# Patient Record
Sex: MALE | Race: WHITE | NOT HISPANIC OR LATINO | Employment: OTHER | URBAN - METROPOLITAN AREA
[De-identification: names, ages, dates, MRNs, and addresses within clinical notes are randomized per-mention and may not be internally consistent; named-entity substitution may affect disease eponyms.]

---

## 2021-09-08 ENCOUNTER — HOSPITAL ENCOUNTER (INPATIENT)
Facility: HOSPITAL | Age: 66
LOS: 11 days | Discharge: HOME | End: 2021-09-19
Attending: EMERGENCY MEDICINE | Admitting: INTERNAL MEDICINE

## 2021-09-08 ENCOUNTER — APPOINTMENT (OUTPATIENT)
Dept: GENERAL RADIOLOGY | Facility: HOSPITAL | Age: 66
End: 2021-09-08

## 2021-09-08 ENCOUNTER — APPOINTMENT (OUTPATIENT)
Dept: CT IMAGING | Facility: HOSPITAL | Age: 66
End: 2021-09-08

## 2021-09-08 DIAGNOSIS — U07.1 PNEUMONIA DUE TO COVID-19 VIRUS: Primary | ICD-10-CM

## 2021-09-08 DIAGNOSIS — R09.02 HYPOXIA: ICD-10-CM

## 2021-09-08 DIAGNOSIS — J12.82 PNEUMONIA DUE TO COVID-19 VIRUS: Primary | ICD-10-CM

## 2021-09-08 PROBLEM — I10 ESSENTIAL HYPERTENSION: Status: ACTIVE | Noted: 2021-09-08

## 2021-09-08 LAB
ALBUMIN SERPL-MCNC: 3.8 G/DL (ref 3.5–5.2)
ALBUMIN/GLOB SERPL: 1.1 G/DL
ALP SERPL-CCNC: 66 U/L (ref 39–117)
ALT SERPL W P-5'-P-CCNC: 25 U/L (ref 1–41)
ANION GAP SERPL CALCULATED.3IONS-SCNC: 15 MMOL/L (ref 5–15)
AST SERPL-CCNC: 30 U/L (ref 1–40)
BASOPHILS # BLD AUTO: 0.02 10*3/MM3 (ref 0–0.2)
BASOPHILS NFR BLD AUTO: 0.3 % (ref 0–1.5)
BILIRUB SERPL-MCNC: 0.6 MG/DL (ref 0–1.2)
BUN SERPL-MCNC: 19 MG/DL (ref 8–23)
BUN/CREAT SERPL: 23.5 (ref 7–25)
CALCIUM SPEC-SCNC: 8.8 MG/DL (ref 8.6–10.5)
CHLORIDE SERPL-SCNC: 97 MMOL/L (ref 98–107)
CO2 SERPL-SCNC: 23 MMOL/L (ref 22–29)
CREAT SERPL-MCNC: 0.81 MG/DL (ref 0.76–1.27)
D DIMER PPP FEU-MCNC: 0.57 MCGFEU/ML (ref 0–0.56)
D-LACTATE SERPL-SCNC: 1.8 MMOL/L (ref 0.5–2)
DEPRECATED RDW RBC AUTO: 39.5 FL (ref 37–54)
EOSINOPHIL # BLD AUTO: 0 10*3/MM3 (ref 0–0.4)
EOSINOPHIL NFR BLD AUTO: 0 % (ref 0.3–6.2)
ERYTHROCYTE [DISTWIDTH] IN BLOOD BY AUTOMATED COUNT: 11.6 % (ref 12.3–15.4)
GFR SERPL CREATININE-BSD FRML MDRD: 95 ML/MIN/1.73
GLOBULIN UR ELPH-MCNC: 3.4 GM/DL
GLUCOSE SERPL-MCNC: 227 MG/DL (ref 65–99)
HCT VFR BLD AUTO: 47.5 % (ref 37.5–51)
HGB BLD-MCNC: 16.5 G/DL (ref 13–17.7)
HOLD SPECIMEN: NORMAL
HOLD SPECIMEN: NORMAL
IMM GRANULOCYTES # BLD AUTO: 0.03 10*3/MM3 (ref 0–0.05)
IMM GRANULOCYTES NFR BLD AUTO: 0.4 % (ref 0–0.5)
LYMPHOCYTES # BLD AUTO: 1.08 10*3/MM3 (ref 0.7–3.1)
LYMPHOCYTES NFR BLD AUTO: 14.7 % (ref 19.6–45.3)
MCH RBC QN AUTO: 32.2 PG (ref 26.6–33)
MCHC RBC AUTO-ENTMCNC: 34.7 G/DL (ref 31.5–35.7)
MCV RBC AUTO: 92.8 FL (ref 79–97)
MONOCYTES # BLD AUTO: 0.49 10*3/MM3 (ref 0.1–0.9)
MONOCYTES NFR BLD AUTO: 6.7 % (ref 5–12)
NEUTROPHILS NFR BLD AUTO: 5.74 10*3/MM3 (ref 1.7–7)
NEUTROPHILS NFR BLD AUTO: 77.9 % (ref 42.7–76)
NRBC BLD AUTO-RTO: 0 /100 WBC (ref 0–0.2)
NT-PROBNP SERPL-MCNC: 245.4 PG/ML (ref 0–900)
PLATELET # BLD AUTO: 139 10*3/MM3 (ref 140–450)
PMV BLD AUTO: 10.1 FL (ref 6–12)
POTASSIUM SERPL-SCNC: 3.6 MMOL/L (ref 3.5–5.2)
PROT SERPL-MCNC: 7.2 G/DL (ref 6–8.5)
RBC # BLD AUTO: 5.12 10*6/MM3 (ref 4.14–5.8)
SODIUM SERPL-SCNC: 135 MMOL/L (ref 136–145)
TROPONIN T SERPL-MCNC: <0.01 NG/ML (ref 0–0.03)
WBC # BLD AUTO: 7.36 10*3/MM3 (ref 3.4–10.8)
WHOLE BLOOD HOLD SPECIMEN: NORMAL
WHOLE BLOOD HOLD SPECIMEN: NORMAL

## 2021-09-08 PROCEDURE — 0 IOPAMIDOL PER 1 ML: Performed by: INTERNAL MEDICINE

## 2021-09-08 PROCEDURE — 71275 CT ANGIOGRAPHY CHEST: CPT

## 2021-09-08 PROCEDURE — U0003 INFECTIOUS AGENT DETECTION BY NUCLEIC ACID (DNA OR RNA); SEVERE ACUTE RESPIRATORY SYNDROME CORONAVIRUS 2 (SARS-COV-2) (CORONAVIRUS DISEASE [COVID-19]), AMPLIFIED PROBE TECHNIQUE, MAKING USE OF HIGH THROUGHPUT TECHNOLOGIES AS DESCRIBED BY CMS-2020-01-R: HCPCS | Performed by: EMERGENCY MEDICINE

## 2021-09-08 PROCEDURE — 71045 X-RAY EXAM CHEST 1 VIEW: CPT

## 2021-09-08 PROCEDURE — 87040 BLOOD CULTURE FOR BACTERIA: CPT | Performed by: INTERNAL MEDICINE

## 2021-09-08 PROCEDURE — 25010000002 DEXAMETHASONE PER 1 MG: Performed by: EMERGENCY MEDICINE

## 2021-09-08 PROCEDURE — 83605 ASSAY OF LACTIC ACID: CPT | Performed by: INTERNAL MEDICINE

## 2021-09-08 PROCEDURE — XW033E5 INTRODUCTION OF REMDESIVIR ANTI-INFECTIVE INTO PERIPHERAL VEIN, PERCUTANEOUS APPROACH, NEW TECHNOLOGY GROUP 5: ICD-10-PCS | Performed by: INTERNAL MEDICINE

## 2021-09-08 PROCEDURE — 84145 PROCALCITONIN (PCT): CPT | Performed by: INTERNAL MEDICINE

## 2021-09-08 PROCEDURE — 80053 COMPREHEN METABOLIC PANEL: CPT

## 2021-09-08 PROCEDURE — 84484 ASSAY OF TROPONIN QUANT: CPT | Performed by: EMERGENCY MEDICINE

## 2021-09-08 PROCEDURE — 99222 1ST HOSP IP/OBS MODERATE 55: CPT | Performed by: INTERNAL MEDICINE

## 2021-09-08 PROCEDURE — 99283 EMERGENCY DEPT VISIT LOW MDM: CPT

## 2021-09-08 PROCEDURE — 83880 ASSAY OF NATRIURETIC PEPTIDE: CPT | Performed by: EMERGENCY MEDICINE

## 2021-09-08 PROCEDURE — 93005 ELECTROCARDIOGRAM TRACING: CPT | Performed by: EMERGENCY MEDICINE

## 2021-09-08 PROCEDURE — 25010000002 DEXAMETHASONE SODIUM PHOSPHATE 100 MG/10ML SOLUTION: Performed by: EMERGENCY MEDICINE

## 2021-09-08 PROCEDURE — 85379 FIBRIN DEGRADATION QUANT: CPT | Performed by: INTERNAL MEDICINE

## 2021-09-08 PROCEDURE — 85025 COMPLETE CBC W/AUTO DIFF WBC: CPT

## 2021-09-08 RX ORDER — LISINOPRIL 40 MG/1
40 TABLET ORAL DAILY
COMMUNITY

## 2021-09-08 RX ORDER — DEXAMETHASONE SODIUM PHOSPHATE 4 MG/ML
6 INJECTION, SOLUTION INTRA-ARTICULAR; INTRALESIONAL; INTRAMUSCULAR; INTRAVENOUS; SOFT TISSUE DAILY
Status: DISCONTINUED | OUTPATIENT
Start: 2021-09-09 | End: 2021-09-09

## 2021-09-08 RX ADMIN — DEXAMETHASONE SODIUM PHOSPHATE 10 MG: 10 INJECTION, SOLUTION INTRAMUSCULAR; INTRAVENOUS at 22:58

## 2021-09-08 RX ADMIN — IOPAMIDOL 85 ML: 755 INJECTION, SOLUTION INTRAVENOUS at 23:56

## 2021-09-09 PROBLEM — R09.02 HYPOXIA: Status: ACTIVE | Noted: 2021-09-09

## 2021-09-09 LAB
ALBUMIN SERPL-MCNC: 3.5 G/DL (ref 3.5–5.2)
ALP SERPL-CCNC: 61 U/L (ref 39–117)
ALT SERPL W P-5'-P-CCNC: 23 U/L (ref 1–41)
AST SERPL-CCNC: 25 U/L (ref 1–40)
BILIRUB CONJ SERPL-MCNC: 0.2 MG/DL (ref 0–0.3)
BILIRUB INDIRECT SERPL-MCNC: 0.4 MG/DL
BILIRUB SERPL-MCNC: 0.6 MG/DL (ref 0–1.2)
CREAT SERPL-MCNC: 0.75 MG/DL (ref 0.76–1.27)
CRP SERPL-MCNC: 4.34 MG/DL (ref 0–0.5)
FERRITIN SERPL-MCNC: 1738 NG/ML (ref 30–400)
GFR SERPL CREATININE-BSD FRML MDRD: 104 ML/MIN/1.73
GLUCOSE BLDC GLUCOMTR-MCNC: 252 MG/DL (ref 70–130)
GLUCOSE BLDC GLUCOMTR-MCNC: 266 MG/DL (ref 70–130)
GLUCOSE BLDC GLUCOMTR-MCNC: 289 MG/DL (ref 70–130)
GLUCOSE BLDC GLUCOMTR-MCNC: 337 MG/DL (ref 70–130)
HOLD SPECIMEN: NORMAL
LDH SERPL-CCNC: 277 U/L (ref 135–225)
PROCALCITONIN SERPL-MCNC: 0.1 NG/ML (ref 0–0.25)
PROT SERPL-MCNC: 6.9 G/DL (ref 6–8.5)
QT INTERVAL: 378 MS
QTC INTERVAL: 430 MS
SARS-COV-2 RNA RESP QL NAA+PROBE: DETECTED

## 2021-09-09 PROCEDURE — 82728 ASSAY OF FERRITIN: CPT | Performed by: NURSE PRACTITIONER

## 2021-09-09 PROCEDURE — 82962 GLUCOSE BLOOD TEST: CPT

## 2021-09-09 PROCEDURE — 94799 UNLISTED PULMONARY SVC/PX: CPT

## 2021-09-09 PROCEDURE — 63710000001 INSULIN DETEMIR PER 5 UNITS: Performed by: INTERNAL MEDICINE

## 2021-09-09 PROCEDURE — 25010000002 ENOXAPARIN PER 10 MG: Performed by: NURSE PRACTITIONER

## 2021-09-09 PROCEDURE — 80076 HEPATIC FUNCTION PANEL: CPT | Performed by: NURSE PRACTITIONER

## 2021-09-09 PROCEDURE — 82565 ASSAY OF CREATININE: CPT | Performed by: NURSE PRACTITIONER

## 2021-09-09 PROCEDURE — 86140 C-REACTIVE PROTEIN: CPT | Performed by: NURSE PRACTITIONER

## 2021-09-09 PROCEDURE — 63710000001 DEXAMETHASONE PER 0.25 MG: Performed by: INTERNAL MEDICINE

## 2021-09-09 PROCEDURE — 99233 SBSQ HOSP IP/OBS HIGH 50: CPT | Performed by: INTERNAL MEDICINE

## 2021-09-09 PROCEDURE — 63710000001 INSULIN LISPRO (HUMAN) PER 5 UNITS: Performed by: INTERNAL MEDICINE

## 2021-09-09 PROCEDURE — 83615 LACTATE (LD) (LDH) ENZYME: CPT | Performed by: NURSE PRACTITIONER

## 2021-09-09 RX ORDER — DEXTROSE MONOHYDRATE 25 G/50ML
25 INJECTION, SOLUTION INTRAVENOUS
Status: DISCONTINUED | OUTPATIENT
Start: 2021-09-09 | End: 2021-09-10 | Stop reason: SDUPTHER

## 2021-09-09 RX ORDER — NICOTINE POLACRILEX 4 MG
15 LOZENGE BUCCAL
Status: DISCONTINUED | OUTPATIENT
Start: 2021-09-09 | End: 2021-09-10 | Stop reason: SDUPTHER

## 2021-09-09 RX ORDER — DEXTROMETHORPHAN POLISTIREX 30 MG/5ML
60 SUSPENSION ORAL EVERY 12 HOURS SCHEDULED
Status: DISCONTINUED | OUTPATIENT
Start: 2021-09-09 | End: 2021-09-19 | Stop reason: HOSPADM

## 2021-09-09 RX ORDER — BENZONATATE 100 MG/1
200 CAPSULE ORAL 3 TIMES DAILY PRN
Status: DISCONTINUED | OUTPATIENT
Start: 2021-09-09 | End: 2021-09-19 | Stop reason: HOSPADM

## 2021-09-09 RX ORDER — LISINOPRIL 40 MG/1
40 TABLET ORAL DAILY
Status: DISCONTINUED | OUTPATIENT
Start: 2021-09-09 | End: 2021-09-11

## 2021-09-09 RX ORDER — ALBUTEROL SULFATE 90 UG/1
2 AEROSOL, METERED RESPIRATORY (INHALATION)
Status: DISCONTINUED | OUTPATIENT
Start: 2021-09-09 | End: 2021-09-19

## 2021-09-09 RX ORDER — ACETAMINOPHEN 325 MG/1
650 TABLET ORAL EVERY 6 HOURS PRN
Status: DISCONTINUED | OUTPATIENT
Start: 2021-09-09 | End: 2021-09-19 | Stop reason: HOSPADM

## 2021-09-09 RX ADMIN — BENZONATATE 200 MG: 100 CAPSULE ORAL at 03:32

## 2021-09-09 RX ADMIN — INSULIN LISPRO 5 UNITS: 100 INJECTION, SOLUTION INTRAVENOUS; SUBCUTANEOUS at 17:09

## 2021-09-09 RX ADMIN — ALBUTEROL SULFATE 2 PUFF: 90 AEROSOL, METERED RESPIRATORY (INHALATION) at 11:27

## 2021-09-09 RX ADMIN — INSULIN LISPRO 4 UNITS: 100 INJECTION, SOLUTION INTRAVENOUS; SUBCUTANEOUS at 09:02

## 2021-09-09 RX ADMIN — DEXAMETHASONE 6 MG: 4 TABLET ORAL at 09:02

## 2021-09-09 RX ADMIN — ALBUTEROL SULFATE 2 PUFF: 90 AEROSOL, METERED RESPIRATORY (INHALATION) at 06:55

## 2021-09-09 RX ADMIN — INSULIN DETEMIR 10 UNITS: 100 INJECTION, SOLUTION SUBCUTANEOUS at 12:12

## 2021-09-09 RX ADMIN — INSULIN LISPRO 4 UNITS: 100 INJECTION, SOLUTION INTRAVENOUS; SUBCUTANEOUS at 12:12

## 2021-09-09 RX ADMIN — ALBUTEROL SULFATE 2 PUFF: 90 AEROSOL, METERED RESPIRATORY (INHALATION) at 15:06

## 2021-09-09 RX ADMIN — ALBUTEROL SULFATE 2 PUFF: 90 AEROSOL, METERED RESPIRATORY (INHALATION) at 19:30

## 2021-09-09 RX ADMIN — REMDESIVIR 200 MG: 100 INJECTION, POWDER, LYOPHILIZED, FOR SOLUTION INTRAVENOUS at 01:57

## 2021-09-09 RX ADMIN — ENOXAPARIN SODIUM 40 MG: 40 INJECTION SUBCUTANEOUS at 20:52

## 2021-09-09 RX ADMIN — LISINOPRIL 40 MG: 40 TABLET ORAL at 09:02

## 2021-09-09 RX ADMIN — Medication 60 MG: at 20:49

## 2021-09-09 RX ADMIN — Medication 60 MG: at 09:02

## 2021-09-09 NOTE — H&P
[facility]  HISTORY AND PHYSICAL    Patient Name: Flip Yu II  : 1955  MRN: [id number]  Primary Care Physician: Nico Theodore MD  Date of admission: 2021    Subjective   Subjective     Chief Complaint:  Cough, post nasal drip, congestion     HPI:  Flip Yu II is a 66 y.o. male with a past medical history significant for essential hypertension presents to the ED with complaints of cough, post nasal drip and congestion over the past 3 weeks.  Patient denies any fever, chills, nausea, vomiting, diarrhea, abdominal pain, chest pain or palpitations.  Upon arrival to the ED her oxygen saturation was 89%.  CXR obtained showed subtle faint bilateral infiltrates suggesting COVID 19 pneumonia.  Patient reports he not vaccinated.  He denies any known ill contacts.  Currently COVID-19 PCR is pending. Patient will be admitted to [facility] under the care of the Hospitalist for further evaluation and treatment.        COVID Details:    Symptoms:    [] NONE [] Fever [x]  Cough [] Shortness of breath [] Change in taste/smell      The patient qualifies to receive the vaccine, but they have not yet received it.      Review of Systems   Constitutional: Positive for appetite change. Negative for activity change, chills, diaphoresis, fatigue, fever and unexpected weight change.   HENT: Positive for congestion and postnasal drip.    Eyes: Negative for photophobia and visual disturbance.   Respiratory: Positive for cough. Negative for shortness of breath.    Cardiovascular: Negative for chest pain, palpitations and leg swelling.   Gastrointestinal: Negative for abdominal distention, abdominal pain, blood in stool, constipation, diarrhea, nausea and vomiting.   Genitourinary: Negative.    Musculoskeletal: Negative.    Skin: Negative.    Neurological: Negative.    Psychiatric/Behavioral: Negative.         All other systems reviewed and are negative.     Personal History      Past Medical History:   Diagnosis Date   • Diabetes (CMS/HCC)    • Hypertension        History reviewed. No pertinent surgical history.    Family History: family history includes No Known Problems in his mother. Otherwise pertinent FHx was reviewed and unremarkable.     Social History:  reports that he has never smoked. He has never used smokeless tobacco. He reports that he does not drink alcohol and does not use drugs.  Social History     Social History Narrative   • Not on file       Medications:  lisinopril    No Known Allergies    Objective   Objective     Vital Signs:   Temp:  [98.7 °F (37.1 °C)-99.9 °F (37.7 °C)] 98.7 °F (37.1 °C)  Heart Rate:  [77-91] 77  Resp:  [16-18] 16  BP: (145-151)/(85-92) 151/85  Flow (L/min):  [3-4] 3    Physical Exam   With patient's consent, physical exam was conducted via visual telemedicine encounter due to patient's current isolation requirements in the interest of PPE conservation.    Constitutional: No acute distress, awake, alert, nontoxic, normal body habitus  HENT: NCAT, MMM, no conjunctival injection  Respiratory: Good effort, nonlabored respirations   Cardiovascular:  tele with NSR  Musculoskeletal: No edema, normal muscle tone and mass for age  Psychiatric: Appropriate affect, good insight and judgement, cooperative  Neurologic: Oriented x 3, movements symmetric BUE and BLE, speech clear and fluent  Skin: No visible rashes, no jaundice seen on exposed skin through window        Results Reviewed:  I have personally reviewed most recent indicated data and agree with findings including:  [x]  Laboratory  [x]  Radiology  [x]  EKG/Telemetry  []  Pathology  []  Cardiac/Vascular Studies  []  Old records  []  Other:  Most pertinent findings include:      LAB RESULTS:      Lab 09/08/21 2005   WBC 7.36   HEMOGLOBIN 16.5   HEMATOCRIT 47.5   PLATELETS 139*   NEUTROS ABS 5.74   IMMATURE GRANS (ABS) 0.03   LYMPHS ABS 1.08   MONOS ABS 0.49   EOS ABS 0.00   MCV 92.8   PROCALCITONIN  0.10   LACTATE 1.8   D DIMER QUANT 0.57*         Lab 09/08/21 2005   SODIUM 135*   POTASSIUM 3.6   CHLORIDE 97*   CO2 23.0   ANION GAP 15.0   BUN 19   CREATININE 0.81   GLUCOSE 227*   CALCIUM 8.8         Lab 09/08/21 2005   TOTAL PROTEIN 7.2   ALBUMIN 3.80   GLOBULIN 3.4   ALT (SGPT) 25   AST (SGOT) 30   BILIRUBIN 0.6   ALK PHOS 66         Lab 09/08/21 2005   PROBNP 245.4   TROPONIN T <0.010                 Brief Urine Lab Results     None        Microbiology Results (last 10 days)     Procedure Component Value - Date/Time    COVID PRE-OP / PRE-PROCEDURE SCREENING ORDER (NO ISOLATION) - Swab, Nasopharynx [[id number]]  (Abnormal) Collected: 09/08/21 2305    Lab Status: Final result Specimen: Swab from Nasopharynx Updated: 09/09/21 0016    Narrative:      The following orders were created for panel order COVID PRE-OP / PRE-PROCEDURE SCREENING ORDER (NO ISOLATION) - Swab, Nasopharynx.  Procedure                               Abnormality         Status                     ---------                               -----------         ------                     COVID-19,CEPHEID/ADRIENNE,LE...[[id number]]  Abnormal            Final result                 Please view results for these tests on the individual orders.    COVID-19,CEPHEID/ADRIENNE,[facility] IN-HOUSE(OR EMERGENT/ADD-ON),NP SWAB IN TRANSPORT MEDIA 3-4 HR TAT - Swab, Nasopharynx [[id number]]  (Abnormal) Collected: 09/08/21 2305    Lab Status: Final result Specimen: Swab from Nasopharynx Updated: 09/09/21 0016     COVID19 Detected    Narrative:      Fact sheet for providers: https://www.fda.gov/media/889110/download     Fact sheet for patients: https://www.fda.gov/media/137487/download  Fact sheet for providers: https://www.fda.gov/media/448767/download     Fact sheet for patients: https://www.fda.gov/media/174604/download          XR Chest 1 View    Result Date: 9/8/2021  CR Chest 1 Vw INDICATION: Congestion tonight COMPARISON:  None available. FINDINGS: Portable AP view(s) of  the chest.  Size and vascular normal. There are faint patchy infiltrates bilaterally greater on the right side than the left. The bones are normal     Impression: Subtle faint bilateral infiltrates suggesting Covid 19 pneumonia. Clinical correlation is recommended Signer Name: Ze Centeno MD  Signed: 9/8/2021 9:12 PM  Workstation Name: [facility]  [facility]    CT Angiogram Chest    Result Date: 9/9/2021  CTA Chest INDICATION: Elevated d-dimer. Covid pneumonia. TECHNIQUE: CT angiogram of the chest with IV contrast. 3-D reconstructions were obtained and reviewed.   Radiation dose reduction techniques included automated exposure control or exposure modulation based on body size. Count of known CT and cardiac nuc med studies performed in previous 12 months: 0. COMPARISON: None available. FINDINGS: Multifocal parenchymal opacities throughout both lungs most prominent within the dependent and lower lung zones. Findings compatible with multifocal pneumonitis and atelectasis. No effusions. Trachea and bronchi unremarkable. No evidence of pulmonary embolus. Heart size within normal limits. Aorta free of dissection or aneurysm. Solitary gallstone without evidence of acute cholecystitis.     Impression: Multifocal parenchymal opacities most compatible with multifocal pneumonia with some superimposed atelectasis. Commonly reported imaging features of COVID-19 pneumonia are present. Other processes such as influenza pneumonia and organizing pneumonia can cause a similar imaging pattern. No evidence of pulmonary embolus. Signer Name: OMAR Alcantara MD  Signed: 9/9/2021 12:05 AM  Workstation Name: [facility]  [facility]          Assessment/Plan   Assessment & Plan       Pneumonia due to COVID-19 virus    Essential hypertension    Hypoxia    Diabetes mellitus, type II (CMS/HCC)    Thrombocytopenia (CMS/HCC)      66 year old male presents to the ED with three week history of  cough, post nasal drainage, congestion and decreased appetite.      Hypoxia due to Covid 19 Pneumonia  --Not on home oxygen, satting 89% at arrival.  Satting 90% on 3 L  --Start dexamethasone 6mg q24h  --Start remdesevir  --tylenol for fever  --d-dimer, ferritin, ldh, crp  --serial covid 19 labs  --dvt ppx  --Blood culture, sputum culture, urine antigens  --If procal is positive, will start empiric antibiotics  --Albuterol inhaler  --Cough suppressant, Zofran    2) Essential hypertension   -Order to obtain home med list    3) diabetes mellitus type 2  -SSI with Accu-Cheks  -Not on home insulin-A1c for a.m.    4) thrombocytopenia  -Likely due to Covid  -A.m. labs    Home med rec not complete, order placed to obtain home med list.      DVT prophylaxis: scds, lovenox    CODE STATUS:  Full code   Level Of Support Discussed With: Patient  Code Status: CPR  Medical Interventions (Level of Support Prior to Arrest): Full      This note has been completed as part of a split-shared workflow.     Signature: Electronically signed by WILFREDO Perez, 09/08/21, 11:48 PM EDT.      Attending   Admission Attestation       I have seen and examined the patient, performing an independent face-to-face diagnostic evaluation with plan of care reviewed and developed with the advanced practice clinician (APC).      Brief Summary Statement:   Flip Yu II is a 66 y.o. male With a PMH significant for HTN, diabetes mellitus who presents to the ED due to persistent cough.  Patient reports a cough productive of yellow sputum and nasal congestion ongoing for the past 3 weeks.  He came to the ED due to failure to improve.  He denies fever, malaise, nausea, vomiting, diarrhea, shortness of breath, headache.  He reports new onset change in taste, denies known exposure to COVID-19.  Patient is not vaccinated.  Remainder of detailed HPI is as noted by APC and has been reviewed and/or edited by me for completeness.    Attending Physical  Exam:  Constitutional: Awake, alert  Eyes: PERRLA, sclerae anicteric, no conjunctival injection  HENT: NCAT, mucous membranes moist  Neck: Supple, no thyromegaly, no lymphadenopathy, trachea midline  Respiratory: Clear to auscultation bilaterally, nonlabored respirations   Cardiovascular: RRR, no murmurs, rubs, or gallops, palpable pedal pulses bilaterally  Gastrointestinal: Positive bowel sounds, soft, nontender, nondistended  Musculoskeletal: No bilateral ankle edema, no clubbing or cyanosis to extremities  Psychiatric: Appropriate affect, cooperative  Neurologic: Oriented x 3, strength symmetric in all extremities, Cranial Nerves grossly intact to confrontation, speech clear  Skin: No rashes      Brief Assessment/Plan :  See detailed assessment and plan developed with APC which I have reviewed and/or edited for completeness.        Admission Status: I believe that this patient meets INPATIENT status due to COVID-19 pneumonia and hypoxia.  I feel patient’s risk for adverse outcomes and need for care warrant INPATIENT evaluation and I predict the patient’s care encounter to likely last beyond 2 midnights.      Sindy Ceja,   09/09/21

## 2021-09-09 NOTE — PROGRESS NOTES
[facility]  PROGRESS NOTE    Patient Name: Flip Yu II  : 1955  MRN: [id number]    Date of Admission: 2021  Primary Care Physician: Nico Theodore MD    Subjective   Subjective     CC:  COVID-19 pneumonia    HPI:  Patient denies any shortness of breath.  He does have a cough.  Denies any diarrhea.  Currently on 6 L nasal cannula.    ROS:  General: denies fevers or chills  CV: denies chest pain  Resp: As per HPI  Abd: denies abd pain, nausea      Objective   Objective     Vital Signs:   Temp:  [98.1 °F (36.7 °C)-99.9 °F (37.7 °C)] 98.1 °F (36.7 °C)  Heart Rate:  [71-91] 71  Resp:  [16-21] 18  BP: (138-151)/(84-92) 138/84  Flow (L/min):  [3-6] 6     Physical Exam:  With patient's consent, physical exam was conducted via visual telemedicine encounter due to patient's current isolation requirements in the interest of PPE conservation.    Constitutional: No acute distress, awake, alert, nontoxic, normal body habitus  Respiratory: Good effort, nonlabored respirations on 6 L nasal cannula  Cardiovascular:  tele with NSR  Musculoskeletal: No edema, normal muscle tone and mass for age  Psychiatric: Appropriate affect, good insight and judgement, cooperative  Neurologic: Oriented x 3, movements symmetric BUE and BLE, speech clear and fluent  Skin: No visible rashes, no jaundice        Results Reviewed:  LAB RESULTS:      Lab 21   WBC  --  7.36   HEMOGLOBIN  --  16.5   HEMATOCRIT  --  47.5   PLATELETS  --  139*   NEUTROS ABS  --  5.74   IMMATURE GRANS (ABS)  --  0.03   LYMPHS ABS  --  1.08   MONOS ABS  --  0.49   EOS ABS  --  0.00   MCV  --  92.8   CRP 4.34*  --    PROCALCITONIN  --  0.10   LACTATE  --  1.8   *  --    D DIMER QUANT  --  0.57*         Lab 21   SODIUM  --  135*   POTASSIUM  --  3.6   CHLORIDE  --  97*   CO2  --  23.0   ANION GAP  --  15.0   BUN  --  19   CREATININE 0.75* 0.81    GLUCOSE  --  227*   CALCIUM  --  8.8         Lab 09/09/21  0138 09/08/21 2005   TOTAL PROTEIN 6.9 7.2   ALBUMIN 3.50 3.80   GLOBULIN  --  3.4   ALT (SGPT) 23 25   AST (SGOT) 25 30   BILIRUBIN 0.6 0.6   INDIRECT BILIRUBIN 0.4  --    BILIRUBIN DIRECT 0.2  --    ALK PHOS 61 66         Lab 09/08/21 2005   PROBNP 245.4   TROPONIN T <0.010             Lab 09/09/21 0138   FERRITIN 1,738.00*         Brief Urine Lab Results     None          Microbiology Results Abnormal     None          XR Chest 1 View    Result Date: 9/8/2021  CR Chest 1 Vw INDICATION: Congestion tonight COMPARISON:  None available. FINDINGS: Portable AP view(s) of the chest.  Size and vascular normal. There are faint patchy infiltrates bilaterally greater on the right side than the left. The bones are normal     Impression: Subtle faint bilateral infiltrates suggesting Covid 19 pneumonia. Clinical correlation is recommended Signer Name: Ze Centeno MD  Signed: 9/8/2021 9:12 PM  Workstation [facility]: [facility]  [facility]    CT Angiogram Chest    Result Date: 9/9/2021  CTA Chest INDICATION: Elevated d-dimer. Covid pneumonia. TECHNIQUE: CT angiogram of the chest with IV contrast. 3-D reconstructions were obtained and reviewed.   Radiation dose reduction techniques included automated exposure control or exposure modulation based on body size. Count of known CT and cardiac nuc med studies performed in previous 12 months: 0. COMPARISON: None available. FINDINGS: Multifocal parenchymal opacities throughout both lungs most prominent within the dependent and lower lung zones. Findings compatible with multifocal pneumonitis and atelectasis. No effusions. Trachea and bronchi unremarkable. No evidence of pulmonary embolus. Heart size within normal limits. Aorta free of dissection or aneurysm. Solitary gallstone without evidence of acute cholecystitis.     Impression: Multifocal parenchymal opacities most compatible with multifocal  pneumonia with some superimposed atelectasis. Commonly reported imaging features of COVID-19 pneumonia are present. Other processes such as influenza pneumonia and organizing pneumonia can cause a similar imaging pattern. No evidence of pulmonary embolus. Signer Name: OMAR Alcantara MD  Signed: 9/9/2021 12:05 AM  Workstation [facility]: [facility]  [facility] [place]          I have reviewed the medications:  Scheduled Meds:albuterol sulfate HFA, 2 puff, Inhalation, 4x Daily - RT  dexamethasone, 6 mg, Oral, Daily With Breakfast  dextromethorphan polistirex ER, 60 mg, Oral, Q12H  enoxaparin, 40 mg, Subcutaneous, Q24H  insulin detemir, 10 Units, Subcutaneous, Daily  insulin lispro, 0-7 Units, Subcutaneous, TID AC  lisinopril, 40 mg, Oral, Daily  [START ON 9/10/2021] remdesivir, 100 mg, Intravenous, Q24H      Continuous Infusions:Pharmacy Consult - Remdesivir,       PRN Meds:.•  acetaminophen  •  benzonatate  •  dextrose  •  dextrose  •  glucagon (human recombinant)  •  Pharmacy Consult - Remdesivir    Assessment/Plan   Assessment & Plan     Active Hospital Problems    Diagnosis  POA   • **Pneumonia due to COVID-19 virus [U07.1, J12.82]  Yes   • Hypoxia [R09.02]  Unknown   • Diabetes mellitus, type II (CMS/HCC) [E11.9]  Unknown   • Thrombocytopenia (CMS/HCC) [D69.6]  Unknown   • Essential hypertension [I10]  Yes      Resolved Hospital Problems   No resolved problems to display.        Brief Hospital Course to date:  66 year old male presents to the ED with three week history of cough, post nasal drainage, congestion and decreased appetite.       Acute hypoxic respiratory failure due to Covid 19 Pneumonia  --Requiring 3 L on presentation, worsened overnight to 6 L, no home oxygen requirements  - CRP elevated at 4.34.  D-dimer mildly elevated 0.57.  Procalcitonin normal  -CTA chest shows multifocal pneumonia consistent with COVID-19  --Continue dexamethasone 6mg daily.  Plan for 10-day course.  Today is  day 2  --Continue IV remdesivir for 5 days.  Today is day 2  --Albuterol inhaler  -Wean oxygen as tolerated  -Monitor CRP, CMP.  Ordered for tomorrow     Essential hypertension   -Continue lisinopril 40 mg daily.     diabetes mellitus type 2  -SSI with Accu-Cheks  -Started on Levemir 10 units daily.  -Glucoses will likely be elevated secondary to steroids     thrombocytopenia  -Likely due to Covid  -Monitor with daily CBC       DVT prophylaxis:  Medical DVT prophylaxis orders are present.            Disposition: I expect the patient to be discharged TBD.    CODE STATUS:   Code Status and Medical Interventions:   Ordered at: 09/08/21 5362     Level Of Support Discussed With:    Patient     Code Status:    CPR     Medical Interventions (Level of Support Prior to Arrest):    Full     This patient's problems and plans were partially entered by my partner and updated as appropriate by me 09/09/21. Today is my first day evaluating this patient's active medical problems. I Personally reviewed chart and adjusted note to reflect daily changes in management/clinical condition      Celestina Drummond MD  09/09/21

## 2021-09-09 NOTE — ED PROVIDER NOTES
[place]    EMERGENCY DEPARTMENT ENCOUNTER      Pt Name: Flip Yu II  MRN: [id number]  YOB: 1955  Date of evaluation: 9/8/2021  Provider: Richi Alcantara MD    CHIEF COMPLAINT       Chief Complaint   Patient presents with   • Nasal Congestion         HISTORY OF PRESENT ILLNESS  (Location/Symptom, Timing/Onset, Context/Setting, Quality, Duration, Modifying Factors, Severity.)   Flip Yu II is a 66 y.o. male who presents to the emergency department with moderate nasal congestion and shortness of breath is worse with exertion over the course the past 3 days without any associated chest pain, fever, chills, yumiko pain, vomiting, or diarrhea.  Patient is unvaccinated for COVID-19.  He denies any prior cardiac history. Patient denies any history of VTE as well as any recent surgery, hospitalization, long distance travel, swelling or pain in the lower extremities, or exogenous hormone use.  He has a history of diabetes mellitus.  Denies any recent sick contacts.        Nursing notes were reviewed.    REVIEW OF SYSTEMS    (2-9 systems for level 4, 10 or more for level 5)   ROS:  General:  No fevers, no chills, no weakness  Cardiovascular:  No chest pain, no palpitations  Respiratory: Cough, shortness of breath  Gastrointestinal:  No pain, no nausea, no vomiting, no diarrhea  Musculoskeletal:  No muscle pain, no joint pain  Skin:  No rash  Neurologic:  No speech problems, no headache, no extremity numbness, no extremity tingling, no extremity weakness  Psychiatric:  No anxiety  Genitourinary:  No dysuria, no hematuria    Except as noted above the remainder of the review of systems was reviewed and negative.       PAST MEDICAL HISTORY     Past Medical History:   Diagnosis Date   • Diabetes (CMS/HCC)    • Hypertension          SURGICAL HISTORY     History reviewed. No pertinent surgical history.      CURRENT MEDICATIONS       Current Facility-Administered Medications:   •  acetaminophen (TYLENOL)  tablet 650 mg, 650 mg, Oral, Q6H PRN, Marcial, Sindy G, DO  •  albuterol sulfate HFA (PROVENTIL HFA;VENTOLIN HFA;PROAIR HFA) inhaler 2 puff, 2 puff, Inhalation, 4x Daily - RT, Marcial, Sindy G, DO  •  benzonatate (TESSALON) capsule 200 mg, 200 mg, Oral, TID PRN, Marcial, Sindy G, DO, 200 mg at 09/09/21 0332  •  dexamethasone (DECADRON) injection 6 mg, 6 mg, Intravenous, Daily, Emily, Judy, APRN  •  dextromethorphan polistirex ER (DELSYM) 30 MG/5ML oral suspension 60 mg, 60 mg, Oral, Q12H, Marcial, Sindy G, DO  •  dextrose (D50W) 25 g/ 50mL Intravenous Solution 25 g, 25 g, Intravenous, Q15 Min PRN, Marcial, Sindy G, DO  •  dextrose (GLUTOSE) oral gel 15 g, 15 g, Oral, Q15 Min PRN, Marcial, Sindy G, DO  •  enoxaparin (LOVENOX) syringe 40 mg, 40 mg, Subcutaneous, Q24H, Emily, Judy, APRN  •  glucagon (human recombinant) (GLUCAGEN DIAGNOSTIC) injection 1 mg, 1 mg, Subcutaneous, Q15 Min PRN, Marcial, Sindy G, DO  •  insulin lispro (humaLOG) injection 0-7 Units, 0-7 Units, Subcutaneous, TID AC, Marcial, Sindy G, DO  •  lisinopril (PRINIVIL,ZESTRIL) tablet 40 mg, 40 mg, Oral, Daily, Marcial, Sindy G, DO  •  Pharmacy Consult - Remdesivir, , Does not apply, Continuous PRN, Emily, Judy, APRN  •  [COMPLETED] remdesivir 200 mg in sodium chloride 0.9 % 290 mL IVPB (powder vial), 200 mg, Intravenous, Q24H, 200 mg at 09/09/21 0157 **FOLLOWED BY** [START ON 9/10/2021] remdesivir 100 mg in sodium chloride 0.9 % 270 mL IVPB (powder vial), 100 mg, Intravenous, Q24H, Emily, Judy, APRN    ALLERGIES     Patient has no known allergies.    FAMILY HISTORY       Family History   Problem Relation Age of Onset   • No Known Problems Mother           SOCIAL HISTORY       Social History     Socioeconomic History   • Marital status: Single     Spouse name: Not on file   • Number of children: Not on file   • Years of education: Not on file   • Highest education level: Not on file   Tobacco Use   • Smoking status: Never Smoker   •  "Smokeless tobacco: Never Used   Vaping Use   • Vaping Use: Never used   Substance and Sexual Activity   • Alcohol use: Never   • Drug use: Never   • Sexual activity: Defer         PHYSICAL EXAM    (up to 7 for level 4, 8 or more for level 5)     Vitals:    09/08/21 1955 09/09/21 0017 09/09/21 0500   BP: 145/92 151/85 139/85   BP Location: Right arm Right arm Right arm   Patient Position: Sitting Lying Lying   Pulse: 91 77    Resp: 18 16 20   Temp: 99.9 °F (37.7 °C) 98.7 °F (37.1 °C) 98.1 °F (36.7 °C)   TempSrc: Oral Oral Oral   SpO2: 90%     Weight: 104 kg (230 lb) 103 kg (227 lb 14.4 oz)    Height: 188 cm (74\")         Physical Exam  General: Awake, alert, no acute distress.  HEENT: Conjunctivae normal.  Neck: Trachea midline.  Cardiac: Heart regular rate, rhythm, no murmurs, rubs, or gallops  Lungs: Lungs are clear to auscultation, there is no wheezing, rhonchi, or rales. There is no use of accessory muscles.  Chest wall: There is no tenderness to palpation over the chest wall or over ribs  Abdomen: Abdomen is soft, nontender, nondistended. There are no firm or pulsatile masses, no rebound rigidity or guarding.   Musculoskeletal: No deformity.  Neuro: Alert and oriented x 4.  Dermatology: Skin is warm and dry  Psych: Mentation is grossly normal, cognition is grossly normal. Affect is appropriate.        DIAGNOSTIC RESULTS     EKG: All EKG's are interpreted by the Emergency Department Physician who either signs or Co-signs this chart in the absence of a cardiologist.    ECG 12 Lead   Final Result   Test Reason : chest pain   Blood Pressure :   */*   mmHG   Vent. Rate :  78 BPM     Atrial Rate :  78 BPM      P-R Int : 174 ms          QRS Dur : 104 ms       QT Int : 378 ms       P-R-T Axes :  -3 -41  21 degrees      QTc Int : 430 ms      Normal sinus rhythm   Left axis deviation   Moderate voltage criteria for LVH, may be normal variant   Abnormal ECG   No previous ECGs available   Confirmed by HAKEEM VAZQUEZ ([id number]) " on 9/9/2021 5:50:58 AM      Referred By: GEORGE           Confirmed By: HAKEEM ALCANTARA          RADIOLOGY:   Non-plain film images such as CT, Ultrasound and MRI are read by the radiologist. Plain radiographic images are visualized and preliminarily interpreted by the emergency physician with the below findings:      [x] Radiologist's Report Reviewed:  CT Angiogram Chest   Final Result   Multifocal parenchymal opacities most compatible with multifocal pneumonia with some superimposed atelectasis. Commonly reported imaging features of COVID-19 pneumonia are present. Other processes such as influenza pneumonia and organizing pneumonia can   cause a similar imaging pattern.      No evidence of pulmonary embolus.      Signer Name: OMAR Alcantara MD    Signed: 9/9/2021 12:05 AM    Workstation Name: [facility]     [facility]      XR Chest 1 View   Final Result   Subtle faint bilateral infiltrates suggesting Covid 19 pneumonia. Clinical correlation is recommended      Signer Name: Ze Centeno MD    Signed: 9/8/2021 9:12 PM    Workstation Name: [facility]     [facility]            ED BEDSIDE ULTRASOUND:   Performed by ED Physician - none    LABS:    I have reviewed and interpreted all of the currently available lab results from this visit (if applicable):  Results for orders placed or performed during the hospital encounter of 09/08/21   COVID-19,CEPHEID/ADRIENNE,[facility] IN-HOUSE(OR EMERGENT/ADD-ON),NP SWAB IN TRANSPORT MEDIA 3-4 HR TAT - Swab, Nasopharynx    Specimen: Nasopharynx; Swab   Result Value Ref Range    COVID19 Detected (C) Not Detected - Ref. Range   Comprehensive Metabolic Panel    Specimen: Blood   Result Value Ref Range    Glucose 227 (H) 65 - 99 mg/dL    BUN 19 8 - 23 mg/dL    Creatinine 0.81 0.76 - 1.27 mg/dL    Sodium 135 (L) 136 - 145 mmol/L    Potassium 3.6 3.5 - 5.2 mmol/L    Chloride 97 (L) 98 - 107 mmol/L    CO2 23.0 22.0 - 29.0 mmol/L    Calcium 8.8 8.6 -  10.5 mg/dL    Total Protein 7.2 6.0 - 8.5 g/dL    Albumin 3.80 3.50 - 5.20 g/dL    ALT (SGPT) 25 1 - 41 U/L    AST (SGOT) 30 1 - 40 U/L    Alkaline Phosphatase 66 39 - 117 U/L    Total Bilirubin 0.6 0.0 - 1.2 mg/dL    eGFR Non African Amer 95 >60 mL/min/1.73    Globulin 3.4 gm/dL    A/G Ratio 1.1 g/dL    BUN/Creatinine Ratio 23.5 7.0 - 25.0    Anion Gap 15.0 5.0 - 15.0 mmol/L   CBC Auto Differential    Specimen: Blood   Result Value Ref Range    WBC 7.36 3.40 - 10.80 10*3/mm3    RBC 5.12 4.14 - 5.80 10*6/mm3    Hemoglobin 16.5 13.0 - 17.7 g/dL    Hematocrit 47.5 37.5 - 51.0 %    MCV 92.8 79.0 - 97.0 fL    MCH 32.2 26.6 - 33.0 pg    MCHC 34.7 31.5 - 35.7 g/dL    RDW 11.6 (L) 12.3 - 15.4 %    RDW-SD 39.5 37.0 - 54.0 fl    MPV 10.1 6.0 - 12.0 fL    Platelets 139 (L) 140 - 450 10*3/mm3    Neutrophil % 77.9 (H) 42.7 - 76.0 %    Lymphocyte % 14.7 (L) 19.6 - 45.3 %    Monocyte % 6.7 5.0 - 12.0 %    Eosinophil % 0.0 (L) 0.3 - 6.2 %    Basophil % 0.3 0.0 - 1.5 %    Immature Grans % 0.4 0.0 - 0.5 %    Neutrophils, Absolute 5.74 1.70 - 7.00 10*3/mm3    Lymphocytes, Absolute 1.08 0.70 - 3.10 10*3/mm3    Monocytes, Absolute 0.49 0.10 - 0.90 10*3/mm3    Eosinophils, Absolute 0.00 0.00 - 0.40 10*3/mm3    Basophils, Absolute 0.02 0.00 - 0.20 10*3/mm3    Immature Grans, Absolute 0.03 0.00 - 0.05 10*3/mm3    nRBC 0.0 0.0 - 0.2 /100 WBC   BNP    Specimen: Blood   Result Value Ref Range    proBNP 245.4 0.0 - 900.0 pg/mL   Troponin    Specimen: Blood   Result Value Ref Range    Troponin T <0.010 0.000 - 0.030 ng/mL   Procalcitonin    Specimen: Blood   Result Value Ref Range    Procalcitonin 0.10 0.00 - 0.25 ng/mL   D-dimer, Quantitative    Specimen: Blood   Result Value Ref Range    D-Dimer, Quantitative 0.57 (H) 0.00 - 0.56 MCGFEU/mL   Lactic Acid, Plasma    Specimen: Blood   Result Value Ref Range    Lactate 1.8 0.5 - 2.0 mmol/L   Lactate Dehydrogenase    Specimen: Blood   Result Value Ref Range     (H) 135 - 225 U/L   Ferritin  "   Specimen: Blood   Result Value Ref Range    Ferritin 1,738.00 (H) 30.00 - 400.00 ng/mL   C-reactive Protein    Specimen: Blood   Result Value Ref Range    C-Reactive Protein 4.34 (H) 0.00 - 0.50 mg/dL   Hepatic Function Panel    Specimen: Blood   Result Value Ref Range    Total Protein 6.9 6.0 - 8.5 g/dL    Albumin 3.50 3.50 - 5.20 g/dL    ALT (SGPT) 23 1 - 41 U/L    AST (SGOT) 25 1 - 40 U/L    Alkaline Phosphatase 61 39 - 117 U/L    Total Bilirubin 0.6 0.0 - 1.2 mg/dL    Bilirubin, Direct 0.2 0.0 - 0.3 mg/dL    Bilirubin, Indirect 0.4 mg/dL   Creatinine, Serum    Specimen: Blood   Result Value Ref Range    Creatinine 0.75 (L) 0.76 - 1.27 mg/dL    eGFR Non African Amer 104 >60 mL/min/1.73   ECG 12 Lead   Result Value Ref Range    QT Interval 378 ms    QTC Interval 430 ms   Green Top (Gel)   Result Value Ref Range    Extra Tube Hold for add-ons.    Lavender Top   Result Value Ref Range    Extra Tube hold for add-on    Gold Top - SST   Result Value Ref Range    Extra Tube Hold for add-ons.    Gray Top   Result Value Ref Range    Extra Tube Hold for add-ons.    Light Blue Top   Result Value Ref Range    Extra Tube hold for add-on         All other labs were within normal range or not returned as of this dictation.      EMERGENCY DEPARTMENT COURSE and DIFFERENTIAL DIAGNOSIS/MDM:   Vitals:    Vitals:    09/08/21 1955 09/09/21 0017 09/09/21 0500   BP: 145/92 151/85 139/85   BP Location: Right arm Right arm Right arm   Patient Position: Sitting Lying Lying   Pulse: 91 77    Resp: 18 16 20   Temp: 99.9 °F (37.7 °C) 98.7 °F (37.1 °C) 98.1 °F (36.7 °C)   TempSrc: Oral Oral Oral   SpO2: 90%     Weight: 104 kg (230 lb) 103 kg (227 lb 14.4 oz)    Height: 188 cm (74\")         ED Course as of Sep 09 0551   Wed Sep 08, 2021   2230 Patient presents with acute hypoxia likely secondary to Covid pneumonia.  Chest x-ray demonstrates no evidence of focal infiltrate, pneumothorax, or other acute intrathoracic process.  ECG and troponin " testing demonstrate no evidence of myocardial injury, CHF, electrolyte derangement, dysrhythmia, or anemia.  Decadron ordered for the patient in the ED and will be admitted to the hospitalist service.    [NS]   2232 I spoke with Dr. Woodard who accept the patient for admission.    [NS]      ED Course User Index  [NS] Richi Alcantara MD         MEDICATIONS ADMINISTERED IN ED:  Medications   enoxaparin (LOVENOX) syringe 40 mg (has no administration in time range)   dexamethasone (DECADRON) injection 6 mg (has no administration in time range)   remdesivir 200 mg in sodium chloride 0.9 % 290 mL IVPB (powder vial) (200 mg Intravenous New Bag 9/9/21 0157)     Followed by   remdesivir 100 mg in sodium chloride 0.9 % 270 mL IVPB (powder vial) (has no administration in time range)   Pharmacy Consult - Remdesivir (has no administration in time range)   dextrose (GLUTOSE) oral gel 15 g (has no administration in time range)   dextrose (D50W) 25 g/ 50mL Intravenous Solution 25 g (has no administration in time range)   glucagon (human recombinant) (GLUCAGEN DIAGNOSTIC) injection 1 mg (has no administration in time range)   insulin lispro (humaLOG) injection 0-7 Units (has no administration in time range)   lisinopril (PRINIVIL,ZESTRIL) tablet 40 mg (has no administration in time range)   acetaminophen (TYLENOL) tablet 650 mg (has no administration in time range)   benzonatate (TESSALON) capsule 200 mg (200 mg Oral Given 9/9/21 3282)   dextromethorphan polistirex ER (DELSYM) 30 MG/5ML oral suspension 60 mg (has no administration in time range)   albuterol sulfate HFA (PROVENTIL HFA;VENTOLIN HFA;PROAIR HFA) inhaler 2 puff (has no administration in time range)   dexamethasone (DECADRON) IVPB 10 mg (10 mg Intravenous New Bag 9/8/21 9369)   iopamidol (ISOVUE-370) 76 % injection 100 mL (85 mL Intravenous Given 9/8/21 1001)           CRITICAL CARE TIME    Approximately 35 minutes of discontinuous critical care time was provided to  this patient by myself absent of any time spent performing procedures.  Patient presents critically ill with with acute hypoxia secondary to COVID-19 pneumonia placing cardiovascular and respiratory systems at risk requiring the following interventions: Ministration of supplemental oxygen, administration of IV Decadron, interpretation of lab/ECG/imaging, frequent reassessment, coordination of admission with the following response: Resolution of hypoxia.  Patient at high risk of deterioration and possibly death without these interventions.        FINAL IMPRESSION      1. Pneumonia due to COVID-19 virus    2. Hypoxia          DISPOSITION/PLAN     ED Disposition     ED Disposition Condition Comment    Decision to Admit  Level of Care: Telemetry [5]   Diagnosis: Pneumonia due to COVID-19 virus [[id number]]   Admitting Physician: JAIME GALLO [[id number]]   Attending Physician: JAIME GALLO [[id number]]   Bed Request Comments: DORETHA Alcantara MD  Attending Emergency Physician               Richi Alcantara MD  09/09/21 5111

## 2021-09-10 LAB
ALBUMIN SERPL-MCNC: 3.3 G/DL (ref 3.5–5.2)
ALBUMIN/GLOB SERPL: 1 G/DL
ALP SERPL-CCNC: 62 U/L (ref 39–117)
ALT SERPL W P-5'-P-CCNC: 19 U/L (ref 1–41)
ANION GAP SERPL CALCULATED.3IONS-SCNC: 11 MMOL/L (ref 5–15)
AST SERPL-CCNC: 18 U/L (ref 1–40)
BASOPHILS # BLD AUTO: 0.01 10*3/MM3 (ref 0–0.2)
BASOPHILS NFR BLD AUTO: 0.2 % (ref 0–1.5)
BILIRUB SERPL-MCNC: 0.4 MG/DL (ref 0–1.2)
BUN SERPL-MCNC: 24 MG/DL (ref 8–23)
BUN/CREAT SERPL: 36.9 (ref 7–25)
CALCIUM SPEC-SCNC: 9 MG/DL (ref 8.6–10.5)
CHLORIDE SERPL-SCNC: 101 MMOL/L (ref 98–107)
CO2 SERPL-SCNC: 26 MMOL/L (ref 22–29)
CREAT SERPL-MCNC: 0.65 MG/DL (ref 0.76–1.27)
CRP SERPL-MCNC: 3.03 MG/DL (ref 0–0.5)
DEPRECATED RDW RBC AUTO: 39.7 FL (ref 37–54)
EOSINOPHIL # BLD AUTO: 0 10*3/MM3 (ref 0–0.4)
EOSINOPHIL NFR BLD AUTO: 0 % (ref 0.3–6.2)
ERYTHROCYTE [DISTWIDTH] IN BLOOD BY AUTOMATED COUNT: 11.5 % (ref 12.3–15.4)
GFR SERPL CREATININE-BSD FRML MDRD: 122 ML/MIN/1.73
GLOBULIN UR ELPH-MCNC: 3.2 GM/DL
GLUCOSE BLDC GLUCOMTR-MCNC: 221 MG/DL (ref 70–130)
GLUCOSE BLDC GLUCOMTR-MCNC: 237 MG/DL (ref 70–130)
GLUCOSE BLDC GLUCOMTR-MCNC: 290 MG/DL (ref 70–130)
GLUCOSE BLDC GLUCOMTR-MCNC: 323 MG/DL (ref 70–130)
GLUCOSE SERPL-MCNC: 249 MG/DL (ref 65–99)
HCT VFR BLD AUTO: 44.2 % (ref 37.5–51)
HGB BLD-MCNC: 15 G/DL (ref 13–17.7)
IMM GRANULOCYTES # BLD AUTO: 0.04 10*3/MM3 (ref 0–0.05)
IMM GRANULOCYTES NFR BLD AUTO: 0.6 % (ref 0–0.5)
LYMPHOCYTES # BLD AUTO: 1 10*3/MM3 (ref 0.7–3.1)
LYMPHOCYTES NFR BLD AUTO: 15.9 % (ref 19.6–45.3)
MCH RBC QN AUTO: 31.9 PG (ref 26.6–33)
MCHC RBC AUTO-ENTMCNC: 33.9 G/DL (ref 31.5–35.7)
MCV RBC AUTO: 94 FL (ref 79–97)
MONOCYTES # BLD AUTO: 0.61 10*3/MM3 (ref 0.1–0.9)
MONOCYTES NFR BLD AUTO: 9.7 % (ref 5–12)
NEUTROPHILS NFR BLD AUTO: 4.61 10*3/MM3 (ref 1.7–7)
NEUTROPHILS NFR BLD AUTO: 73.6 % (ref 42.7–76)
NRBC BLD AUTO-RTO: 0 /100 WBC (ref 0–0.2)
PLATELET # BLD AUTO: 164 10*3/MM3 (ref 140–450)
PMV BLD AUTO: 10.4 FL (ref 6–12)
POTASSIUM SERPL-SCNC: 3.9 MMOL/L (ref 3.5–5.2)
PROT SERPL-MCNC: 6.5 G/DL (ref 6–8.5)
RBC # BLD AUTO: 4.7 10*6/MM3 (ref 4.14–5.8)
SODIUM SERPL-SCNC: 138 MMOL/L (ref 136–145)
WBC # BLD AUTO: 6.27 10*3/MM3 (ref 3.4–10.8)

## 2021-09-10 PROCEDURE — 94799 UNLISTED PULMONARY SVC/PX: CPT

## 2021-09-10 PROCEDURE — 85025 COMPLETE CBC W/AUTO DIFF WBC: CPT | Performed by: NURSE PRACTITIONER

## 2021-09-10 PROCEDURE — 63710000001 INSULIN LISPRO (HUMAN) PER 5 UNITS: Performed by: INTERNAL MEDICINE

## 2021-09-10 PROCEDURE — 63710000001 DIPHENHYDRAMINE PER 50 MG: Performed by: HOSPITALIST

## 2021-09-10 PROCEDURE — 82962 GLUCOSE BLOOD TEST: CPT

## 2021-09-10 PROCEDURE — 63710000001 INSULIN DETEMIR PER 5 UNITS: Performed by: INTERNAL MEDICINE

## 2021-09-10 PROCEDURE — 63710000001 INSULIN DETEMIR PER 5 UNITS: Performed by: HOSPITALIST

## 2021-09-10 PROCEDURE — 99232 SBSQ HOSP IP/OBS MODERATE 35: CPT | Performed by: HOSPITALIST

## 2021-09-10 PROCEDURE — 94640 AIRWAY INHALATION TREATMENT: CPT

## 2021-09-10 PROCEDURE — 86140 C-REACTIVE PROTEIN: CPT | Performed by: NURSE PRACTITIONER

## 2021-09-10 PROCEDURE — 80053 COMPREHEN METABOLIC PANEL: CPT | Performed by: NURSE PRACTITIONER

## 2021-09-10 PROCEDURE — 63710000001 DEXAMETHASONE PER 0.25 MG: Performed by: INTERNAL MEDICINE

## 2021-09-10 RX ORDER — NICOTINE POLACRILEX 4 MG
15 LOZENGE BUCCAL
Status: DISCONTINUED | OUTPATIENT
Start: 2021-09-10 | End: 2021-09-19 | Stop reason: HOSPADM

## 2021-09-10 RX ORDER — DEXTROSE MONOHYDRATE 25 G/50ML
25 INJECTION, SOLUTION INTRAVENOUS
Status: DISCONTINUED | OUTPATIENT
Start: 2021-09-10 | End: 2021-09-19 | Stop reason: HOSPADM

## 2021-09-10 RX ORDER — DIPHENHYDRAMINE HCL 25 MG
25 CAPSULE ORAL NIGHTLY PRN
Status: DISCONTINUED | OUTPATIENT
Start: 2021-09-10 | End: 2021-09-19 | Stop reason: HOSPADM

## 2021-09-10 RX ADMIN — INSULIN DETEMIR 10 UNITS: 100 INJECTION, SOLUTION SUBCUTANEOUS at 08:58

## 2021-09-10 RX ADMIN — REMDESIVIR 100 MG: 100 INJECTION, POWDER, LYOPHILIZED, FOR SOLUTION INTRAVENOUS at 08:58

## 2021-09-10 RX ADMIN — DEXAMETHASONE 6 MG: 4 TABLET ORAL at 08:58

## 2021-09-10 RX ADMIN — INSULIN DETEMIR 7 UNITS: 100 INJECTION, SOLUTION SUBCUTANEOUS at 22:03

## 2021-09-10 RX ADMIN — LISINOPRIL 40 MG: 40 TABLET ORAL at 08:58

## 2021-09-10 RX ADMIN — DIPHENHYDRAMINE HYDROCHLORIDE 25 MG: 25 CAPSULE ORAL at 20:37

## 2021-09-10 RX ADMIN — ALBUTEROL SULFATE 2 PUFF: 90 AEROSOL, METERED RESPIRATORY (INHALATION) at 15:26

## 2021-09-10 RX ADMIN — INSULIN LISPRO 4 UNITS: 100 INJECTION, SOLUTION INTRAVENOUS; SUBCUTANEOUS at 08:58

## 2021-09-10 RX ADMIN — ALBUTEROL SULFATE 2 PUFF: 90 AEROSOL, METERED RESPIRATORY (INHALATION) at 14:13

## 2021-09-10 RX ADMIN — Medication 60 MG: at 20:38

## 2021-09-10 RX ADMIN — ALBUTEROL SULFATE 2 PUFF: 90 AEROSOL, METERED RESPIRATORY (INHALATION) at 19:39

## 2021-09-10 RX ADMIN — INSULIN LISPRO 3 UNITS: 100 INJECTION, SOLUTION INTRAVENOUS; SUBCUTANEOUS at 12:10

## 2021-09-10 RX ADMIN — Medication 60 MG: at 09:13

## 2021-09-10 RX ADMIN — INSULIN LISPRO 4 UNITS: 100 INJECTION, SOLUTION INTRAVENOUS; SUBCUTANEOUS at 17:11

## 2021-09-10 NOTE — PLAN OF CARE
Problem: Adult Inpatient Plan of Care  Goal: Plan of Care Review  Outcome: Ongoing, Progressing  VSS. Patient remains alert and oriented x 4. C/o nagging cough managed by scheduled Delsym with adequate relief. Oxygen needs increased. Transitioned from 6L to Optiflow 40L/65%. NSR via telemetry. Up to date on POC. Otherwise, no acute events overnight. Educated on the importance of prone positioning, use of Incentive Spirometer etc. Needing reinforcement. He is UAL within room. Belongings and call light at the bedside and within reach. Continuing to monitor.     Vern Lund RN  05:18 EDT  09/10/21

## 2021-09-10 NOTE — CASE MANAGEMENT/SOCIAL WORK
Discharge Planning Assessment  [facility]     Patient Name: Flip Yu II  MRN: [id number]  Today's Date: 9/10/2021    Admit Date: 9/8/2021    Discharge Needs Assessment     Row Name 09/10/21 1242       Living Environment    Lives With  spouse    Name(s) of Who Lives With Patient  wife Leena @ [phone]    Current Living Arrangements  home/apartment/condo    Primary Care Provided by  self    Provides Primary Care For  no one    Family Caregiver if Needed  spouse    Able to Return to Prior Arrangements  yes    Living Arrangement Comments  plan home       Transition Planning    Patient/Family Anticipates Transition to  home with family    Transportation Anticipated  family or friend will provide       Discharge Needs Assessment    Equipment Currently Used at Home  none    Equipment Needed After Discharge  pulse ox    Discharge Coordination/Progress  home        Discharge Plan     Row Name 09/10/21 1243       Plan    Plan  Home    Plan Comments  I am taking most of my information from chart or nurse. Josue lives in [place] with wife Leena. I have left Leena several messages no return call. Currently, patient on HF/40L/65%. Up ad terry in the room. Kind or grumpy. CM following will need a pulse ox and maybe 02 at d/c. Meena @ 3990    Final Discharge Disposition Code  01 - home or self-care        Continued Care and Services - Admitted Since 9/8/2021    Coordination has not been started for this encounter.         Demographic Summary     Row Name 09/10/21 1240       General Information    Admission Type  inpatient    Referral Source  admission list    Reason for Consult  discharge planning    Preferred Language  English       Contact Information    Permission Granted to Share Info With      Contact Information Obtained for      Contact Information Comments  PCP: Nico Theodore, taken from chart.        Functional Status     Row Name 09/10/21 1242       Functional Status    Usual  Activity Tolerance  good    Current Activity Tolerance  moderate        Psychosocial    No documentation.       Abuse/Neglect    No documentation.       Legal    No documentation.       Substance Abuse    No documentation.       Patient Forms    No documentation.           Yana Funk RN

## 2021-09-10 NOTE — PROGRESS NOTES
[facility]  PROGRESS NOTE    Patient Name: Flip Yu II  : 1955  MRN: [id number]    Date of Admission: 2021  Primary Care Physician: Nico Theodore MD    Subjective   Subjective     CC:   Shortness of breath and nasal congestion    HPI:   Asking for Tylenol PM.  Not sleeping well it appears.  He says his head is racing and he is thinking too much.  He acknowledged he is being difficult with staff but he is not sleeping he says.  He is asking for water with ice.  Denies fevers.  Discussed ordering Benadryl since we do not have the Tylenol PM formulation in pharmacy.    ROS:    Gen- No fevers, chills  CV- No chest pain, palpitations  Resp- No cough, dyspnea  GI- No N/V/D, abd pain      Objective   Objective     Vital Signs:   Temp:  [98.2 °F (36.8 °C)-98.8 °F (37.1 °C)] 98.2 °F (36.8 °C)  Heart Rate:  [67-78] 72  Resp:  [18-23] 21  BP: (133-163)/(81-94) 133/89  Flow (L/min):  [0-40] 0     Physical Exam:    With patient's consent, physical exam was conducted via visual telemedicine encounter due to patient's current isolation requirements in the interest of PPE conservation.    Constitutional: No acute distress, awake, alert, nontoxic, normal body habitus  HENT: NCAT, MMM, no conjunctival injection  Respiratory: Good effort, nonlabored respirations   Cardiovascular:  tele with NSR  Musculoskeletal: No edema, normal muscle tone and mass for age  Skin: No visible rashes      Results Reviewed:  LAB RESULTS:      Lab 09/10/21  0658 21  0138 21   WBC 6.27  --  7.36   HEMOGLOBIN 15.0  --  16.5   HEMATOCRIT 44.2  --  47.5   PLATELETS 164  --  139*   NEUTROS ABS 4.61  --  5.74   IMMATURE GRANS (ABS) 0.04  --  0.03   LYMPHS ABS 1.00  --  1.08   MONOS ABS 0.61  --  0.49   EOS ABS 0.00  --  0.00   MCV 94.0  --  92.8   CRP 3.03* 4.34*  --    PROCALCITONIN  --   --  0.10   LACTATE  --   --  1.8   LDH  --  277*  --    D DIMER QUANT  --   --  0.57*          Lab 09/10/21  0658 09/09/21  0138 09/08/21 2005   SODIUM 138  --  135*   POTASSIUM 3.9  --  3.6   CHLORIDE 101  --  97*   CO2 26.0  --  23.0   ANION GAP 11.0  --  15.0   BUN 24*  --  19   CREATININE 0.65* 0.75* 0.81   GLUCOSE 249*  --  227*   CALCIUM 9.0  --  8.8         Lab 09/10/21  0658 09/09/21  0138 09/08/21 2005   TOTAL PROTEIN 6.5 6.9 7.2   ALBUMIN 3.30* 3.50 3.80   GLOBULIN 3.2  --  3.4   ALT (SGPT) 19 23 25   AST (SGOT) 18 25 30   BILIRUBIN 0.4 0.6 0.6   INDIRECT BILIRUBIN  --  0.4  --    BILIRUBIN DIRECT  --  0.2  --    ALK PHOS 62 61 66         Lab 09/08/21 2005   PROBNP 245.4   TROPONIN T <0.010             Lab 09/09/21 0138   FERRITIN 1,738.00*         Brief Urine Lab Results     None          Microbiology Results Abnormal     Procedure Component Value - Date/Time    Blood Culture - Blood, Arm, Right [[id number]] Collected: 09/08/21 2322    Lab Status: Preliminary result Specimen: Blood from Arm, Right Updated: 09/09/21 2345     Blood Culture No growth at 24 hours    Blood Culture - Blood, Arm, Left [[id number]] Collected: 09/08/21 2322    Lab Status: Preliminary result Specimen: Blood from Arm, Left Updated: 09/09/21 2345     Blood Culture No growth at 24 hours          XR Chest 1 View    Result Date: 9/8/2021  CR Chest 1 Vw INDICATION: Congestion tonight COMPARISON:  None available. FINDINGS: Portable AP view(s) of the chest.  Size and vascular normal. There are faint patchy infiltrates bilaterally greater on the right side than the left. The bones are normal     Impression: Subtle faint bilateral infiltrates suggesting Covid 19 pneumonia. Clinical correlation is recommended Signer Name: Ze Centeno MD  Signed: 9/8/2021 9:12 PM  Workstation Name: [email or web address]  [facility]    CT Angiogram Chest    Result Date: 9/9/2021  CTA Chest INDICATION: Elevated d-dimer. Covid pneumonia. TECHNIQUE: CT angiogram of the chest with IV contrast. 3-D reconstructions were obtained and  reviewed.   Radiation dose reduction techniques included automated exposure control or exposure modulation based on body size. Count of known CT and cardiac nuc med studies performed in previous 12 months: 0. COMPARISON: None available. FINDINGS: Multifocal parenchymal opacities throughout both lungs most prominent within the dependent and lower lung zones. Findings compatible with multifocal pneumonitis and atelectasis. No effusions. Trachea and bronchi unremarkable. No evidence of pulmonary embolus. Heart size within normal limits. Aorta free of dissection or aneurysm. Solitary gallstone without evidence of acute cholecystitis.     Impression: Multifocal parenchymal opacities most compatible with multifocal pneumonia with some superimposed atelectasis. Commonly reported imaging features of COVID-19 pneumonia are present. Other processes such as influenza pneumonia and organizing pneumonia can cause a similar imaging pattern. No evidence of pulmonary embolus. Signer Name: OMAR Alcantara MD  Signed: 9/9/2021 12:05 AM  Workstation Name: [email or web address]  [facility]          I have reviewed the medications:  Scheduled Meds:albuterol sulfate HFA, 2 puff, Inhalation, 4x Daily - RT  dexamethasone, 6 mg, Oral, Daily With Breakfast  dextromethorphan polistirex ER, 60 mg, Oral, Q12H  enoxaparin, 40 mg, Subcutaneous, Q24H  insulin detemir, 10 Units, Subcutaneous, Daily  insulin lispro, 0-7 Units, Subcutaneous, TID AC  lisinopril, 40 mg, Oral, Daily  remdesivir, 100 mg, Intravenous, Q24H      Continuous Infusions:Pharmacy Consult - Remdesivir,       PRN Meds:.•  acetaminophen  •  benzonatate  •  dextrose  •  dextrose  •  diphenhydrAMINE  •  glucagon (human recombinant)  •  Pharmacy Consult - Remdesivir    Assessment/Plan   Assessment & Plan     Active Hospital Problems    Diagnosis  POA   • **Pneumonia due to COVID-19 virus [U07.1, J12.82]  Yes   • Hypoxia [R09.02]  Unknown   • Diabetes mellitus, type  II (CMS/[facility]) [E11.9]  Unknown   • Thrombocytopenia (CMS/[facility]) [D69.6]  Unknown   • Essential hypertension [I10]  Yes      Resolved Hospital Problems   No resolved problems to display.        Brief Hospital Course to date:  Flip Yu II is a 66 y.o. male with history of obesity, unvaccinated for COVID-19 who presented to the ER with shortness of breath for 3 days.  He was found to have COVID-19 and hypoxia in the ER.  He was admitted to Hospital Medicine for Acute Hypoxia due to COVID-19.      Acute hypoxic respiratory failure  COVID-19 pneumonia  - bilateral opacities on CTA seen on admission  - currently on high flow Nasal Cannula  - will ask ID to see due to worsening oxygen requirements  - he is unvaccinated  - COVID-19 was detected on arrival in the ER  - supplemental oxygen as needed  - dexamethaxone Day # 3  - Remdesivir Day # 2    Hypertension  - currently on lisinopril  - steroids could drive pressure up    Diabetes  - check hemoglobin A1C   - BG in the 200 to 300 range  - currently on Levemir 10 Units and Insulin SS  - dexamethasone driving sugars  - adjust insulin today  - add mealtime insulin  - increase Insulin SS  - change Levemir from morning dosing to at night    Obesity  - risk factor in COVID-19 disease    Infectious Diseases evaluation tomorrow    DVT prophylaxis:  Medical DVT prophylaxis orders are present.            Disposition: I expect the patient to be discharged TBD    CODE STATUS:   Code Status and Medical Interventions:   Ordered at: 09/08/21 8812     Level Of Support Discussed With:    Patient     Code Status:    CPR     Medical Interventions (Level of Support Prior to Arrest):    Full       Zev Mistry MD  09/10/21

## 2021-09-11 LAB
ALBUMIN SERPL-MCNC: 3 G/DL (ref 3.5–5.2)
ALBUMIN/GLOB SERPL: 0.9 G/DL
ALP SERPL-CCNC: 62 U/L (ref 39–117)
ALT SERPL W P-5'-P-CCNC: 19 U/L (ref 1–41)
ANION GAP SERPL CALCULATED.3IONS-SCNC: 10 MMOL/L (ref 5–15)
AST SERPL-CCNC: 17 U/L (ref 1–40)
BASOPHILS # BLD AUTO: 0.01 10*3/MM3 (ref 0–0.2)
BASOPHILS NFR BLD AUTO: 0.1 % (ref 0–1.5)
BILIRUB SERPL-MCNC: 0.5 MG/DL (ref 0–1.2)
BUN SERPL-MCNC: 25 MG/DL (ref 8–23)
BUN/CREAT SERPL: 33.8 (ref 7–25)
CALCIUM SPEC-SCNC: 8.8 MG/DL (ref 8.6–10.5)
CHLORIDE SERPL-SCNC: 103 MMOL/L (ref 98–107)
CO2 SERPL-SCNC: 27 MMOL/L (ref 22–29)
CREAT SERPL-MCNC: 0.74 MG/DL (ref 0.76–1.27)
CRP SERPL-MCNC: 1.54 MG/DL (ref 0–0.5)
DEPRECATED RDW RBC AUTO: 39.9 FL (ref 37–54)
EOSINOPHIL # BLD AUTO: 0 10*3/MM3 (ref 0–0.4)
EOSINOPHIL NFR BLD AUTO: 0 % (ref 0.3–6.2)
ERYTHROCYTE [DISTWIDTH] IN BLOOD BY AUTOMATED COUNT: 11.6 % (ref 12.3–15.4)
GFR SERPL CREATININE-BSD FRML MDRD: 105 ML/MIN/1.73
GLOBULIN UR ELPH-MCNC: 3.3 GM/DL
GLUCOSE BLDC GLUCOMTR-MCNC: 224 MG/DL (ref 70–130)
GLUCOSE BLDC GLUCOMTR-MCNC: 270 MG/DL (ref 70–130)
GLUCOSE BLDC GLUCOMTR-MCNC: 286 MG/DL (ref 70–130)
GLUCOSE BLDC GLUCOMTR-MCNC: 312 MG/DL (ref 70–130)
GLUCOSE SERPL-MCNC: 279 MG/DL (ref 65–99)
HBA1C MFR BLD: 11.2 % (ref 4.8–5.6)
HCT VFR BLD AUTO: 43.5 % (ref 37.5–51)
HGB BLD-MCNC: 14.7 G/DL (ref 13–17.7)
IMM GRANULOCYTES # BLD AUTO: 0.03 10*3/MM3 (ref 0–0.05)
IMM GRANULOCYTES NFR BLD AUTO: 0.4 % (ref 0–0.5)
LYMPHOCYTES # BLD AUTO: 1.17 10*3/MM3 (ref 0.7–3.1)
LYMPHOCYTES NFR BLD AUTO: 14.8 % (ref 19.6–45.3)
MCH RBC QN AUTO: 31.8 PG (ref 26.6–33)
MCHC RBC AUTO-ENTMCNC: 33.8 G/DL (ref 31.5–35.7)
MCV RBC AUTO: 94.2 FL (ref 79–97)
MONOCYTES # BLD AUTO: 0.71 10*3/MM3 (ref 0.1–0.9)
MONOCYTES NFR BLD AUTO: 9 % (ref 5–12)
NEUTROPHILS NFR BLD AUTO: 5.98 10*3/MM3 (ref 1.7–7)
NEUTROPHILS NFR BLD AUTO: 75.7 % (ref 42.7–76)
NRBC BLD AUTO-RTO: 0 /100 WBC (ref 0–0.2)
PLATELET # BLD AUTO: 203 10*3/MM3 (ref 140–450)
PMV BLD AUTO: 10.5 FL (ref 6–12)
POTASSIUM SERPL-SCNC: 4.3 MMOL/L (ref 3.5–5.2)
PROT SERPL-MCNC: 6.3 G/DL (ref 6–8.5)
RBC # BLD AUTO: 4.62 10*6/MM3 (ref 4.14–5.8)
SODIUM SERPL-SCNC: 140 MMOL/L (ref 136–145)
WBC # BLD AUTO: 7.9 10*3/MM3 (ref 3.4–10.8)

## 2021-09-11 PROCEDURE — 94799 UNLISTED PULMONARY SVC/PX: CPT

## 2021-09-11 PROCEDURE — 63710000001 INSULIN LISPRO (HUMAN) PER 5 UNITS: Performed by: HOSPITALIST

## 2021-09-11 PROCEDURE — 25010000002 ENOXAPARIN PER 10 MG: Performed by: NURSE PRACTITIONER

## 2021-09-11 PROCEDURE — 83036 HEMOGLOBIN GLYCOSYLATED A1C: CPT | Performed by: HOSPITALIST

## 2021-09-11 PROCEDURE — 80053 COMPREHEN METABOLIC PANEL: CPT | Performed by: NURSE PRACTITIONER

## 2021-09-11 PROCEDURE — 63710000001 DIPHENHYDRAMINE PER 50 MG: Performed by: HOSPITALIST

## 2021-09-11 PROCEDURE — 99232 SBSQ HOSP IP/OBS MODERATE 35: CPT | Performed by: HOSPITALIST

## 2021-09-11 PROCEDURE — 86140 C-REACTIVE PROTEIN: CPT | Performed by: NURSE PRACTITIONER

## 2021-09-11 PROCEDURE — 63710000001 INSULIN DETEMIR PER 5 UNITS: Performed by: HOSPITALIST

## 2021-09-11 PROCEDURE — 82962 GLUCOSE BLOOD TEST: CPT

## 2021-09-11 PROCEDURE — 85025 COMPLETE CBC W/AUTO DIFF WBC: CPT | Performed by: NURSE PRACTITIONER

## 2021-09-11 PROCEDURE — 63710000001 DEXAMETHASONE PER 0.25 MG: Performed by: NURSE PRACTITIONER

## 2021-09-11 RX ORDER — LISINOPRIL 20 MG/1
20 TABLET ORAL DAILY
Status: DISCONTINUED | OUTPATIENT
Start: 2021-09-12 | End: 2021-09-16

## 2021-09-11 RX ADMIN — REMDESIVIR 100 MG: 100 INJECTION, POWDER, LYOPHILIZED, FOR SOLUTION INTRAVENOUS at 09:05

## 2021-09-11 RX ADMIN — INSULIN LISPRO 4 UNITS: 100 INJECTION, SOLUTION INTRAVENOUS; SUBCUTANEOUS at 09:06

## 2021-09-11 RX ADMIN — LISINOPRIL 40 MG: 40 TABLET ORAL at 09:06

## 2021-09-11 RX ADMIN — ALBUTEROL SULFATE 2 PUFF: 90 AEROSOL, METERED RESPIRATORY (INHALATION) at 15:29

## 2021-09-11 RX ADMIN — DEXAMETHASONE 10 MG: 4 TABLET ORAL at 09:04

## 2021-09-11 RX ADMIN — Medication 60 MG: at 09:05

## 2021-09-11 RX ADMIN — ALBUTEROL SULFATE 2 PUFF: 90 AEROSOL, METERED RESPIRATORY (INHALATION) at 19:38

## 2021-09-11 RX ADMIN — INSULIN DETEMIR 12 UNITS: 100 INJECTION, SOLUTION SUBCUTANEOUS at 20:42

## 2021-09-11 RX ADMIN — Medication 60 MG: at 20:42

## 2021-09-11 RX ADMIN — ALBUTEROL SULFATE 2 PUFF: 90 AEROSOL, METERED RESPIRATORY (INHALATION) at 12:45

## 2021-09-11 RX ADMIN — INSULIN LISPRO 4 UNITS: 100 INJECTION, SOLUTION INTRAVENOUS; SUBCUTANEOUS at 18:24

## 2021-09-11 RX ADMIN — INSULIN LISPRO 4 UNITS: 100 INJECTION, SOLUTION INTRAVENOUS; SUBCUTANEOUS at 12:22

## 2021-09-11 RX ADMIN — INSULIN LISPRO 8 UNITS: 100 INJECTION, SOLUTION INTRAVENOUS; SUBCUTANEOUS at 09:06

## 2021-09-11 RX ADMIN — DIPHENHYDRAMINE HYDROCHLORIDE 25 MG: 25 CAPSULE ORAL at 20:42

## 2021-09-11 RX ADMIN — INSULIN LISPRO 5 UNITS: 100 INJECTION, SOLUTION INTRAVENOUS; SUBCUTANEOUS at 12:22

## 2021-09-11 RX ADMIN — ENOXAPARIN SODIUM 40 MG: 40 INJECTION SUBCUTANEOUS at 09:05

## 2021-09-11 RX ADMIN — INSULIN LISPRO 8 UNITS: 100 INJECTION, SOLUTION INTRAVENOUS; SUBCUTANEOUS at 18:24

## 2021-09-11 NOTE — PLAN OF CARE
Goal Outcome Evaluation:  Plan of Care Reviewed With: patient        Progress: no change  Outcome Summary: Pt VSS. Remains on HFNC. JEFFRY Good RN

## 2021-09-11 NOTE — PROGRESS NOTES
[facility] Medicine Services  PROGRESS NOTE    Patient Name: Flip Yu II  : 1955  MRN: [id number]    Date of Admission: 2021  Primary Care Physician: Nico Theodore MD    Subjective   Subjective     CC:   Shortness of breath and nasal congestion    HPI:   Able to wean some on the high flow.  Afebrile.  No issues reported today.  Decadron dose increased today    ROS:    Gen- No fevers, chills  CV- No chest pain, palpitations  Resp- No cough, dyspnea  GI- No N/V/D, abd pain      Objective   Objective     Vital Signs:   Temp:  [97.6 °F (36.4 °C)-98.6 °F (37 °C)] 97.6 °F (36.4 °C)  Heart Rate:  [51-71] 62  Resp:  [18-20] 20  BP: ()/(65-77) 95/65  Flow (L/min):  [40-50] 45     Physical Exam:    With patient's consent, physical exam was conducted via visual telemedicine encounter due to patient's current isolation requirements in the interest of PPE conservation.    Constitutional: No acute distress, awake, alert, nontoxic, normal body habitus  HENT: NCAT, MMM, no conjunctival injection  Respiratory: Good effort, nonlabored respirations   Cardiovascular:  tele with NSR  Musculoskeletal: No edema, normal muscle tone and mass for age  Skin: No visible rashes      Results Reviewed:  LAB RESULTS:      Lab 21  0419 09/10/21  0658 21  0138 21   WBC 7.90 6.27  --  7.36   HEMOGLOBIN 14.7 15.0  --  16.5   HEMATOCRIT 43.5 44.2  --  47.5   PLATELETS 203 164  --  139*   NEUTROS ABS 5.98 4.61  --  5.74   IMMATURE GRANS (ABS) 0.03 0.04  --  0.03   LYMPHS ABS 1.17 1.00  --  1.08   MONOS ABS 0.71 0.61  --  0.49   EOS ABS 0.00 0.00  --  0.00   MCV 94.2 94.0  --  92.8   CRP 1.54* 3.03* 4.34*  --    PROCALCITONIN  --   --   --  0.10   LACTATE  --   --   --  1.8   LDH  --   --  277*  --    D DIMER QUANT  --   --   --  0.57*         Lab 21  0420 21  0419 09/10/21  0658 21  0138 21   SODIUM  --  140 138  --  135*   POTASSIUM  --  4.3  3.9  --  3.6   CHLORIDE  --  103 101  --  97*   CO2  --  27.0 26.0  --  23.0   ANION GAP  --  10.0 11.0  --  15.0   BUN  --  25* 24*  --  19   CREATININE  --  0.74* 0.65* 0.75* 0.81   GLUCOSE  --  279* 249*  --  227*   CALCIUM  --  8.8 9.0  --  8.8   HEMOGLOBIN A1C 11.20*  --   --   --   --          Lab 09/11/21  0419 09/10/21  0658 09/09/21  0138 09/08/21 2005   TOTAL PROTEIN 6.3 6.5 6.9 7.2   ALBUMIN 3.00* 3.30* 3.50 3.80   GLOBULIN 3.3 3.2  --  3.4   ALT (SGPT) 19 19 23 25   AST (SGOT) 17 18 25 30   BILIRUBIN 0.5 0.4 0.6 0.6   INDIRECT BILIRUBIN  --   --  0.4  --    BILIRUBIN DIRECT  --   --  0.2  --    ALK PHOS 62 62 61 66         Lab 09/08/21 2005   PROBNP 245.4   TROPONIN T <0.010             Lab 09/09/21 0138   FERRITIN 1,738.00*         Brief Urine Lab Results     None          Microbiology Results Abnormal     Procedure Component Value - Date/Time    Blood Culture - Blood, Arm, Right [[id number]] Collected: 09/08/21 2322    Lab Status: Preliminary result Specimen: Blood from Arm, Right Updated: 09/10/21 2345     Blood Culture No growth at 2 days    Blood Culture - Blood, Arm, Left [[id number]] Collected: 09/08/21 2322    Lab Status: Preliminary result Specimen: Blood from Arm, Left Updated: 09/10/21 2345     Blood Culture No growth at 2 days          No radiology results from the last 24 hrs        I have reviewed the medications:  Scheduled Meds:albuterol sulfate HFA, 2 puff, Inhalation, 4x Daily - RT  dexamethasone, 10 mg, Oral, Daily With Breakfast  dextromethorphan polistirex ER, 60 mg, Oral, Q12H  enoxaparin, 40 mg, Subcutaneous, Q24H  insulin detemir, 7 Units, Subcutaneous, Nightly  insulin lispro, 0-14 Units, Subcutaneous, TID AC  insulin lispro, 4 Units, Subcutaneous, TID With Meals  lisinopril, 40 mg, Oral, Daily  remdesivir, 100 mg, Intravenous, Q24H      Continuous Infusions:Pharmacy Consult - Remdesivir,       PRN Meds:.•  acetaminophen  •  benzonatate  •  dextrose  •  dextrose  •   diphenhydrAMINE  •  glucagon (human recombinant)  •  Pharmacy Consult - Remdesivir    Assessment/Plan   Assessment & Plan     Active Hospital Problems    Diagnosis  POA   • **Pneumonia due to COVID-19 virus [U07.1, J12.82]  Yes   • Hypoxia [R09.02]  Unknown   • Diabetes mellitus, type II (CMS/HCC) [E11.9]  Unknown   • Thrombocytopenia (CMS/HCC) [D69.6]  Unknown   • Essential hypertension [I10]  Yes      Resolved Hospital Problems   No resolved problems to display.        Brief Hospital Course to date:  Flip Yu II is a 66 y.o. male with history of obesity, unvaccinated for COVID-19 who presented to the ER with shortness of breath for 3 days.  He was found to have COVID-19 and hypoxia in the ER.  He was admitted to Hospital Medicine for Acute Hypoxia due to COVID-19.      Acute hypoxic respiratory failure  COVID-19 pneumonia  - bilateral opacities on CTA seen on admission  - currently on high flow Nasal Cannula  - will ask ID to see due to worsening oxygen requirements  - he is unvaccinated  - COVID-19 was detected on arrival in the ER  - supplemental oxygen as needed  - dexamethaxone Day # 4 - dose increased today to 10 mg  - Remdesivir Day # 3    Hypertension  - currently on lisinopril  - steroids could drive pressure up  - lower bp today  - will decrease lisinopril dose today    Diabetes  - hemoglobin A1C ~ 11.2  - BG in the 200 to 300 range  - Diabetes educator  - Adjust insulin today - getting increased dose of dexamethasone    Obesity  - risk factor in COVID-19 disease    Infectious Diseases evaluation - increased dexamethasone dose    DVT prophylaxis:  Medical DVT prophylaxis orders are present.            Disposition: I expect the patient to be discharged TBD    CODE STATUS:   Code Status and Medical Interventions:   Ordered at: 09/08/21 3793     Level Of Support Discussed With:    Patient     Code Status:    CPR     Medical Interventions (Level of Support Prior to Arrest):    Full       Zev Mistry,  MD  09/11/21

## 2021-09-11 NOTE — CONSULTS
"INFECTIOUS DISEASE CONSULT/INITIAL HOSPITAL VISIT    Flip Yu II  1955  [id number]    Date of Consult: 9/11/2021    Admission Date: 9/8/2021      Requesting Provider: Fede  Evaluating Physician: WILFREDO Bowling    Reason for Consultation: COVID 19 pneumonia     History of present illness:    Patient is a 66 y.o. male, unvaccinated, PMH HTN, seen today for COVID 19 pneumonia. He presented to the ED with complaints of nasal congestion, cough over the past several weeks.  Oxygen saturation of 89% ini the ED.  CXR with subtle faint bilateral infiltrates, CTA chest with multifocal parenchymal opacities compatible with multifocal pneumonia. Admitting labs with WBC 7.36, PCT 0.10, CRP 4.34, ferritin 1738,  And he has been afebrile.   He is now on 45L70% FIO2 HFNC.  He was started on Remdesivir and Dexamethasone and we were consulted for evaluated and treatment. Feels \"like crap\"    Past Medical History:   Diagnosis Date   • Diabetes (CMS/HCC)    • Hypertension        History reviewed. No pertinent surgical history.    Family History   Problem Relation Age of Onset   • No Known Problems Mother        Social History     Socioeconomic History   • Marital status:      Spouse name: Not on file   • Number of children: Not on file   • Years of education: Not on file   • Highest education level: Not on file   Tobacco Use   • Smoking status: Never Smoker   • Smokeless tobacco: Never Used   Vaping Use   • Vaping Use: Never used   Substance and Sexual Activity   • Alcohol use: Never   • Drug use: Never   • Sexual activity: Defer       No Known Allergies      Medication:    Current Facility-Administered Medications:   •  acetaminophen (TYLENOL) tablet 650 mg, 650 mg, Oral, Q6H PRN, Sindy Ceja DO  •  albuterol sulfate HFA (PROVENTIL HFA;VENTOLIN HFA;PROAIR HFA) inhaler 2 puff, 2 puff, Inhalation, 4x Daily - RT, Sindy Ceja DO, 2 puff at 09/10/21 1939  •  benzonatate (TESSALON) capsule 200 mg, " 200 mg, Oral, TID PRN, Marcial, Sindy G, DO, 200 mg at 09/09/21 0332  •  dexamethasone (DECADRON) tablet 6 mg, 6 mg, Oral, Daily With Breakfast, Celestina Drummond MD, 6 mg at 09/10/21 0858  •  dextromethorphan polistirex ER (DELSYM) 30 MG/5ML oral suspension 60 mg, 60 mg, Oral, Q12H, Marcial, Sindy G, DO, 60 mg at 09/10/21 2038  •  dextrose (D50W) 25 g/ 50mL Intravenous Solution 25 g, 25 g, Intravenous, Q15 Min PRN, Zev Mistry MD  •  dextrose (GLUTOSE) oral gel 15 g, 15 g, Oral, Q15 Min PRN, Zev Mistry MD  •  diphenhydrAMINE (BENADRYL) capsule 25 mg, 25 mg, Oral, Nightly PRN, Zev Mistry MD, 25 mg at 09/10/21 2037  •  enoxaparin (LOVENOX) syringe 40 mg, 40 mg, Subcutaneous, Q24H, Emily, Judy, APRN, 40 mg at 09/09/21 2052  •  glucagon (human recombinant) (GLUCAGEN DIAGNOSTIC) injection 1 mg, 1 mg, Subcutaneous, Q15 Min PRN, Zev Mistry MD  •  insulin detemir (LEVEMIR) injection 7 Units, 7 Units, Subcutaneous, Nightly, Zev Mistry MD, 7 Units at 09/10/21 2203  •  insulin lispro (humaLOG) injection 0-14 Units, 0-14 Units, Subcutaneous, TID AC, Zev Mistry MD  •  insulin lispro (humaLOG) injection 4 Units, 4 Units, Subcutaneous, TID With Meals, Zev Mistry MD  •  lisinopril (PRINIVIL,ZESTRIL) tablet 40 mg, 40 mg, Oral, Daily, Marcial, Sindy G, DO, 40 mg at 09/10/21 0858  •  Pharmacy Consult - Remdesivir, , Does not apply, Continuous PRN, Emily, Judy, APRN  •  [COMPLETED] remdesivir 200 mg in sodium chloride 0.9 % 290 mL IVPB (powder vial), 200 mg, Intravenous, Q24H, 200 mg at 09/09/21 0157 **FOLLOWED BY** remdesivir 100 mg in sodium chloride 0.9 % 270 mL IVPB (powder vial), 100 mg, Intravenous, Q24H, Judy Diaz APRN, 100 mg at 09/10/21 0858    Antibiotics:  Anti-Infectives (From admission, onward)    Ordered     Dose/Rate Route Frequency Start Stop    09/09/21 0022  remdesivir 100 mg in sodium chloride 0.9 % 270 mL IVPB (powder vial)     Ordering Provider: Judy Diaz  APRN    100 mg  over 60 Minutes Intravenous Every 24 Hours 09/10/21 0900 21 0859    21 0022  remdesivir 200 mg in sodium chloride 0.9 % 290 mL IVPB (powder vial)     Ordering Provider: Emily Judy APRN    200 mg  over 60 Minutes Intravenous Every 24 Hours 21 0230 21 0257            Review of Systems:  Constitutional-- See HPI  HEENT-- No new vision, hearing or throat complaints. + nasal congestion  CV-- No chest pain, palpitation or syncope  Resp-- +SOB/cough  GI- No nausea, vomiting, or diarrhea.  No hematochezia, melena, or hematemesis. Denies jaundice or chronic liver disease.  -- No dysuria, hematuria, or flank pain.  Denies hesitancy, urgency or flank pain.  Lymph- no swollen lymph nodes in neck/axilla or groin.   Heme- No active bruising or bleeding; no Hx of DVT or PE.  MS-- no swelling or pain in the bones or joints of arms/legs.  No new back pain.  Neuro-- No acute focal weakness or numbness in the arms or legs.  No seizures.  Skin--No rashes or lesions      Physical Exam:   Vital Signs  Temp (24hrs), Av.4 °F (36.9 °C), Min:98.1 °F (36.7 °C), Max:98.6 °F (37 °C)    Temp  Min: 98.1 °F (36.7 °C)  Max: 98.6 °F (37 °C)  BP  Min: 129/68  Max: 137/77  Pulse  Min: 51  Max: 71  Resp  Min: 18  Max: 20  SpO2  Min: 91 %  Max: 97 %    I directly examined the patient with the appropirate PPE    GENERAL: Awake and alert, mild distress   HEENT: Normocephalic, atraumatic   NECK: Supple  HEART: RRR; No murmur,    LUNGS:normal respiratory effort. On HFNC. + dry cough noted  ABDOMEN: , nondistended. Non-tender  EXT:  No  edema.   :  Without Rivas catheter.  MSK:  No joint effusions   SKIN: Warm and dry without cutaneous eruptions on Inspection/palpation.    NEURO: Oriented to PPT..  PSYCHIATRIC: Normal insight and judgement. Cooperative with PE    Laboratory Data    Results from last 7 days   Lab Units 21  0419 09/10/21  0658 21   WBC 10*3/mm3 7.90 6.27 7.36   HEMOGLOBIN  g/dL 14.7 15.0 16.5   HEMATOCRIT % 43.5 44.2 47.5   PLATELETS 10*3/mm3 203 164 139*     Results from last 7 days   Lab Units 09/11/21 0419   SODIUM mmol/L 140   POTASSIUM mmol/L 4.3   CHLORIDE mmol/L 103   CO2 mmol/L 27.0   BUN mg/dL 25*   CREATININE mg/dL 0.74*   GLUCOSE mg/dL 279*   CALCIUM mg/dL 8.8     Results from last 7 days   Lab Units 09/11/21 0419 09/10/21  0658 09/09/21  0138   ALK PHOS U/L 62   < > 61   BILIRUBIN mg/dL 0.5   < > 0.6   BILIRUBIN DIRECT mg/dL  --   --  0.2   ALT (SGPT) U/L 19   < > 23   AST (SGOT) U/L 17   < > 25    < > = values in this interval not displayed.         Results from last 7 days   Lab Units 09/11/21 0419   CRP mg/dL 1.54*     Results from last 7 days   Lab Units 09/08/21 2005   LACTATE mmol/L 1.8             Estimated Creatinine Clearance: 112.6 mL/min (A) (by C-G formula based on SCr of 0.74 mg/dL (L)).      Microbiology:  Blood Culture   Date Value Ref Range Status   09/08/2021 No growth at 2 days  Preliminary   09/08/2021 No growth at 2 days  Preliminary          Radiology:  Imaging Results (Last 72 Hours)     Procedure Component Value Units Date/Time    CT Angiogram Chest [[id number]] Collected: 09/09/21 0005     Updated: 09/09/21 0007    Narrative:      CTA Chest    INDICATION:   Elevated d-dimer. Covid pneumonia.    TECHNIQUE:   CT angiogram of the chest with IV contrast. 3-D reconstructions were obtained and reviewed.   Radiation dose reduction techniques included automated exposure control or exposure modulation based on body size. Count of known CT and cardiac nuc med studies  performed in previous 12 months: 0.     COMPARISON:   None available.    FINDINGS:   Multifocal parenchymal opacities throughout both lungs most prominent within the dependent and lower lung zones. Findings compatible with multifocal pneumonitis and atelectasis. No effusions. Trachea and bronchi unremarkable.    No evidence of pulmonary embolus. Heart size within normal limits. Aorta free of  dissection or aneurysm. Solitary gallstone without evidence of acute cholecystitis.      Impression:      Multifocal parenchymal opacities most compatible with multifocal pneumonia with some superimposed atelectasis. Commonly reported imaging features of COVID-19 pneumonia are present. Other processes such as influenza pneumonia and organizing pneumonia can  cause a similar imaging pattern.    No evidence of pulmonary embolus.    Signer Name: OMAR Alcantara MD   Signed: 9/9/2021 12:05 AM   Workstation Name: [facility]    [facility]    XR Chest 1 View [[id number]] Collected: 09/08/21 2112     Updated: 09/08/21 2114    Narrative:      CR Chest 1 Vw    INDICATION:   Congestion tonight     COMPARISON:    None available.    FINDINGS:  Portable AP view(s) of the chest.  Size and vascular normal. There are faint patchy infiltrates bilaterally greater on the right side than the left. The bones are normal      Impression:      Subtle faint bilateral infiltrates suggesting Covid 19 pneumonia. Clinical correlation is recommended    Signer Name: Ze Centeno MD   Signed: 9/8/2021 9:12 PM   Workstation Name: [facility]    [facility]        I personally reviewed the above CXR: patchy bilateral lower lobe infiltrates      Impression:   - Severe COVID 19 pneumonia, with multifocal parenchymal opacities by CTA chest, CRP 4, now improving.   - Acute hypoxic respiratory failure, now on HFNC: Negative procalcitonin. Unlikely bacterial coinfection. CTA negative for PE  - HTN  - poorly controlled DM2: A1c 11.2    PLAN/RECOMMENDATIONS:   -Blood cultures pending   - Follow CBC, CMP, CRP    -Continue Dexamethasone but increase to 10 mg daily for 5 days  -Continue Remdesivir for 5 days. Currently day 3    Due to worsening respiratory failure we discussed the risk/benefit of Actemra. Inflammatory markers actually improving but O2 requirement worse. Discussed risk of immunosuppression.  "Patient indicates that he will refuse any \"experimental\" treatment at this time. He is not interested at Actemra even if he gets worse. He says he is agreeable to continuing remdesivir and dexamethasone    - Covid isolation for 20 days from symptom onset  - DVT ppx while inpatient.      Complex medical decision making. High risk for further decompensation. I will follow      has obtained the history, performed the physical exam and formulated the above treatment plan.     Apolonia Treviño, WILFREDO  9/11/2021  08:20 EDT    •               "

## 2021-09-12 LAB
ALBUMIN SERPL-MCNC: 3.4 G/DL (ref 3.5–5.2)
ALBUMIN/GLOB SERPL: 1 G/DL
ALP SERPL-CCNC: 71 U/L (ref 39–117)
ALT SERPL W P-5'-P-CCNC: 21 U/L (ref 1–41)
ANION GAP SERPL CALCULATED.3IONS-SCNC: 12 MMOL/L (ref 5–15)
AST SERPL-CCNC: 19 U/L (ref 1–40)
BASOPHILS # BLD AUTO: 0.01 10*3/MM3 (ref 0–0.2)
BASOPHILS NFR BLD AUTO: 0.1 % (ref 0–1.5)
BILIRUB SERPL-MCNC: 0.6 MG/DL (ref 0–1.2)
BUN SERPL-MCNC: 22 MG/DL (ref 8–23)
BUN/CREAT SERPL: 29.3 (ref 7–25)
CALCIUM SPEC-SCNC: 9 MG/DL (ref 8.6–10.5)
CHLORIDE SERPL-SCNC: 102 MMOL/L (ref 98–107)
CO2 SERPL-SCNC: 24 MMOL/L (ref 22–29)
CREAT SERPL-MCNC: 0.75 MG/DL (ref 0.76–1.27)
CRP SERPL-MCNC: 0.82 MG/DL (ref 0–0.5)
DEPRECATED RDW RBC AUTO: 39.7 FL (ref 37–54)
EOSINOPHIL # BLD AUTO: 0 10*3/MM3 (ref 0–0.4)
EOSINOPHIL NFR BLD AUTO: 0 % (ref 0.3–6.2)
ERYTHROCYTE [DISTWIDTH] IN BLOOD BY AUTOMATED COUNT: 11.4 % (ref 12.3–15.4)
GFR SERPL CREATININE-BSD FRML MDRD: 104 ML/MIN/1.73
GLOBULIN UR ELPH-MCNC: 3.3 GM/DL
GLUCOSE BLDC GLUCOMTR-MCNC: 252 MG/DL (ref 70–130)
GLUCOSE BLDC GLUCOMTR-MCNC: 262 MG/DL (ref 70–130)
GLUCOSE BLDC GLUCOMTR-MCNC: 264 MG/DL (ref 70–130)
GLUCOSE BLDC GLUCOMTR-MCNC: 292 MG/DL (ref 70–130)
GLUCOSE SERPL-MCNC: 258 MG/DL (ref 65–99)
HCT VFR BLD AUTO: 46.6 % (ref 37.5–51)
HGB BLD-MCNC: 15.5 G/DL (ref 13–17.7)
IMM GRANULOCYTES # BLD AUTO: 0.04 10*3/MM3 (ref 0–0.05)
IMM GRANULOCYTES NFR BLD AUTO: 0.5 % (ref 0–0.5)
LYMPHOCYTES # BLD AUTO: 1.39 10*3/MM3 (ref 0.7–3.1)
LYMPHOCYTES NFR BLD AUTO: 16.8 % (ref 19.6–45.3)
MCH RBC QN AUTO: 31.6 PG (ref 26.6–33)
MCHC RBC AUTO-ENTMCNC: 33.3 G/DL (ref 31.5–35.7)
MCV RBC AUTO: 95.1 FL (ref 79–97)
MONOCYTES # BLD AUTO: 0.79 10*3/MM3 (ref 0.1–0.9)
MONOCYTES NFR BLD AUTO: 9.5 % (ref 5–12)
NEUTROPHILS NFR BLD AUTO: 6.05 10*3/MM3 (ref 1.7–7)
NEUTROPHILS NFR BLD AUTO: 73.1 % (ref 42.7–76)
NRBC BLD AUTO-RTO: 0 /100 WBC (ref 0–0.2)
PLATELET # BLD AUTO: 235 10*3/MM3 (ref 140–450)
PMV BLD AUTO: 10.4 FL (ref 6–12)
POTASSIUM SERPL-SCNC: 4.1 MMOL/L (ref 3.5–5.2)
PROT SERPL-MCNC: 6.7 G/DL (ref 6–8.5)
RBC # BLD AUTO: 4.9 10*6/MM3 (ref 4.14–5.8)
SODIUM SERPL-SCNC: 138 MMOL/L (ref 136–145)
WBC # BLD AUTO: 8.28 10*3/MM3 (ref 3.4–10.8)

## 2021-09-12 PROCEDURE — 63710000001 DEXAMETHASONE PER 0.25 MG: Performed by: NURSE PRACTITIONER

## 2021-09-12 PROCEDURE — 85025 COMPLETE CBC W/AUTO DIFF WBC: CPT | Performed by: NURSE PRACTITIONER

## 2021-09-12 PROCEDURE — 63710000001 INSULIN LISPRO (HUMAN) PER 5 UNITS: Performed by: HOSPITALIST

## 2021-09-12 PROCEDURE — 63710000001 INSULIN DETEMIR PER 5 UNITS: Performed by: HOSPITALIST

## 2021-09-12 PROCEDURE — 80053 COMPREHEN METABOLIC PANEL: CPT | Performed by: NURSE PRACTITIONER

## 2021-09-12 PROCEDURE — 25010000002 ENOXAPARIN PER 10 MG: Performed by: NURSE PRACTITIONER

## 2021-09-12 PROCEDURE — 94799 UNLISTED PULMONARY SVC/PX: CPT

## 2021-09-12 PROCEDURE — 99232 SBSQ HOSP IP/OBS MODERATE 35: CPT | Performed by: HOSPITALIST

## 2021-09-12 PROCEDURE — 82962 GLUCOSE BLOOD TEST: CPT

## 2021-09-12 PROCEDURE — 86140 C-REACTIVE PROTEIN: CPT | Performed by: NURSE PRACTITIONER

## 2021-09-12 RX ADMIN — INSULIN LISPRO 7 UNITS: 100 INJECTION, SOLUTION INTRAVENOUS; SUBCUTANEOUS at 08:31

## 2021-09-12 RX ADMIN — LISINOPRIL 20 MG: 20 TABLET ORAL at 08:30

## 2021-09-12 RX ADMIN — INSULIN LISPRO 8 UNITS: 100 INJECTION, SOLUTION INTRAVENOUS; SUBCUTANEOUS at 08:31

## 2021-09-12 RX ADMIN — DEXAMETHASONE 10 MG: 4 TABLET ORAL at 08:30

## 2021-09-12 RX ADMIN — Medication 60 MG: at 21:58

## 2021-09-12 RX ADMIN — REMDESIVIR 100 MG: 100 INJECTION, POWDER, LYOPHILIZED, FOR SOLUTION INTRAVENOUS at 08:30

## 2021-09-12 RX ADMIN — Medication 60 MG: at 08:30

## 2021-09-12 RX ADMIN — INSULIN LISPRO 8 UNITS: 100 INJECTION, SOLUTION INTRAVENOUS; SUBCUTANEOUS at 13:13

## 2021-09-12 RX ADMIN — ALBUTEROL SULFATE 2 PUFF: 90 AEROSOL, METERED RESPIRATORY (INHALATION) at 17:38

## 2021-09-12 RX ADMIN — INSULIN LISPRO 7 UNITS: 100 INJECTION, SOLUTION INTRAVENOUS; SUBCUTANEOUS at 13:13

## 2021-09-12 RX ADMIN — INSULIN LISPRO 7 UNITS: 100 INJECTION, SOLUTION INTRAVENOUS; SUBCUTANEOUS at 17:43

## 2021-09-12 RX ADMIN — INSULIN DETEMIR 20 UNITS: 100 INJECTION, SOLUTION SUBCUTANEOUS at 21:58

## 2021-09-12 RX ADMIN — ALBUTEROL SULFATE 2 PUFF: 90 AEROSOL, METERED RESPIRATORY (INHALATION) at 20:38

## 2021-09-12 RX ADMIN — ENOXAPARIN SODIUM 40 MG: 40 INJECTION SUBCUTANEOUS at 08:30

## 2021-09-12 RX ADMIN — INSULIN LISPRO 8 UNITS: 100 INJECTION, SOLUTION INTRAVENOUS; SUBCUTANEOUS at 17:43

## 2021-09-12 NOTE — PROGRESS NOTES
"INFECTIOUS DISEASE Progress note     Flip Yu II  1955  [id number]    Admission Date: 9/8/2021    Requesting Provider: Fede  Evaluating Physician: Jose Martin    Reason for Consultation: COVID 19 pneumonia     History of present illness:    Patient is a 66 y.o. male, unvaccinated, PMH HTN, seen today for COVID 19 pneumonia. He presented to the ED with complaints of nasal congestion, cough over the past several weeks.  Oxygen saturation of 89% ini the ED.  CXR with subtle faint bilateral infiltrates, CTA chest with multifocal parenchymal opacities compatible with multifocal pneumonia. Admitting labs with WBC 7.36, PCT 0.10, CRP 4.34, ferritin 1738,  And he has been afebrile.   He is now on 45L70% FIO2 HFNC.  He was started on Remdesivir and Dexamethasone and we were consulted for evaluated and treatment. Feels \"like crap\"    9/12/21: he remains afebrile.  He remains on HFNC 45L/65% FIO2.  CRP improved. Feeling better today.     ROS:  No fevers or chills. No n/v/d. No rash. No new ADR to Abx.       No Known Allergies      Medication:    Current Facility-Administered Medications:   •  acetaminophen (TYLENOL) tablet 650 mg, 650 mg, Oral, Q6H PRN, Marcial, Sindy G, DO  •  albuterol sulfate HFA (PROVENTIL HFA;VENTOLIN HFA;PROAIR HFA) inhaler 2 puff, 2 puff, Inhalation, 4x Daily - RT, Marcial, Sindy G, DO, 2 puff at 09/11/21 1938  •  benzonatate (TESSALON) capsule 200 mg, 200 mg, Oral, TID PRN, Marcial, Sindy G, DO, 200 mg at 09/09/21 0332  •  dexamethasone (DECADRON) tablet 10 mg, 10 mg, Oral, Daily With Breakfast, Apolonia Treviño APRN, 10 mg at 09/12/21 0830  •  dextromethorphan polistirex ER (DELSYM) 30 MG/5ML oral suspension 60 mg, 60 mg, Oral, Q12H, Marcial, Sindy G, DO, 60 mg at 09/12/21 0830  •  dextrose (D50W) 25 g/ 50mL Intravenous Solution 25 g, 25 g, Intravenous, Q15 Min PRN, Zev Mistry MD  •  dextrose (GLUTOSE) oral gel 15 g, 15 g, Oral, Q15 Min PRN, Zev Mistry MD  •  diphenhydrAMINE " (BENADRYL) capsule 25 mg, 25 mg, Oral, Nightly PRN, Zev Mistry MD, 25 mg at 21  •  enoxaparin (LOVENOX) syringe 40 mg, 40 mg, Subcutaneous, Q24H, Yajaira Diaza, APRN, 40 mg at 21  •  glucagon (human recombinant) (GLUCAGEN DIAGNOSTIC) injection 1 mg, 1 mg, Subcutaneous, Q15 Min PRN, Zev Mistry MD  •  insulin detemir (LEVEMIR) injection 12 Units, 12 Units, Subcutaneous, Nightly, Zev Mistry MD, 12 Units at 21  •  insulin lispro (humaLOG) injection 0-14 Units, 0-14 Units, Subcutaneous, TID AC, Zev Mistry MD, 8 Units at 21  •  insulin lispro (humaLOG) injection 7 Units, 7 Units, Subcutaneous, TID With Meals, Zev Mistry MD, 7 Units at 21  •  lisinopril (PRINIVIL,ZESTRIL) tablet 20 mg, 20 mg, Oral, Daily, Zev Mistry MD, 20 mg at 21  •  Pharmacy Consult - Remdesivir, , Does not apply, Continuous PRN, Judy Diaz APRN  •  [COMPLETED] remdesivir 200 mg in sodium chloride 0.9 % 290 mL IVPB (powder vial), 200 mg, Intravenous, Q24H, 200 mg at 21 0157 **FOLLOWED BY** remdesivir 100 mg in sodium chloride 0.9 % 270 mL IVPB (powder vial), 100 mg, Intravenous, Q24H, Judy Diaz APRN, 100 mg at 2130    Antibiotics:  Anti-Infectives (From admission, onward)    Ordered     Dose/Rate Route Frequency Start Stop    21 0022  remdesivir 100 mg in sodium chloride 0.9 % 270 mL IVPB (powder vial)     Ordering Provider: Judy Diaz APRN    100 mg  over 60 Minutes Intravenous Every 24 Hours 09/10/21 0900 21 0859    21 0022  remdesivir 200 mg in sodium chloride 0.9 % 290 mL IVPB (powder vial)     Ordering Provider: Judy Diaz APRN    200 mg  over 60 Minutes Intravenous Every 24 Hours 21 0257           Physical Exam:   Vital Signs  Temp (24hrs), Av.2 °F (36.8 °C), Min:97.6 °F (36.4 °C), Max:98.4 °F (36.9 °C)    Temp  Min: 97.6 °F (36.4 °C)  Max: 98.4 °F (36.9 °C)  BP   Min: 95/65  Max: 146/84  Pulse  Min: 47  Max: 65  Resp  Min: 18  Max: 20  SpO2  Min: 92 %  Max: 96 %    Due to the COVID-19 pandemic, and in an effort to preserve PPE, I did not directly examine the patient but directly visualized him through the window and spoke to him via telephone.    GENERAL: Awake and alert, mild distress   HEENT: Normocephalic, atraumatic   NECK: Supple  HEART: RRR; No murmur,    LUNGS: normal respiratory effort. On HFNC. + dry cough noted  ABDOMEN: , nondistended. Non-tender  EXT:  No edema.   :  Without Rivas catheter.  MSK:  No joint effusions   SKIN: Warm and dry without cutaneous eruptions on Inspection/palpation.    NEURO: Oriented to PPT..  PSYCHIATRIC: Normal insight and judgement. Cooperative with PE    Laboratory Data    Results from last 7 days   Lab Units 09/12/21  0542 09/11/21  0419 09/10/21  0658   WBC 10*3/mm3 8.28 7.90 6.27   HEMOGLOBIN g/dL 15.5 14.7 15.0   HEMATOCRIT % 46.6 43.5 44.2   PLATELETS 10*3/mm3 235 203 164     Results from last 7 days   Lab Units 09/12/21  0542   SODIUM mmol/L 138   POTASSIUM mmol/L 4.1   CHLORIDE mmol/L 102   CO2 mmol/L 24.0   BUN mg/dL 22   CREATININE mg/dL 0.75*   GLUCOSE mg/dL 258*   CALCIUM mg/dL 9.0     Results from last 7 days   Lab Units 09/12/21  0542 09/10/21  0658 09/09/21  0138   ALK PHOS U/L 71   < > 61   BILIRUBIN mg/dL 0.6   < > 0.6   BILIRUBIN DIRECT mg/dL  --   --  0.2   ALT (SGPT) U/L 21   < > 23   AST (SGOT) U/L 19   < > 25    < > = values in this interval not displayed.         Results from last 7 days   Lab Units 09/12/21  0542   CRP mg/dL 0.82*     Results from last 7 days   Lab Units 09/08/21 2005   LACTATE mmol/L 1.8             Estimated Creatinine Clearance: 113.1 mL/min (A) (by C-G formula based on SCr of 0.75 mg/dL (L)).      Microbiology:  9/8 blood cx pending, so far neg      Radiology:  Imaging Results (Last 72 Hours)     ** No results found for the last 72 hours. **        I personally reviewed the above CXR:  patchy bilateral lower lobe infiltrates      Impression:   - Severe COVID 19 pneumonia, with multifocal parenchymal opacities by CTA chest, CRP 4, now improving.   - Acute hypoxic respiratory failure, now on HFNC: Negative procalcitonin. Unlikely bacterial coinfection. CTA negative for PE  - HTN  - poorly controlled DM2: A1c 11.2    PLAN/RECOMMENDATIONS:   -Blood cultures pending, so far negative   - Follow CBC, CMP, CRP    -Continue Dexamethasone but increase to 10 mg daily for 5 days, then taper  -Continue Remdesivir for 5 days. Currently day 4    Due to worsening respiratory failure IL-6 inhibitor therapy is recommended but patient still refusing.     - Covid isolation for 20 days from symptom onset  - DVT ppx while inpatient.      Complex medical decision making. High risk for further decompensation. I will follow      has obtained the history, performed the physical exam and formulated the above treatment plan.     I have edited this note to reflect my history, exam, assessment and plan.  Dylan Philippe MD    •

## 2021-09-12 NOTE — PROGRESS NOTES
[facility] Medicine Services  PROGRESS NOTE    Patient Name: Flip Yu II  : 1955  MRN: [id number]    Date of Admission: 2021  Primary Care Physician: Nico Theodore MD    Subjective   Subjective     CC:   Shortness of breath and nasal congestion    HPI:   Sleeping a lot.  Says he is doing fine today.  No issues reported.  On high flow nasal cannula at 65%    ROS:    Gen- No fevers, chills  CV- No chest pain, palpitations  Resp- No cough, dyspnea  GI- No N/V/D, abd pain      Objective   Objective     Vital Signs:   Temp:  [98 °F (36.7 °C)-98.4 °F (36.9 °C)] 98.3 °F (36.8 °C)  Heart Rate:  [47-67] 67  Resp:  [16-20] 16  BP: (124-146)/(64-84) 124/70  Flow (L/min):  [45] 45     Physical Exam:    With patient's consent, physical exam was conducted via visual telemedicine encounter due to patient's current isolation requirements in the interest of PPE conservation.    Constitutional: No acute distress, awake, alert, nontoxic, normal body habitus  HENT: NCAT, MMM, no conjunctival injection  Respiratory: Good effort, nonlabored respirations   Cardiovascular:  tele with NSR  Musculoskeletal: No edema, normal muscle tone and mass for age  Skin: No visible rashes      Results Reviewed:  LAB RESULTS:      Lab 21  0542 21  0419 09/10/21  0658 21  0138 21   WBC 8.28 7.90 6.27  --  7.36   HEMOGLOBIN 15.5 14.7 15.0  --  16.5   HEMATOCRIT 46.6 43.5 44.2  --  47.5   PLATELETS 235 203 164  --  139*   NEUTROS ABS 6.05 5.98 4.61  --  5.74   IMMATURE GRANS (ABS) 0.04 0.03 0.04  --  0.03   LYMPHS ABS 1.39 1.17 1.00  --  1.08   MONOS ABS 0.79 0.71 0.61  --  0.49   EOS ABS 0.00 0.00 0.00  --  0.00   MCV 95.1 94.2 94.0  --  92.8   CRP 0.82* 1.54* 3.03* 4.34*  --    PROCALCITONIN  --   --   --   --  0.10   LACTATE  --   --   --   --  1.8   LDH  --   --   --  277*  --    D DIMER QUANT  --   --   --   --  0.57*         Lab 21  0542 21  0420  09/11/21  0419 09/10/21  0658 09/09/21  0138 09/08/21  2005   SODIUM 138  --  140 138  --  135*   POTASSIUM 4.1  --  4.3 3.9  --  3.6   CHLORIDE 102  --  103 101  --  97*   CO2 24.0  --  27.0 26.0  --  23.0   ANION GAP 12.0  --  10.0 11.0  --  15.0   BUN 22  --  25* 24*  --  19   CREATININE 0.75*  --  0.74* 0.65* 0.75* 0.81   GLUCOSE 258*  --  279* 249*  --  227*   CALCIUM 9.0  --  8.8 9.0  --  8.8   HEMOGLOBIN A1C  --  11.20*  --   --   --   --          Lab 09/12/21  0542 09/11/21  0419 09/10/21  0658 09/09/21  0138 09/08/21  2005   TOTAL PROTEIN 6.7 6.3 6.5 6.9 7.2   ALBUMIN 3.40* 3.00* 3.30* 3.50 3.80   GLOBULIN 3.3 3.3 3.2  --  3.4   ALT (SGPT) 21 19 19 23 25   AST (SGOT) 19 17 18 25 30   BILIRUBIN 0.6 0.5 0.4 0.6 0.6   INDIRECT BILIRUBIN  --   --   --  0.4  --    BILIRUBIN DIRECT  --   --   --  0.2  --    ALK PHOS 71 62 62 61 66         Lab 09/08/21 2005   PROBNP 245.4   TROPONIN T <0.010             Lab 09/09/21 0138   FERRITIN 1,738.00*         Brief Urine Lab Results     None          Microbiology Results Abnormal     Procedure Component Value - Date/Time    Blood Culture - Blood, Arm, Right [[id number]] Collected: 09/08/21 2322    Lab Status: Preliminary result Specimen: Blood from Arm, Right Updated: 09/11/21 2345     Blood Culture No growth at 3 days    Blood Culture - Blood, Arm, Left [[id number]] Collected: 09/08/21 2322    Lab Status: Preliminary result Specimen: Blood from Arm, Left Updated: 09/11/21 2345     Blood Culture No growth at 3 days          No radiology results from the last 24 hrs        I have reviewed the medications:  Scheduled Meds:albuterol sulfate HFA, 2 puff, Inhalation, 4x Daily - RT  dexamethasone, 10 mg, Oral, Daily With Breakfast  dextromethorphan polistirex ER, 60 mg, Oral, Q12H  enoxaparin, 40 mg, Subcutaneous, Q24H  insulin detemir, 12 Units, Subcutaneous, Nightly  insulin lispro, 0-14 Units, Subcutaneous, TID AC  insulin lispro, 7 Units, Subcutaneous, TID With  Meals  lisinopril, 20 mg, Oral, Daily  remdesivir, 100 mg, Intravenous, Q24H      Continuous Infusions:Pharmacy Consult - Remdesivir,       PRN Meds:.•  acetaminophen  •  benzonatate  •  dextrose  •  dextrose  •  diphenhydrAMINE  •  glucagon (human recombinant)  •  Pharmacy Consult - Remdesivir    Assessment/Plan   Assessment & Plan     Active Hospital Problems    Diagnosis  POA   • **Pneumonia due to COVID-19 virus [U07.1, J12.82]  Yes   • Hypoxia [R09.02]  Unknown   • Diabetes mellitus, type II (CMS/HCC) [E11.9]  Unknown   • Thrombocytopenia (CMS/HCC) [D69.6]  Unknown   • Essential hypertension [I10]  Yes      Resolved Hospital Problems   No resolved problems to display.        Brief Hospital Course to date:  Flip Yu II is a 66 y.o. male with history of obesity, unvaccinated for COVID-19 who presented to the ER with shortness of breath for 3 days.  He was found to have COVID-19 and hypoxia in the ER.  He was admitted to Hospital Medicine for Acute Hypoxia due to COVID-19.      Acute hypoxic respiratory failure  COVID-19 pneumonia  - bilateral opacities on CTA seen on admission  - currently on high flow Nasal Cannula  - will ask ID to see due to worsening oxygen requirements  - he is unvaccinated  - COVID-19 was detected on arrival in the ER  - supplemental oxygen as needed  - dexamethaxone Day # 5 - dose increased today to 10 mg  - Remdesivir Day # 4    Hypertension  - currently on lisinopril  - steroids could drive pressure up, however  - lower bp  - decreased lisinopril dose yesterday  - Blood pressure perfect today at 124/70    Diabetes  - hemoglobin A1C ~ 11.2  - BG in the 200 + range  - Diabetes educator  - Adjust insulin today  -Increase long-acting insulin today  -Increase mealtime insulin today  -He appears to needed at least 36 units of coverage in the last 24 hours    Obesity  - risk factor in COVID-19 disease    Relatively stable FiO2 on High flow but not really able to wean  ID following    DVT  prophylaxis:  Medical DVT prophylaxis orders are present.            Disposition: I expect the patient to be discharged TBD    CODE STATUS:   Code Status and Medical Interventions:   Ordered at: 09/08/21 1645     Level Of Support Discussed With:    Patient     Code Status:    CPR     Medical Interventions (Level of Support Prior to Arrest):    Full       Zev Mistry MD  09/12/21

## 2021-09-13 LAB
ALBUMIN SERPL-MCNC: 3.1 G/DL (ref 3.5–5.2)
ALBUMIN/GLOB SERPL: 1 G/DL
ALP SERPL-CCNC: 67 U/L (ref 39–117)
ALT SERPL W P-5'-P-CCNC: 21 U/L (ref 1–41)
ANION GAP SERPL CALCULATED.3IONS-SCNC: 8 MMOL/L (ref 5–15)
AST SERPL-CCNC: 20 U/L (ref 1–40)
BACTERIA SPEC AEROBE CULT: NORMAL
BACTERIA SPEC AEROBE CULT: NORMAL
BASOPHILS # BLD AUTO: 0.01 10*3/MM3 (ref 0–0.2)
BASOPHILS NFR BLD AUTO: 0.1 % (ref 0–1.5)
BILIRUB SERPL-MCNC: 0.6 MG/DL (ref 0–1.2)
BUN SERPL-MCNC: 24 MG/DL (ref 8–23)
BUN/CREAT SERPL: 30.4 (ref 7–25)
CALCIUM SPEC-SCNC: 8.9 MG/DL (ref 8.6–10.5)
CHLORIDE SERPL-SCNC: 101 MMOL/L (ref 98–107)
CO2 SERPL-SCNC: 29 MMOL/L (ref 22–29)
CREAT SERPL-MCNC: 0.79 MG/DL (ref 0.76–1.27)
CRP SERPL-MCNC: 0.46 MG/DL (ref 0–0.5)
DEPRECATED RDW RBC AUTO: 39.4 FL (ref 37–54)
EOSINOPHIL # BLD AUTO: 0.01 10*3/MM3 (ref 0–0.4)
EOSINOPHIL NFR BLD AUTO: 0.1 % (ref 0.3–6.2)
ERYTHROCYTE [DISTWIDTH] IN BLOOD BY AUTOMATED COUNT: 11.4 % (ref 12.3–15.4)
GFR SERPL CREATININE-BSD FRML MDRD: 98 ML/MIN/1.73
GLOBULIN UR ELPH-MCNC: 3 GM/DL
GLUCOSE BLDC GLUCOMTR-MCNC: 149 MG/DL (ref 70–130)
GLUCOSE BLDC GLUCOMTR-MCNC: 181 MG/DL (ref 70–130)
GLUCOSE BLDC GLUCOMTR-MCNC: 207 MG/DL (ref 70–130)
GLUCOSE BLDC GLUCOMTR-MCNC: 220 MG/DL (ref 70–130)
GLUCOSE SERPL-MCNC: 252 MG/DL (ref 65–99)
HCT VFR BLD AUTO: 42.9 % (ref 37.5–51)
HGB BLD-MCNC: 14.4 G/DL (ref 13–17.7)
IMM GRANULOCYTES # BLD AUTO: 0.06 10*3/MM3 (ref 0–0.05)
IMM GRANULOCYTES NFR BLD AUTO: 0.6 % (ref 0–0.5)
LYMPHOCYTES # BLD AUTO: 1.88 10*3/MM3 (ref 0.7–3.1)
LYMPHOCYTES NFR BLD AUTO: 20.3 % (ref 19.6–45.3)
MCH RBC QN AUTO: 31.4 PG (ref 26.6–33)
MCHC RBC AUTO-ENTMCNC: 33.6 G/DL (ref 31.5–35.7)
MCV RBC AUTO: 93.7 FL (ref 79–97)
MONOCYTES # BLD AUTO: 0.88 10*3/MM3 (ref 0.1–0.9)
MONOCYTES NFR BLD AUTO: 9.5 % (ref 5–12)
NEUTROPHILS NFR BLD AUTO: 6.43 10*3/MM3 (ref 1.7–7)
NEUTROPHILS NFR BLD AUTO: 69.4 % (ref 42.7–76)
NRBC BLD AUTO-RTO: 0 /100 WBC (ref 0–0.2)
PLATELET # BLD AUTO: 238 10*3/MM3 (ref 140–450)
PMV BLD AUTO: 10.4 FL (ref 6–12)
POTASSIUM SERPL-SCNC: 4.6 MMOL/L (ref 3.5–5.2)
PROT SERPL-MCNC: 6.1 G/DL (ref 6–8.5)
RBC # BLD AUTO: 4.58 10*6/MM3 (ref 4.14–5.8)
SODIUM SERPL-SCNC: 138 MMOL/L (ref 136–145)
WBC # BLD AUTO: 9.27 10*3/MM3 (ref 3.4–10.8)

## 2021-09-13 PROCEDURE — 63710000001 DEXAMETHASONE PER 0.25 MG: Performed by: NURSE PRACTITIONER

## 2021-09-13 PROCEDURE — 25010000002 ENOXAPARIN PER 10 MG: Performed by: NURSE PRACTITIONER

## 2021-09-13 PROCEDURE — 63710000001 INSULIN LISPRO (HUMAN) PER 5 UNITS: Performed by: HOSPITALIST

## 2021-09-13 PROCEDURE — 94799 UNLISTED PULMONARY SVC/PX: CPT

## 2021-09-13 PROCEDURE — 86140 C-REACTIVE PROTEIN: CPT | Performed by: NURSE PRACTITIONER

## 2021-09-13 PROCEDURE — 99232 SBSQ HOSP IP/OBS MODERATE 35: CPT | Performed by: HOSPITALIST

## 2021-09-13 PROCEDURE — 63710000001 INSULIN DETEMIR PER 5 UNITS: Performed by: HOSPITALIST

## 2021-09-13 PROCEDURE — 85025 COMPLETE CBC W/AUTO DIFF WBC: CPT | Performed by: NURSE PRACTITIONER

## 2021-09-13 PROCEDURE — 82962 GLUCOSE BLOOD TEST: CPT

## 2021-09-13 PROCEDURE — 80053 COMPREHEN METABOLIC PANEL: CPT | Performed by: NURSE PRACTITIONER

## 2021-09-13 RX ADMIN — DEXAMETHASONE 10 MG: 4 TABLET ORAL at 08:15

## 2021-09-13 RX ADMIN — ALBUTEROL SULFATE 2 PUFF: 90 AEROSOL, METERED RESPIRATORY (INHALATION) at 12:11

## 2021-09-13 RX ADMIN — INSULIN LISPRO 15 UNITS: 100 INJECTION, SOLUTION INTRAVENOUS; SUBCUTANEOUS at 08:16

## 2021-09-13 RX ADMIN — INSULIN LISPRO 3 UNITS: 100 INJECTION, SOLUTION INTRAVENOUS; SUBCUTANEOUS at 17:24

## 2021-09-13 RX ADMIN — INSULIN LISPRO 15 UNITS: 100 INJECTION, SOLUTION INTRAVENOUS; SUBCUTANEOUS at 17:24

## 2021-09-13 RX ADMIN — BENZONATATE 200 MG: 100 CAPSULE ORAL at 20:36

## 2021-09-13 RX ADMIN — INSULIN LISPRO 15 UNITS: 100 INJECTION, SOLUTION INTRAVENOUS; SUBCUTANEOUS at 12:26

## 2021-09-13 RX ADMIN — ENOXAPARIN SODIUM 40 MG: 40 INJECTION SUBCUTANEOUS at 08:15

## 2021-09-13 RX ADMIN — ALBUTEROL SULFATE 2 PUFF: 90 AEROSOL, METERED RESPIRATORY (INHALATION) at 18:57

## 2021-09-13 RX ADMIN — LISINOPRIL 20 MG: 20 TABLET ORAL at 08:15

## 2021-09-13 RX ADMIN — Medication 60 MG: at 08:15

## 2021-09-13 RX ADMIN — INSULIN LISPRO 5 UNITS: 100 INJECTION, SOLUTION INTRAVENOUS; SUBCUTANEOUS at 08:15

## 2021-09-13 RX ADMIN — REMDESIVIR 100 MG: 100 INJECTION, POWDER, LYOPHILIZED, FOR SOLUTION INTRAVENOUS at 08:16

## 2021-09-13 RX ADMIN — INSULIN DETEMIR 20 UNITS: 100 INJECTION, SOLUTION SUBCUTANEOUS at 20:36

## 2021-09-13 NOTE — CONSULTS
Attempted to call for diabetes ed over the phone due to positive covid dx , unable to reach patient . Will attempt to call tomorrow

## 2021-09-13 NOTE — PLAN OF CARE
Goal Outcome Evaluation:  Plan of Care Reviewed With: patient        Patient remains on 45L at 60% by HFNC.  Patient VSS, dennis on tele, and remained afebrile throughout the shift.  No complaints of pain, discomfort, or SOA reported.  Continue to monitor.

## 2021-09-13 NOTE — CASE MANAGEMENT/SOCIAL WORK
Continued Stay Note  [facility]     Patient Name: Flip Yu II  MRN: [id number]  Today's Date: 9/13/2021    Admit Date: 9/8/2021    Discharge Plan     Row Name 09/13/21 1127       Plan    Plan  Home    Plan Comments  Does not answer his phone. States to MD too many interruptions in hospital. Still on 45L/60% O2 HFNC. Will continue to follow.    Final Discharge Disposition Code  01 - home or self-care        Discharge Codes    No documentation.             Lucia Velez RN

## 2021-09-13 NOTE — PROGRESS NOTES
"INFECTIOUS DISEASE Progress note     Flip Yu II  1955  [id number]    Admission Date: 9/8/2021    Requesting Provider: Fede  Evaluating Physician: Jose Martin    Reason for Consultation: COVID 19 pneumonia     History of present illness:    Patient is a 66 y.o. male, unvaccinated, PMH HTN, seen today for COVID 19 pneumonia. He presented to the ED with complaints of nasal congestion, cough over the past several weeks.  Oxygen saturation of 89% ini the ED.  CXR with subtle faint bilateral infiltrates, CTA chest with multifocal parenchymal opacities compatible with multifocal pneumonia. Admitting labs with WBC 7.36, PCT 0.10, CRP 4.34, ferritin 1738,  And he has been afebrile.   He is now on 45L70% FIO2 HFNC.  He was started on Remdesivir and Dexamethasone and we were consulted for evaluated and treatment. Feels \"like crap\"    9/12/21: he remains afebrile.  He remains on HFNC 45L/65% FIO2.  CRP improved. Feeling better today.     9/13/21: Afebrile.  Relatively comfortable.  Complains about being unable to get much sleep in the hospital due to interruptions.  Still has some cough. 60% FIO2.     ROS:  No fevers or chills. No n/v/d. No rash. No new ADR to Abx.       No Known Allergies      Medication:    Current Facility-Administered Medications:   •  acetaminophen (TYLENOL) tablet 650 mg, 650 mg, Oral, Q6H PRN, Marcial, Sindy G, DO  •  albuterol sulfate HFA (PROVENTIL HFA;VENTOLIN HFA;PROAIR HFA) inhaler 2 puff, 2 puff, Inhalation, 4x Daily - RT, Marcial, Sindy G, DO, 2 puff at 09/12/21 2038  •  benzonatate (TESSALON) capsule 200 mg, 200 mg, Oral, TID PRN, Marcial, Sindy G, DO, 200 mg at 09/09/21 0332  •  dexamethasone (DECADRON) tablet 10 mg, 10 mg, Oral, Daily With Breakfast, Apolonia Treviño, APRN, 10 mg at 09/13/21 0815  •  dextromethorphan polistirex ER (DELSYM) 30 MG/5ML oral suspension 60 mg, 60 mg, Oral, Q12H, Sindy Ceja, DO, 60 mg at 09/13/21 0815  •  dextrose (D50W) 25 g/ 50mL Intravenous " Solution 25 g, 25 g, Intravenous, Q15 Min PRN, Zev Mistry MD  •  dextrose (GLUTOSE) oral gel 15 g, 15 g, Oral, Q15 Min PRN, Zev Mistry MD  •  diphenhydrAMINE (BENADRYL) capsule 25 mg, 25 mg, Oral, Nightly PRN, Zev Mistry MD, 25 mg at 21  •  enoxaparin (LOVENOX) syringe 40 mg, 40 mg, Subcutaneous, Q24H, Judy Diaz APRN, 40 mg at 21 0815  •  glucagon (human recombinant) (GLUCAGEN DIAGNOSTIC) injection 1 mg, 1 mg, Subcutaneous, Q15 Min PRN, Zev Mistry MD  •  insulin detemir (LEVEMIR) injection 20 Units, 20 Units, Subcutaneous, Nightly, Zev Mistry MD, 20 Units at 218  •  insulin lispro (humaLOG) injection 0-14 Units, 0-14 Units, Subcutaneous, TID AC, Zev Mistry MD, 5 Units at 21 0815  •  insulin lispro (humaLOG) injection 15 Units, 15 Units, Subcutaneous, TID With Meals, Zev Mistry MD, 15 Units at 21 0816  •  lisinopril (PRINIVIL,ZESTRIL) tablet 20 mg, 20 mg, Oral, Daily, Zev Mistry MD, 20 mg at 21 0815  •  Pharmacy Consult - Remdesivir, , Does not apply, Continuous PRN, Judy Diaz APRN    Antibiotics:  Anti-Infectives (From admission, onward)    Ordered     Dose/Rate Route Frequency Start Stop    21 0022  remdesivir 100 mg in sodium chloride 0.9 % 270 mL IVPB (powder vial)     Ordering Provider: Yajaira Diaza, APRN    100 mg  over 60 Minutes Intravenous Every 24 Hours 09/10/21 0900 21 0916    21 0022  remdesivir 200 mg in sodium chloride 0.9 % 290 mL IVPB (powder vial)     Ordering Provider: Gennaro Diazatha, APRN    200 mg  over 60 Minutes Intravenous Every 24 Hours 21 0230 21 0257           Physical Exam:   Vital Signs  Temp (24hrs), Av.1 °F (36.7 °C), Min:97.7 °F (36.5 °C), Max:98.3 °F (36.8 °C)    Temp  Min: 97.7 °F (36.5 °C)  Max: 98.3 °F (36.8 °C)  BP  Min: 124/70  Max: 148/77  Pulse  Min: 47  Max: 67  Resp  Min: 16  Max: 20  SpO2  Min: 90 %  Max: 98 %    Due to the COVID-19  pandemic, and in an effort to preserve PPE, I did not directly examine the patient but directly visualized him through the window and spoke to him via telephone.    GENERAL: Awake and alert, comfortable   HEENT: Normocephalic, atraumatic   HEART: RRR; No murmur,    LUNGS: normal respiratory effort. On HFNC. + dry cough noted  ABDOMEN: , nondistended. Non-tender  EXT:  No edema.   :  Without Rivas catheter.  SKIN: no rash on visible skin  NEURO: Oriented to PPT..  PSYCHIATRIC: Normal insight and judgement. Cooperative with PE    Laboratory Data    Results from last 7 days   Lab Units 09/13/21  0525 09/12/21  0542 09/11/21  0419   WBC 10*3/mm3 9.27 8.28 7.90   HEMOGLOBIN g/dL 14.4 15.5 14.7   HEMATOCRIT % 42.9 46.6 43.5   PLATELETS 10*3/mm3 238 235 203     Results from last 7 days   Lab Units 09/13/21  0525   SODIUM mmol/L 138   POTASSIUM mmol/L 4.6   CHLORIDE mmol/L 101   CO2 mmol/L 29.0   BUN mg/dL 24*   CREATININE mg/dL 0.79   GLUCOSE mg/dL 252*   CALCIUM mg/dL 8.9     Results from last 7 days   Lab Units 09/13/21  0525 09/10/21  0658 09/09/21  0138   ALK PHOS U/L 67   < > 61   BILIRUBIN mg/dL 0.6   < > 0.6   BILIRUBIN DIRECT mg/dL  --   --  0.2   ALT (SGPT) U/L 21   < > 23   AST (SGOT) U/L 20   < > 25    < > = values in this interval not displayed.         Results from last 7 days   Lab Units 09/13/21  0525   CRP mg/dL 0.46     Results from last 7 days   Lab Units 09/08/21 2005   LACTATE mmol/L 1.8             Estimated Creatinine Clearance: 112.1 mL/min (by C-G formula based on SCr of 0.79 mg/dL).      Microbiology:  9/8 blood cx pending, so far neg      Radiology:  Imaging Results (Last 72 Hours)     ** No results found for the last 72 hours. **           Impression:   - Severe COVID 19 pneumonia, with multifocal parenchymal opacities by CTA chest, CRP 4, now improved   - Acute hypoxic respiratory failure, now on HFNC: Negative procalcitonin. Unlikely bacterial coinfection. CTA negative for PE  - HTN  -  poorly controlled DM2: A1c 11.2    PLAN/RECOMMENDATIONS:   -Blood cultures pending, so far negative   - Follow CBC, CMP     -Continue Dexamethasone but increase to 10 mg daily for 5 days, then taper. Currently day 3 of higher dose.   - Final day of remdesivir today  - Pt refused toculizumab  - Covid isolation for 20 days from symptom onset  - DVT ppx while inpatient.      I will follow    Dylan Philippe MD

## 2021-09-13 NOTE — PROGRESS NOTES
[facility] Medicine Services  PROGRESS NOTE    Patient Name: Flip Yu II  : 1955  MRN: [id number]    Date of Admission: 2021  Primary Care Physician: Nico Theodore MD    Subjective   Subjective     CC:   Shortness of breath and nasal congestion    HPI:   Says he is not sleeping and wants to lie down for sleep.  He is on high flow.  He denies fevers or chills.  He denies shortness of breath.      ROS:    Gen- No fevers, chills  CV- No chest pain, palpitations  Resp- No cough, dyspnea  GI- No N/V/D, abd pain      Objective   Objective     Vital Signs:   Temp:  [97.7 °F (36.5 °C)-98.6 °F (37 °C)] 98.6 °F (37 °C)  Heart Rate:  [47-66] 66  Resp:  [18-20] 18  BP: (121-148)/(72-79) 123/79  Flow (L/min):  [40-45] 40     Physical Exam:    With patient's consent, physical exam was conducted via visual telemedicine encounter due to patient's current isolation requirements in the interest of PPE conservation.    Constitutional: No acute distress, awake, alert, nontoxic, normal body habitus  HENT: NCAT, MMM, no conjunctival injection  Respiratory: Good effort, nonlabored respirations   Cardiovascular:  tele with NSR  Musculoskeletal: No edema, normal muscle tone and mass for age  Skin: No visible rashes      Results Reviewed:  LAB RESULTS:      Lab 21  0525 21  0542 21  0419 09/10/21  0658 21  0138 21   WBC 9.27 8.28 7.90 6.27  --  7.36   HEMOGLOBIN 14.4 15.5 14.7 15.0  --  16.5   HEMATOCRIT 42.9 46.6 43.5 44.2  --  47.5   PLATELETS 238 235 203 164  --  139*   NEUTROS ABS 6.43 6.05 5.98 4.61  --  5.74   IMMATURE GRANS (ABS) 0.06* 0.04 0.03 0.04  --  0.03   LYMPHS ABS 1.88 1.39 1.17 1.00  --  1.08   MONOS ABS 0.88 0.79 0.71 0.61  --  0.49   EOS ABS 0.01 0.00 0.00 0.00  --  0.00   MCV 93.7 95.1 94.2 94.0  --  92.8   CRP 0.46 0.82* 1.54* 3.03* 4.34*  --    PROCALCITONIN  --   --   --   --   --  0.10   LACTATE  --   --   --   --   --  1.8   LDH   --   --   --   --  277*  --    D DIMER QUANT  --   --   --   --   --  0.57*         Lab 09/13/21  0525 09/12/21  0542 09/11/21  0420 09/11/21  0419 09/10/21  0658 09/09/21  0138 09/08/21  2005 09/08/21  2005   SODIUM 138 138  --  140 138  --   --  135*   POTASSIUM 4.6 4.1  --  4.3 3.9  --   --  3.6   CHLORIDE 101 102  --  103 101  --   --  97*   CO2 29.0 24.0  --  27.0 26.0  --   --  23.0   ANION GAP 8.0 12.0  --  10.0 11.0  --   --  15.0   BUN 24* 22  --  25* 24*  --   --  19   CREATININE 0.79 0.75*  --  0.74* 0.65* 0.75*   < > 0.81   GLUCOSE 252* 258*  --  279* 249*  --   --  227*   CALCIUM 8.9 9.0  --  8.8 9.0  --   --  8.8   HEMOGLOBIN A1C  --   --  11.20*  --   --   --   --   --     < > = values in this interval not displayed.         Lab 09/13/21  0525 09/12/21  0542 09/11/21  0419 09/10/21  0658 09/09/21  0138 09/08/21  2005 09/08/21  2005   TOTAL PROTEIN 6.1 6.7 6.3 6.5 6.9   < > 7.2   ALBUMIN 3.10* 3.40* 3.00* 3.30* 3.50   < > 3.80   GLOBULIN 3.0 3.3 3.3 3.2  --   --  3.4   ALT (SGPT) 21 21 19 19 23   < > 25   AST (SGOT) 20 19 17 18 25   < > 30   BILIRUBIN 0.6 0.6 0.5 0.4 0.6   < > 0.6   INDIRECT BILIRUBIN  --   --   --   --  0.4  --   --    BILIRUBIN DIRECT  --   --   --   --  0.2  --   --    ALK PHOS 67 71 62 62 61   < > 66    < > = values in this interval not displayed.         Lab 09/08/21 2005   PROBNP 245.4   TROPONIN T <0.010             Lab 09/09/21  0138   FERRITIN 1,738.00*         Brief Urine Lab Results     None          Microbiology Results Abnormal     Procedure Component Value - Date/Time    Blood Culture - Blood, Arm, Right [[id number]] Collected: 09/08/21 2322    Lab Status: Preliminary result Specimen: Blood from Arm, Right Updated: 09/12/21 2345     Blood Culture No growth at 4 days    Blood Culture - Blood, Arm, Left [[id number]] Collected: 09/08/21 2322    Lab Status: Preliminary result Specimen: Blood from Arm, Left Updated: 09/12/21 2345     Blood Culture No growth at 4 days           No radiology results from the last 24 hrs        I have reviewed the medications:  Scheduled Meds:albuterol sulfate HFA, 2 puff, Inhalation, 4x Daily - RT  dexamethasone, 10 mg, Oral, Daily With Breakfast  dextromethorphan polistirex ER, 60 mg, Oral, Q12H  enoxaparin, 40 mg, Subcutaneous, Q24H  insulin detemir, 20 Units, Subcutaneous, Nightly  insulin lispro, 0-14 Units, Subcutaneous, TID AC  insulin lispro, 15 Units, Subcutaneous, TID With Meals  lisinopril, 20 mg, Oral, Daily      Continuous Infusions:Pharmacy Consult - Remdesivir,       PRN Meds:.•  acetaminophen  •  benzonatate  •  dextrose  •  dextrose  •  diphenhydrAMINE  •  glucagon (human recombinant)  •  Pharmacy Consult - Remdesivir    Assessment/Plan   Assessment & Plan     Active Hospital Problems    Diagnosis  POA   • **Pneumonia due to COVID-19 virus [U07.1, J12.82]  Yes   • Hypoxia [R09.02]  Unknown   • Diabetes mellitus, type II (CMS/HCC) [E11.9]  Unknown   • Thrombocytopenia (CMS/HCC) [D69.6]  Unknown   • Essential hypertension [I10]  Yes      Resolved Hospital Problems   No resolved problems to display.        Brief Hospital Course to date:  Flip Yu II is a 66 y.o. male with history of obesity, unvaccinated for COVID-19 who presented to the ER with shortness of breath for 3 days.  He was found to have COVID-19 and hypoxia in the ER.  He was admitted to Hospital Medicine for Acute Hypoxia due to COVID-19.      Acute hypoxic respiratory failure  COVID-19 pneumonia  - bilateral opacities on CTA seen on admission  - currently on high flow Nasal Cannula  - will ask ID to see due to worsening oxygen requirements  - he is unvaccinated  - COVID-19 was detected on arrival in the ER  - supplemental oxygen as needed  - dexamethaxone Day # 6 - dose increased to 10 mg  - Remdesivir Day # 5  - High Flow Nasal cannula weaned to 45%     Hypertension  - currently on lisinopril  - steroids could drive pressure up, however  - lower bp  - decreased  lisinopril dose yesterday  - Blood pressure perfect today at 124/70    Diabetes  - hemoglobin A1C ~ 11.2  - BG in the 200 + range  - Diabetes educator  - Adjust insulin today  -Increase long-acting insulin today  -Increase mealtime insulin today  -He appears to needed at least 36 units of coverage in the last 24 hours    Obesity  - risk factor in COVID-19 disease    Relatively stable FiO2 on High flow but not really able to wean easily  Moved from 60% to 45% today  ID following    DVT prophylaxis:  Medical DVT prophylaxis orders are present.            Disposition: I expect the patient to be discharged TBD    CODE STATUS:   Code Status and Medical Interventions:   Ordered at: 09/08/21 9346     Level Of Support Discussed With:    Patient     Code Status:    CPR     Medical Interventions (Level of Support Prior to Arrest):    Full       Zev Mistry MD  09/13/21

## 2021-09-14 LAB
GLUCOSE BLDC GLUCOMTR-MCNC: 201 MG/DL (ref 70–130)
GLUCOSE BLDC GLUCOMTR-MCNC: 241 MG/DL (ref 70–130)
GLUCOSE BLDC GLUCOMTR-MCNC: 292 MG/DL (ref 70–130)
GLUCOSE BLDC GLUCOMTR-MCNC: 313 MG/DL (ref 70–130)

## 2021-09-14 PROCEDURE — 63710000001 DIPHENHYDRAMINE PER 50 MG: Performed by: HOSPITALIST

## 2021-09-14 PROCEDURE — 94799 UNLISTED PULMONARY SVC/PX: CPT

## 2021-09-14 PROCEDURE — 63710000001 DEXAMETHASONE PER 0.25 MG: Performed by: NURSE PRACTITIONER

## 2021-09-14 PROCEDURE — 63710000001 INSULIN LISPRO (HUMAN) PER 5 UNITS: Performed by: HOSPITALIST

## 2021-09-14 PROCEDURE — 25010000002 ENOXAPARIN PER 10 MG: Performed by: NURSE PRACTITIONER

## 2021-09-14 PROCEDURE — 99232 SBSQ HOSP IP/OBS MODERATE 35: CPT | Performed by: HOSPITALIST

## 2021-09-14 PROCEDURE — 63710000001 INSULIN DETEMIR PER 5 UNITS: Performed by: HOSPITALIST

## 2021-09-14 PROCEDURE — 82962 GLUCOSE BLOOD TEST: CPT

## 2021-09-14 RX ADMIN — INSULIN LISPRO 15 UNITS: 100 INJECTION, SOLUTION INTRAVENOUS; SUBCUTANEOUS at 08:45

## 2021-09-14 RX ADMIN — LISINOPRIL 20 MG: 20 TABLET ORAL at 08:45

## 2021-09-14 RX ADMIN — ALBUTEROL SULFATE 2 PUFF: 90 AEROSOL, METERED RESPIRATORY (INHALATION) at 08:21

## 2021-09-14 RX ADMIN — INSULIN LISPRO 15 UNITS: 100 INJECTION, SOLUTION INTRAVENOUS; SUBCUTANEOUS at 17:35

## 2021-09-14 RX ADMIN — ALBUTEROL SULFATE 2 PUFF: 90 AEROSOL, METERED RESPIRATORY (INHALATION) at 12:35

## 2021-09-14 RX ADMIN — INSULIN LISPRO 10 UNITS: 100 INJECTION, SOLUTION INTRAVENOUS; SUBCUTANEOUS at 17:35

## 2021-09-14 RX ADMIN — ALBUTEROL SULFATE 2 PUFF: 90 AEROSOL, METERED RESPIRATORY (INHALATION) at 18:55

## 2021-09-14 RX ADMIN — ALBUTEROL SULFATE 2 PUFF: 90 AEROSOL, METERED RESPIRATORY (INHALATION) at 16:40

## 2021-09-14 RX ADMIN — Medication 60 MG: at 08:45

## 2021-09-14 RX ADMIN — INSULIN DETEMIR 20 UNITS: 100 INJECTION, SOLUTION SUBCUTANEOUS at 22:13

## 2021-09-14 RX ADMIN — INSULIN LISPRO 15 UNITS: 100 INJECTION, SOLUTION INTRAVENOUS; SUBCUTANEOUS at 12:27

## 2021-09-14 RX ADMIN — ENOXAPARIN SODIUM 40 MG: 40 INJECTION SUBCUTANEOUS at 08:45

## 2021-09-14 RX ADMIN — DIPHENHYDRAMINE HYDROCHLORIDE 25 MG: 25 CAPSULE ORAL at 22:01

## 2021-09-14 RX ADMIN — INSULIN LISPRO 5 UNITS: 100 INJECTION, SOLUTION INTRAVENOUS; SUBCUTANEOUS at 12:28

## 2021-09-14 RX ADMIN — DEXAMETHASONE 10 MG: 4 TABLET ORAL at 08:44

## 2021-09-14 RX ADMIN — INSULIN LISPRO 5 UNITS: 100 INJECTION, SOLUTION INTRAVENOUS; SUBCUTANEOUS at 08:45

## 2021-09-14 NOTE — CASE MANAGEMENT/SOCIAL WORK
Continued Stay Note  [facility]     Patient Name: Flip Yu II  MRN: [id number]  Today's Date: 9/14/2021    Admit Date: 9/8/2021    Discharge Plan     Row Name 09/14/21 1124       Plan    Plan  Home    Patient/Family in Agreement with Plan  yes    Plan Comments  Plan remains home at [place]. Still on 6L O2 [place]. Difficult to communicate with as he chooses not to answer his phone. Will continue to follow.    Final Discharge Disposition Code  01 - home or self-care        Discharge Codes    No documentation.             Lucia Velez RN

## 2021-09-14 NOTE — CONSULTS
Attempted to call patient for diabetes education, unable to reach patient. Should patient desire diabetes education in the future, please call [phone] and we will be happy to help. [facility] for opportunity for education.

## 2021-09-15 LAB
GLUCOSE BLDC GLUCOMTR-MCNC: 214 MG/DL (ref 70–130)
GLUCOSE BLDC GLUCOMTR-MCNC: 224 MG/DL (ref 70–130)
GLUCOSE BLDC GLUCOMTR-MCNC: 253 MG/DL (ref 70–130)
GLUCOSE BLDC GLUCOMTR-MCNC: 262 MG/DL (ref 70–130)

## 2021-09-15 PROCEDURE — 94799 UNLISTED PULMONARY SVC/PX: CPT

## 2021-09-15 PROCEDURE — 25010000002 ENOXAPARIN PER 10 MG: Performed by: NURSE PRACTITIONER

## 2021-09-15 PROCEDURE — 99233 SBSQ HOSP IP/OBS HIGH 50: CPT | Performed by: HOSPITALIST

## 2021-09-15 PROCEDURE — 63710000001 INSULIN DETEMIR PER 5 UNITS: Performed by: HOSPITALIST

## 2021-09-15 PROCEDURE — 82962 GLUCOSE BLOOD TEST: CPT

## 2021-09-15 PROCEDURE — 63710000001 INSULIN LISPRO (HUMAN) PER 5 UNITS: Performed by: HOSPITALIST

## 2021-09-15 PROCEDURE — 63710000001 DEXAMETHASONE PER 0.25 MG: Performed by: NURSE PRACTITIONER

## 2021-09-15 RX ADMIN — INSULIN LISPRO 30 UNITS: 100 INJECTION, SOLUTION INTRAVENOUS; SUBCUTANEOUS at 17:34

## 2021-09-15 RX ADMIN — ALBUTEROL SULFATE 2 PUFF: 90 AEROSOL, METERED RESPIRATORY (INHALATION) at 19:14

## 2021-09-15 RX ADMIN — INSULIN LISPRO 20 UNITS: 100 INJECTION, SOLUTION INTRAVENOUS; SUBCUTANEOUS at 08:51

## 2021-09-15 RX ADMIN — INSULIN LISPRO 5 UNITS: 100 INJECTION, SOLUTION INTRAVENOUS; SUBCUTANEOUS at 08:51

## 2021-09-15 RX ADMIN — ALBUTEROL SULFATE 2 PUFF: 90 AEROSOL, METERED RESPIRATORY (INHALATION) at 12:03

## 2021-09-15 RX ADMIN — INSULIN LISPRO 20 UNITS: 100 INJECTION, SOLUTION INTRAVENOUS; SUBCUTANEOUS at 12:12

## 2021-09-15 RX ADMIN — ALBUTEROL SULFATE 2 PUFF: 90 AEROSOL, METERED RESPIRATORY (INHALATION) at 08:55

## 2021-09-15 RX ADMIN — ALBUTEROL SULFATE 2 PUFF: 90 AEROSOL, METERED RESPIRATORY (INHALATION) at 16:12

## 2021-09-15 RX ADMIN — INSULIN LISPRO 8 UNITS: 100 INJECTION, SOLUTION INTRAVENOUS; SUBCUTANEOUS at 17:35

## 2021-09-15 RX ADMIN — INSULIN DETEMIR 30 UNITS: 100 INJECTION, SOLUTION SUBCUTANEOUS at 21:30

## 2021-09-15 RX ADMIN — DEXAMETHASONE 10 MG: 4 TABLET ORAL at 08:50

## 2021-09-15 RX ADMIN — INSULIN LISPRO 30 UNITS: 100 INJECTION, SOLUTION INTRAVENOUS; SUBCUTANEOUS at 17:35

## 2021-09-15 RX ADMIN — LISINOPRIL 20 MG: 20 TABLET ORAL at 08:51

## 2021-09-15 RX ADMIN — INSULIN LISPRO 5 UNITS: 100 INJECTION, SOLUTION INTRAVENOUS; SUBCUTANEOUS at 12:12

## 2021-09-15 RX ADMIN — ENOXAPARIN SODIUM 40 MG: 40 INJECTION SUBCUTANEOUS at 08:51

## 2021-09-15 NOTE — PLAN OF CARE
Goal Outcome Evaluation:  Plan of Care Reviewed With: patient        Progress: no change  Outcome Summary: VSS. 5LNC. Denies SOA and pain. Airborne and contact precautions maintained.

## 2021-09-15 NOTE — CONSULTS
Mrs. Yu returned my call. She was able to provide the name of the patient's home medications. Prior to admission he was taking Glipizide, Tradjenta, Metformin. She stated he was not checking his blood sugar at home. She was not aware of his high blood sugar. We discussed the significance of his A1c and importance of blood sugar control to reduce complications. All of her questions answered at this time.   [phone]

## 2021-09-15 NOTE — CASE MANAGEMENT/SOCIAL WORK
Continued Stay Note  [facility]     Patient Name: Flip Yu II  MRN: [id number]  Today's Date: 9/15/2021    Admit Date: 9/8/2021    Discharge Plan     Row Name 09/15/21 1200       Plan    Plan  Home    Patient/Family in Agreement with Plan  yes    Plan Comments  Does not answer his phone. States to MD too many interruptions in hospital. Now on O2 at 5L NC. Having increased FSBS.    Final Discharge Disposition Code  01 - home or self-care        Discharge Codes    No documentation.             Lucia Velez RN

## 2021-09-15 NOTE — PROGRESS NOTES
[facility] Medicine Services  PROGRESS NOTE    Patient Name: Flip Yu II  : 1955  MRN: [id number]    Date of Admission: 2021  Primary Care Physician: Nico Theodore MD    Subjective   Subjective     CC:   Shortness of breath and nasal congestion    HPI:   Off high flow onto 6 L today.  Patient says he is breathing better.  Encouraged him to do breathing exercises today with Incentive Spirometer in an effort to improve his ability to wean FiO2    ROS:    Gen- No fevers, chills  CV- No chest pain, palpitations  Resp- No cough, dyspnea  GI- No N/V/D, abd pain      Objective   Objective     Vital Signs:   Temp:  [98 °F (36.7 °C)-98.2 °F (36.8 °C)] 98.2 °F (36.8 °C)  Heart Rate:  [49-88] 88  Resp:  [18-22] 20  BP: (113-155)/(71-87) 113/71  Flow (L/min):  [4.5-40] 5     Physical Exam:    With patient's consent, physical exam was conducted via visual telemedicine encounter due to patient's current isolation requirements in the interest of PPE conservation.    Constitutional: No acute distress, awake, alert, nontoxic, normal body habitus  HENT: NCAT, MMM, no conjunctival injection  Respiratory: Good effort, nonlabored respirations   Cardiovascular:  tele with NSR  Musculoskeletal: No edema, normal muscle tone and mass for age  Skin: No visible rashes      Results Reviewed:  LAB RESULTS:      Lab 21  0525 21  0542 21  0419 09/10/21  0658 21  0138 21   WBC 9.27 8.28 7.90 6.27  --  7.36   HEMOGLOBIN 14.4 15.5 14.7 15.0  --  16.5   HEMATOCRIT 42.9 46.6 43.5 44.2  --  47.5   PLATELETS 238 235 203 164  --  139*   NEUTROS ABS 6.43 6.05 5.98 4.61  --  5.74   IMMATURE GRANS (ABS) 0.06* 0.04 0.03 0.04  --  0.03   LYMPHS ABS 1.88 1.39 1.17 1.00  --  1.08   MONOS ABS 0.88 0.79 0.71 0.61  --  0.49   EOS ABS 0.01 0.00 0.00 0.00  --  0.00   MCV 93.7 95.1 94.2 94.0  --  92.8   CRP 0.46 0.82* 1.54* 3.03* 4.34*  --    PROCALCITONIN  --   --   --   --   --   0.10   LACTATE  --   --   --   --   --  1.8   LDH  --   --   --   --  277*  --    D DIMER QUANT  --   --   --   --   --  0.57*         Lab 09/13/21  0525 09/12/21  0542 09/11/21  0420 09/11/21  0419 09/10/21  0658 09/09/21  0138 09/08/21  2005 09/08/21  2005   SODIUM 138 138  --  140 138  --   --  135*   POTASSIUM 4.6 4.1  --  4.3 3.9  --   --  3.6   CHLORIDE 101 102  --  103 101  --   --  97*   CO2 29.0 24.0  --  27.0 26.0  --   --  23.0   ANION GAP 8.0 12.0  --  10.0 11.0  --   --  15.0   BUN 24* 22  --  25* 24*  --   --  19   CREATININE 0.79 0.75*  --  0.74* 0.65* 0.75*   < > 0.81   GLUCOSE 252* 258*  --  279* 249*  --   --  227*   CALCIUM 8.9 9.0  --  8.8 9.0  --   --  8.8   HEMOGLOBIN A1C  --   --  11.20*  --   --   --   --   --     < > = values in this interval not displayed.         Lab 09/13/21  0525 09/12/21  0542 09/11/21  0419 09/10/21  0658 09/09/21  0138 09/08/21  2005 09/08/21  2005   TOTAL PROTEIN 6.1 6.7 6.3 6.5 6.9   < > 7.2   ALBUMIN 3.10* 3.40* 3.00* 3.30* 3.50   < > 3.80   GLOBULIN 3.0 3.3 3.3 3.2  --   --  3.4   ALT (SGPT) 21 21 19 19 23   < > 25   AST (SGOT) 20 19 17 18 25   < > 30   BILIRUBIN 0.6 0.6 0.5 0.4 0.6   < > 0.6   INDIRECT BILIRUBIN  --   --   --   --  0.4  --   --    BILIRUBIN DIRECT  --   --   --   --  0.2  --   --    ALK PHOS 67 71 62 62 61   < > 66    < > = values in this interval not displayed.         Lab 09/08/21 2005   PROBNP 245.4   TROPONIN T <0.010             Lab 09/09/21  0138   FERRITIN 1,738.00*         Brief Urine Lab Results     None          Microbiology Results Abnormal     Procedure Component Value - Date/Time    Blood Culture - Blood, Arm, Left [[id number]] Collected: 09/08/21 2322    Lab Status: Final result Specimen: Blood from Arm, Left Updated: 09/13/21 2345     Blood Culture No growth at 5 days    Blood Culture - Blood, Arm, Right [[id number]] Collected: 09/08/21 2322    Lab Status: Final result Specimen: Blood from Arm, Right Updated: 09/13/21 2345     Blood  Culture No growth at 5 days          No radiology results from the last 24 hrs        I have reviewed the medications:  Scheduled Meds:albuterol sulfate HFA, 2 puff, Inhalation, 4x Daily - RT  dexamethasone, 10 mg, Oral, Daily With Breakfast  dextromethorphan polistirex ER, 60 mg, Oral, Q12H  enoxaparin, 40 mg, Subcutaneous, Q24H  insulin detemir, 20 Units, Subcutaneous, Nightly  insulin lispro, 0-14 Units, Subcutaneous, TID AC  insulin lispro, 15 Units, Subcutaneous, TID With Meals  lisinopril, 20 mg, Oral, Daily      Continuous Infusions:   PRN Meds:.•  acetaminophen  •  benzonatate  •  dextrose  •  dextrose  •  diphenhydrAMINE  •  glucagon (human recombinant)    Assessment/Plan   Assessment & Plan     Active Hospital Problems    Diagnosis  POA   • **Pneumonia due to COVID-19 virus [U07.1, J12.82]  Yes   • Hypoxia [R09.02]  Unknown   • Diabetes mellitus, type II (CMS/HCC) [E11.9]  Unknown   • Thrombocytopenia (CMS/HCC) [D69.6]  Unknown   • Essential hypertension [I10]  Yes      Resolved Hospital Problems   No resolved problems to display.        Brief Hospital Course to date:  Flip Yu II is a 66 y.o. male with history of obesity, unvaccinated for COVID-19 who presented to the ER with shortness of breath for 3 days.  He was found to have COVID-19 and hypoxia in the ER.  He was admitted to Hospital Medicine for Acute Hypoxia due to COVID-19.      Acute hypoxic respiratory failure  COVID-19 pneumonia  - bilateral opacities on CTA seen on admission  - currently on high flow Nasal Cannula  - will ask ID to see due to worsening oxygen requirements  - he is unvaccinated  - COVID-19 was detected on arrival in the ER  - supplemental oxygen as needed  - dexamethaxone Day # 7 - dose increased to 10 mg  - diving sugars up  - Remdesivir completed a 5 day course  - High Flow Nasal cannula weaned to low flow nasal cannula 5 L    Hypertension  - currently on lisinopril  - steroids could drive pressure up, however  - lower  bp  - decreased lisinopril dose  - blood pressure well controlled    Uncontrolled Diabetes  - hemoglobin A1C ~ 11.2  - BG in the 200 + range  - Diabetes educator  - 53 units of coverage needed in last 24 hours  -Increase long-acting insulin to 30 units at bedtime  -Increase mealtime insulin to 20 units with meals    Obesity  - risk factor in COVID-19 disease    Adjust long acting and mealtime insulin today  Off high flow  Encouraged IS exercises and explained rationale on why they are important    DVT prophylaxis:  Medical DVT prophylaxis orders are present.            Disposition: I expect the patient to be discharged TBD    CODE STATUS:   Code Status and Medical Interventions:   Ordered at: 09/08/21 0162     Level Of Support Discussed With:    Patient     Code Status:    CPR     Medical Interventions (Level of Support Prior to Arrest):    Full       Zev Mistry MD  09/14/21

## 2021-09-15 NOTE — PROGRESS NOTES
"    [facility] Medicine Services  PROGRESS NOTE    Patient Name: Flip Yu II  : 1955  MRN: [id number]    Date of Admission: 2021  Primary Care Physician: Nico Theodore MD    Subjective   Subjective     CC:   Shortness of breath and nasal congestion    HPI:   I talked to him today about possibly having to send him home on oxygen.  He says \"I'd rather not go home on oxygen\".  He is making very slow progress.  Currently on about 5 L.  No fever or chills.  No cough reported.  No overnight issues reported during MDR.  BG over 200 today    ROS:    Gen- No fevers, chills  CV- No chest pain, palpitations  Resp- No cough, dyspnea  GI- No N/V/D, abd pain      Objective   Objective     Vital Signs:   Temp:  [98 °F (36.7 °C)-98.3 °F (36.8 °C)] 98.3 °F (36.8 °C)  Heart Rate:  [] 67  Resp:  [18-21] 18  BP: (113-157)/(71-83) 120/72  Flow (L/min):  [4.5-5] 5     Physical Exam:    With patient's consent, physical exam was conducted via visual telemedicine encounter due to patient's current isolation requirements in the interest of PPE conservation.    Constitutional: No acute distress, awake, alert, nontoxic, normal body habitus  HENT: NCAT, MMM, on oxygen by NC - 5 L / min  Respiratory: Good effort, nonlabored respirations   Cardiovascular:  tele with NSR  Musculoskeletal: No edema, normal muscle tone and mass for age  Skin: No visible rashes      Results Reviewed:  LAB RESULTS:      Lab 21  0525 21  0542 21  0419 09/10/21  0658 21  0138 21   WBC 9.27 8.28 7.90 6.27  --  7.36   HEMOGLOBIN 14.4 15.5 14.7 15.0  --  16.5   HEMATOCRIT 42.9 46.6 43.5 44.2  --  47.5   PLATELETS 238 235 203 164  --  139*   NEUTROS ABS 6.43 6.05 5.98 4.61  --  5.74   IMMATURE GRANS (ABS) 0.06* 0.04 0.03 0.04  --  0.03   LYMPHS ABS 1.88 1.39 1.17 1.00  --  1.08   MONOS ABS 0.88 0.79 0.71 0.61  --  0.49   EOS ABS 0.01 0.00 0.00 0.00  --  0.00   MCV 93.7 95.1 94.2 94.0  " --  92.8   CRP 0.46 0.82* 1.54* 3.03* 4.34*  --    PROCALCITONIN  --   --   --   --   --  0.10   LACTATE  --   --   --   --   --  1.8   LDH  --   --   --   --  277*  --    D DIMER QUANT  --   --   --   --   --  0.57*         Lab 09/13/21  0525 09/12/21  0542 09/11/21  0420 09/11/21  0419 09/10/21  0658 09/09/21  0138 09/08/21  2005 09/08/21  2005   SODIUM 138 138  --  140 138  --   --  135*   POTASSIUM 4.6 4.1  --  4.3 3.9  --   --  3.6   CHLORIDE 101 102  --  103 101  --   --  97*   CO2 29.0 24.0  --  27.0 26.0  --   --  23.0   ANION GAP 8.0 12.0  --  10.0 11.0  --   --  15.0   BUN 24* 22  --  25* 24*  --   --  19   CREATININE 0.79 0.75*  --  0.74* 0.65* 0.75*   < > 0.81   GLUCOSE 252* 258*  --  279* 249*  --   --  227*   CALCIUM 8.9 9.0  --  8.8 9.0  --   --  8.8   HEMOGLOBIN A1C  --   --  11.20*  --   --   --   --   --     < > = values in this interval not displayed.         Lab 09/13/21  0525 09/12/21  0542 09/11/21  0419 09/10/21  0658 09/09/21  0138 09/08/21  2005 09/08/21  2005   TOTAL PROTEIN 6.1 6.7 6.3 6.5 6.9   < > 7.2   ALBUMIN 3.10* 3.40* 3.00* 3.30* 3.50   < > 3.80   GLOBULIN 3.0 3.3 3.3 3.2  --   --  3.4   ALT (SGPT) 21 21 19 19 23   < > 25   AST (SGOT) 20 19 17 18 25   < > 30   BILIRUBIN 0.6 0.6 0.5 0.4 0.6   < > 0.6   INDIRECT BILIRUBIN  --   --   --   --  0.4  --   --    BILIRUBIN DIRECT  --   --   --   --  0.2  --   --    ALK PHOS 67 71 62 62 61   < > 66    < > = values in this interval not displayed.         Lab 09/08/21 2005   PROBNP 245.4   TROPONIN T <0.010             Lab 09/09/21  0138   FERRITIN 1,738.00*         Brief Urine Lab Results     None          Microbiology Results Abnormal     Procedure Component Value - Date/Time    Blood Culture - Blood, Arm, Left [[id number]] Collected: 09/08/21 2322    Lab Status: Final result Specimen: Blood from Arm, Left Updated: 09/13/21 2345     Blood Culture No growth at 5 days    Blood Culture - Blood, Arm, Right [[id number]] Collected: 09/08/21 2322     Lab Status: Final result Specimen: Blood from Arm, Right Updated: 09/13/21 9459     Blood Culture No growth at 5 days          No radiology results from the last 24 hrs        I have reviewed the medications:  Scheduled Meds:albuterol sulfate HFA, 2 puff, Inhalation, 4x Daily - RT  dextromethorphan polistirex ER, 60 mg, Oral, Q12H  enoxaparin, 40 mg, Subcutaneous, Q24H  insulin detemir, 20 Units, Subcutaneous, Once  insulin detemir, 30 Units, Subcutaneous, Nightly  insulin lispro, 0-14 Units, Subcutaneous, TID AC  insulin lispro, 20 Units, Subcutaneous, TID With Meals  lisinopril, 20 mg, Oral, Daily      Continuous Infusions:   PRN Meds:.•  acetaminophen  •  benzonatate  •  dextrose  •  dextrose  •  diphenhydrAMINE  •  glucagon (human recombinant)    Assessment/Plan   Assessment & Plan     Active Hospital Problems    Diagnosis  POA   • **Pneumonia due to COVID-19 virus [U07.1, J12.82]  Yes   • Hypoxia [R09.02]  Unknown   • Diabetes mellitus, type II (CMS/HCC) [E11.9]  Unknown   • Thrombocytopenia (CMS/HCC) [D69.6]  Unknown   • Essential hypertension [I10]  Yes      Resolved Hospital Problems   No resolved problems to display.        Brief Hospital Course to date:  Flip Yu II is a 66 y.o. male with history of obesity, unvaccinated for COVID-19 who presented to the ER with shortness of breath for 3 days.  He was found to have COVID-19 and hypoxia in the ER.  He was admitted to Hospital Medicine for Acute Hypoxia due to COVID-19.    He is slow to wean and oxygen is at around 5 L / min    Acute hypoxic respiratory failure  COVID-19 pneumonia  - bilateral opacities on CTA seen on admission  - currently on high flow Nasal Cannula  - asked ID to see due to worsening oxygen requirements  - he is unvaccinated  - COVID-19 was detected on arrival in the ER  - supplemental oxygen as needed  - dexamethaxone Day # 8 - dose increased to 10 mg  - diving sugars up  - Remdesivir completed a 5 day course  - High Flow Nasal  cannula weaned to low flow nasal cannula 5 L  - looks like he may need oxygen at discharge but he doesn't want that  - possibly home when on 3 L    Hypertension  - currently on lisinopril 20 mg (lower than home dose)  - steroids could drive pressure up, however  - lower bp seen here  - decreased lisinopril dose and bp doing well  - blood pressure well controlled  - 120/72 today    Uncontrolled Diabetes  - hemoglobin A1C ~ 11.2  - BG in the 200 + range  - Diabetes educator  - 53 units of coverage needed in last 24 hours  -Increase long-acting insulin to 30 units at bedtime  -Increase mealtime insulin to 20 units with meals    Obesity  - risk factor in COVID-19 disease    Adjust long acting and mealtime insulin today  Encouraged IS exercises and explained rationale on why they are important    Watch sugars now that Decadron completing  Looks like last dose this morning at 10 mg  Will reorder to complete 10 days    DVT prophylaxis:  Medical DVT prophylaxis orders are present.            Disposition: I expect the patient to be discharged TBD    CODE STATUS:   Code Status and Medical Interventions:   Ordered at: 09/08/21 2402     Level Of Support Discussed With:    Patient     Code Status:    CPR     Medical Interventions (Level of Support Prior to Arrest):    Full       Zev Mistry MD  09/15/21

## 2021-09-15 NOTE — PROGRESS NOTES
"INFECTIOUS DISEASE Progress note     Flip Yu II  1955  [id number]    Admission Date: 9/8/2021    Requesting Provider: Fede  Evaluating Physician: Jose Martin    Reason for Consultation: COVID 19 pneumonia     History of present illness:    Patient is a 66 y.o. male, unvaccinated, PMH HTN, seen today for COVID 19 pneumonia. He presented to the ED with complaints of nasal congestion, cough over the past several weeks.  Oxygen saturation of 89% ini the ED.  CXR with subtle faint bilateral infiltrates, CTA chest with multifocal parenchymal opacities compatible with multifocal pneumonia. Admitting labs with WBC 7.36, PCT 0.10, CRP 4.34, ferritin 1738,  And he has been afebrile.   He is now on 45L70% FIO2 HFNC.  He was started on Remdesivir and Dexamethasone and we were consulted for evaluated and treatment. Feels \"like crap\"    9/12/21: he remains afebrile.  He remains on HFNC 45L/65% FIO2.  CRP improved. Feeling better today.     9/13/21: Afebrile.  Relatively comfortable.  Complains about being unable to get much sleep in the hospital due to interruptions.  Still has some cough. 60% FIO2.     9/14/21: resting comfortably. Afebrile. Down to 6L O2. Pt non-cooperative with staff.     9/15/21: Resting comfortably watching TV.  No new complaints per patient.  Improving cough and dyspnea.  Down to 5 L of oxygen    ROS:  No fevers or chills. No n/v/d. No rash. No new ADR to Abx.       No Known Allergies      Medication:    Current Facility-Administered Medications:   •  acetaminophen (TYLENOL) tablet 650 mg, 650 mg, Oral, Q6H PRN, Marcial, Sindy G, DO  •  albuterol sulfate HFA (PROVENTIL HFA;VENTOLIN HFA;PROAIR HFA) inhaler 2 puff, 2 puff, Inhalation, 4x Daily - RT, Marcial, Sindy G, DO, 2 puff at 09/15/21 1612  •  benzonatate (TESSALON) capsule 200 mg, 200 mg, Oral, TID PRN, Marcial, Sindy G, DO, 200 mg at 09/13/21 2036  •  [START ON 9/16/2021] dexamethasone (DECADRON) tablet 10 mg, 10 mg, Oral, Daily With " Breakfast, Zev Mistry MD  •  dextromethorphan polistirex ER (DELSYM) 30 MG/5ML oral suspension 60 mg, 60 mg, Oral, Q12H, Sindy Ceja DO, 60 mg at 21 0845  •  dextrose (D50W) 25 g/ 50mL Intravenous Solution 25 g, 25 g, Intravenous, Q15 Min PRN, Zev Mistry MD  •  dextrose (GLUTOSE) oral gel 15 g, 15 g, Oral, Q15 Min PRN, Zev Mistry MD  •  diphenhydrAMINE (BENADRYL) capsule 25 mg, 25 mg, Oral, Nightly PRN, Zev Mistry MD, 25 mg at 21 2201  •  enoxaparin (LOVENOX) syringe 40 mg, 40 mg, Subcutaneous, Q24H, Judy Diaz APRN, 40 mg at 09/15/21 0851  •  glucagon (human recombinant) (GLUCAGEN DIAGNOSTIC) injection 1 mg, 1 mg, Subcutaneous, Q15 Min PRN, Zev Mistry MD  •  insulin detemir (LEVEMIR) injection 30 Units, 30 Units, Subcutaneous, Nightly, Zev Mistry MD  •  insulin lispro (humaLOG) injection 0-14 Units, 0-14 Units, Subcutaneous, TID AC, Zev Mistry MD, 5 Units at 09/15/21 1212  •  insulin lispro (humaLOG) injection 30 Units, 30 Units, Subcutaneous, TID With Meals, Zev Mistry MD  •  lisinopril (PRINIVIL,ZESTRIL) tablet 20 mg, 20 mg, Oral, Daily, Zev Mistry MD, 20 mg at 09/15/21 0851    Antibiotics:  Anti-Infectives (From admission, onward)    Ordered     Dose/Rate Route Frequency Start Stop    21 0022  remdesivir 100 mg in sodium chloride 0.9 % 270 mL IVPB (powder vial)     Ordering Provider: Judy Diaz, APRN    100 mg  over 60 Minutes Intravenous Every 24 Hours 09/10/21 0900 21 0916    21 0022  remdesivir 200 mg in sodium chloride 0.9 % 290 mL IVPB (powder vial)     Ordering Provider: Judy Diaz APRN    200 mg  over 60 Minutes Intravenous Every 24 Hours 21 0230 21 0257           Physical Exam:   Vital Signs  Temp (24hrs), Av.2 °F (36.8 °C), Min:98 °F (36.7 °C), Max:98.5 °F (36.9 °C)    Temp  Min: 98 °F (36.7 °C)  Max: 98.5 °F (36.9 °C)  BP  Min: 113/71  Max: 157/83  Pulse  Min: 48  Max: 108  Resp  Min: 18   Max: 21  SpO2  Min: 90 %  Max: 96 %    Due to the COVID-19 pandemic, and in an effort to preserve PPE, I did not directly examine the patient but directly visualized him through the window and spoke to him via telephone.    GENERAL: Awake and alert, comfortable   HEENT: Normocephalic, atraumatic   HEART: RRR on monitor  LUNGS: normal respiratory effort. On HFNC. No cough noted  ABDOMEN: , nondistended. Non-tender  EXT:  No edema.   :  Without Rivas catheter.  SKIN: no rash on visible skin  NEURO: Oriented to PPT..  PSYCHIATRIC: Normal insight and judgement. Cooperative with PE    Laboratory Data    Results from last 7 days   Lab Units 09/13/21  0525 09/12/21  0542 09/11/21  0419   WBC 10*3/mm3 9.27 8.28 7.90   HEMOGLOBIN g/dL 14.4 15.5 14.7   HEMATOCRIT % 42.9 46.6 43.5   PLATELETS 10*3/mm3 238 235 203     Results from last 7 days   Lab Units 09/13/21  0525   SODIUM mmol/L 138   POTASSIUM mmol/L 4.6   CHLORIDE mmol/L 101   CO2 mmol/L 29.0   BUN mg/dL 24*   CREATININE mg/dL 0.79   GLUCOSE mg/dL 252*   CALCIUM mg/dL 8.9     Results from last 7 days   Lab Units 09/13/21  0525 09/10/21  0658 09/09/21  0138   ALK PHOS U/L 67   < > 61   BILIRUBIN mg/dL 0.6   < > 0.6   BILIRUBIN DIRECT mg/dL  --   --  0.2   ALT (SGPT) U/L 21   < > 23   AST (SGOT) U/L 20   < > 25    < > = values in this interval not displayed.         Results from last 7 days   Lab Units 09/13/21  0525   CRP mg/dL 0.46     Results from last 7 days   Lab Units 09/08/21 2005   LACTATE mmol/L 1.8             Estimated Creatinine Clearance: 111.6 mL/min (by C-G formula based on SCr of 0.79 mg/dL).      Microbiology:  9/8 blood cx pending, so far neg      Radiology:  Imaging Results (Last 72 Hours)     ** No results found for the last 72 hours. **           Impression:   - Severe COVID 19 pneumonia, with multifocal parenchymal opacities by CTA chest, CRP 4, now improved   - Acute hypoxic respiratory failure, now on HFNC: Negative procalcitonin. Unlikely  bacterial coinfection. CTA negative for PE  - HTN  - poorly controlled DM2: A1c 11.2    PLAN/RECOMMENDATIONS:   - Continue Dexamethasone.  Currently day 5 of higher dose.  Taper to 6 mg tomorrow for 5 additional  - completed remdesivir on 9/13  - Pt refused toculizumab  - Covid isolation for 20 days from symptom onset  - DVT ppx while inpatient.   - wean O2 as tolerated    Could consider discharge home once stable on 3-4 L O2. Hopefully in the next 1-2 days     I will follow    Dylan Philippe MD

## 2021-09-15 NOTE — PROGRESS NOTES
"INFECTIOUS DISEASE Progress note     Flip Yu II  1955  [id number]    Admission Date: 9/8/2021    Requesting Provider: Fede  Evaluating Physician: Jose Martin    Reason for Consultation: COVID 19 pneumonia     History of present illness:    Patient is a 66 y.o. male, unvaccinated, PMH HTN, seen today for COVID 19 pneumonia. He presented to the ED with complaints of nasal congestion, cough over the past several weeks.  Oxygen saturation of 89% ini the ED.  CXR with subtle faint bilateral infiltrates, CTA chest with multifocal parenchymal opacities compatible with multifocal pneumonia. Admitting labs with WBC 7.36, PCT 0.10, CRP 4.34, ferritin 1738,  And he has been afebrile.   He is now on 45L70% FIO2 HFNC.  He was started on Remdesivir and Dexamethasone and we were consulted for evaluated and treatment. Feels \"like crap\"    9/12/21: he remains afebrile.  He remains on HFNC 45L/65% FIO2.  CRP improved. Feeling better today.     9/13/21: Afebrile.  Relatively comfortable.  Complains about being unable to get much sleep in the hospital due to interruptions.  Still has some cough. 60% FIO2.     9/14/21: resting comfortably. Afebrile. Down to 6L O2. Pt non-cooperative with staff.     ROS:  No new complaints per patient.       No Known Allergies      Medication:    Current Facility-Administered Medications:   •  acetaminophen (TYLENOL) tablet 650 mg, 650 mg, Oral, Q6H PRN, Marcial, Sindy G, DO  •  albuterol sulfate HFA (PROVENTIL HFA;VENTOLIN HFA;PROAIR HFA) inhaler 2 puff, 2 puff, Inhalation, 4x Daily - RT, Marcial, Sindy G, DO, 2 puff at 09/14/21 1855  •  benzonatate (TESSALON) capsule 200 mg, 200 mg, Oral, TID PRN, Marcial, Sindy G, DO, 200 mg at 09/13/21 2036  •  dexamethasone (DECADRON) tablet 10 mg, 10 mg, Oral, Daily With Breakfast, Apolonia Treviño, WILFREDO, 10 mg at 09/14/21 0844  •  dextromethorphan polistirex ER (DELSYM) 30 MG/5ML oral suspension 60 mg, 60 mg, Oral, Q12H, Sindy Ceja DO, 60 mg at " 21 0845  •  dextrose (D50W) 25 g/ 50mL Intravenous Solution 25 g, 25 g, Intravenous, Q15 Min PRN, Zev Mistry MD  •  dextrose (GLUTOSE) oral gel 15 g, 15 g, Oral, Q15 Min PRN, Zev Mistry MD  •  diphenhydrAMINE (BENADRYL) capsule 25 mg, 25 mg, Oral, Nightly PRN, Zev Mistry MD, 25 mg at 21 2201  •  enoxaparin (LOVENOX) syringe 40 mg, 40 mg, Subcutaneous, Q24H, Judy Diaz APRN, 40 mg at 21 0845  •  glucagon (human recombinant) (GLUCAGEN DIAGNOSTIC) injection 1 mg, 1 mg, Subcutaneous, Q15 Min PRN, Zev Mistry MD  •  insulin detemir (LEVEMIR) injection 20 Units, 20 Units, Subcutaneous, Once, Columba Walsh PA-C  •  [START ON 9/15/2021] insulin detemir (LEVEMIR) injection 30 Units, 30 Units, Subcutaneous, Nightly, Zev Mistry MD  •  insulin lispro (humaLOG) injection 0-14 Units, 0-14 Units, Subcutaneous, TID AC, Zev Mistry MD, 10 Units at 21 1735  •  [START ON 9/15/2021] insulin lispro (humaLOG) injection 20 Units, 20 Units, Subcutaneous, TID With Meals, Zev Mistry MD  •  lisinopril (PRINIVIL,ZESTRIL) tablet 20 mg, 20 mg, Oral, Daily, Zev Mistry MD, 20 mg at 21 0845    Antibiotics:  Anti-Infectives (From admission, onward)    Ordered     Dose/Rate Route Frequency Start Stop    21 0022  remdesivir 100 mg in sodium chloride 0.9 % 270 mL IVPB (powder vial)     Ordering Provider: Judy Diaz APRN    100 mg  over 60 Minutes Intravenous Every 24 Hours 09/10/21 0900 21 0916    21 0022  remdesivir 200 mg in sodium chloride 0.9 % 290 mL IVPB (powder vial)     Ordering Provider: Emily, Judy, APRN    200 mg  over 60 Minutes Intravenous Every 24 Hours 21 0230 21 0257           Physical Exam:   Vital Signs  Temp (24hrs), Av.1 °F (36.7 °C), Min:98 °F (36.7 °C), Max:98.2 °F (36.8 °C)    Temp  Min: 98 °F (36.7 °C)  Max: 98.2 °F (36.8 °C)  BP  Min: 113/71  Max: 155/87  Pulse  Min: 49  Max: 88  Resp  Min: 18  Max:  22  SpO2  Min: 91 %  Max: 95 %    Due to the COVID-19 pandemic, and in an effort to preserve PPE, I did not directly examine the patient but directly visualized him through the window and spoke to him via telephone.    GENERAL: Awake and alert, comfortable   HEENT: Normocephalic, atraumatic   HEART: RRR on monitor  LUNGS: normal respiratory effort. On HFNC. No cough noted  ABDOMEN: , nondistended. Non-tender  EXT:  No edema.   :  Without Rivas catheter.  SKIN: no rash on visible skin  NEURO: Oriented to PPT..  PSYCHIATRIC: Normal insight and judgement. Cooperative with PE    Laboratory Data    Results from last 7 days   Lab Units 09/13/21  0525 09/12/21  0542 09/11/21  0419   WBC 10*3/mm3 9.27 8.28 7.90   HEMOGLOBIN g/dL 14.4 15.5 14.7   HEMATOCRIT % 42.9 46.6 43.5   PLATELETS 10*3/mm3 238 235 203     Results from last 7 days   Lab Units 09/13/21  0525   SODIUM mmol/L 138   POTASSIUM mmol/L 4.6   CHLORIDE mmol/L 101   CO2 mmol/L 29.0   BUN mg/dL 24*   CREATININE mg/dL 0.79   GLUCOSE mg/dL 252*   CALCIUM mg/dL 8.9     Results from last 7 days   Lab Units 09/13/21  0525 09/10/21  0658 09/09/21  0138   ALK PHOS U/L 67   < > 61   BILIRUBIN mg/dL 0.6   < > 0.6   BILIRUBIN DIRECT mg/dL  --   --  0.2   ALT (SGPT) U/L 21   < > 23   AST (SGOT) U/L 20   < > 25    < > = values in this interval not displayed.         Results from last 7 days   Lab Units 09/13/21  0525   CRP mg/dL 0.46     Results from last 7 days   Lab Units 09/08/21 2005   LACTATE mmol/L 1.8             Estimated Creatinine Clearance: 112.6 mL/min (by C-G formula based on SCr of 0.79 mg/dL).      Microbiology:  9/8 blood cx pending, so far neg      Radiology:  Imaging Results (Last 72 Hours)     ** No results found for the last 72 hours. **           Impression:   - Severe COVID 19 pneumonia, with multifocal parenchymal opacities by CTA chest, CRP 4, now improved   - Acute hypoxic respiratory failure, now on HFNC: Negative procalcitonin. Unlikely bacterial  coinfection. CTA negative for PE  - HTN  - poorly controlled DM2: A1c 11.2    PLAN/RECOMMENDATIONS:   - Follow CBC, CMP     -Continue Dexamethasone but increase to 10 mg daily for 5 days, then taper. Currently day 4 of higher dose.   - completed remdesivir on 9/13  - Pt refused toculizumab  - Covid isolation for 20 days from symptom onset  - DVT ppx while inpatient.   - wean O2 as tolerated    Could consider discharge home once stable on 3-4 L O2.      I will follow    Dylan Philippe MD

## 2021-09-15 NOTE — CONSULTS
Consult received for diabetes education. Chart reviewed. I spoke with patient by phone due to Covid positive isolation. Patient stated he has a history of diabetes. He has not been checking blood sugar at home. He was taking an oral medication for his blood sugars but did not know the name. He gave permission for me to contact his wife.   We discussed the significance of his A1c of 11.2%. We discussed importance of blood sugar control to help reduce risk of complications. Patient thought the insulin was causing his glucose to elevate. We discussed how illness and medications like Decadron can acutely elevate his blood sugar.explained that insulin is a medication used to lower blood sugar and discussed how it works and why it is needed. Also discussed possibility of needing additional medication at discharge to help with blood glucose management. Patient stated he does not want any further problems from Covid.  Explained that lifestyle changes are encouraged and offered outpatient education. Patient declined. Patient not happy with meal tray. Explained consistent carbohydrates and how that is beneficial to blood sugars.   Attempted to call patient's wife per patient request to discuss home management and clarify medications. Called both home and cell phone for wife, VM left on cell phone.   Please provide prescription for glucometer and supplies at discharge.   Patient mailed diabetes education materials.

## 2021-09-15 NOTE — PROGRESS NOTES
Clinical Nutrition       Patient Name: Flip Yu II  YOB: 1955  MRN: [id number]  Date of Encounter: 09/15/21 11:12 EDT  Admission date: 2021      Reason for Visit   LOS      EMR  Reviewed   Yes  Covid-19  PNA       Reported/Observed/Food/Nutrition Related - Comments          Current Nutrition Prescription     Diet Regular; Cardiac  No active supplement orders      Average Intake from [place]%/4 meals      Actions     Follow treatment progress, Care plan reviewed, Menu adjusted, Supplement provided   -Add consistent carbohydrate restriction to PO diet order  -Send Boost Glucose Control 3x/day    Monitor Per Protocol      Devika Hawk, MS RD/LD CNSC  Time Spent: 10 minutes

## 2021-09-16 LAB
ANION GAP SERPL CALCULATED.3IONS-SCNC: 9 MMOL/L (ref 5–15)
BUN SERPL-MCNC: 24 MG/DL (ref 8–23)
BUN/CREAT SERPL: 30.8 (ref 7–25)
CALCIUM SPEC-SCNC: 8.7 MG/DL (ref 8.6–10.5)
CHLORIDE SERPL-SCNC: 104 MMOL/L (ref 98–107)
CO2 SERPL-SCNC: 27 MMOL/L (ref 22–29)
CREAT SERPL-MCNC: 0.78 MG/DL (ref 0.76–1.27)
CRP SERPL-MCNC: <0.3 MG/DL (ref 0–0.5)
DEPRECATED RDW RBC AUTO: 38.1 FL (ref 37–54)
ERYTHROCYTE [DISTWIDTH] IN BLOOD BY AUTOMATED COUNT: 11.2 % (ref 12.3–15.4)
ERYTHROCYTE [SEDIMENTATION RATE] IN BLOOD: 12 MM/HR (ref 0–20)
FERRITIN SERPL-MCNC: 867.7 NG/ML (ref 30–400)
GFR SERPL CREATININE-BSD FRML MDRD: 99 ML/MIN/1.73
GLUCOSE BLDC GLUCOMTR-MCNC: 111 MG/DL (ref 70–130)
GLUCOSE BLDC GLUCOMTR-MCNC: 177 MG/DL (ref 70–130)
GLUCOSE BLDC GLUCOMTR-MCNC: 277 MG/DL (ref 70–130)
GLUCOSE BLDC GLUCOMTR-MCNC: 288 MG/DL (ref 70–130)
GLUCOSE SERPL-MCNC: 227 MG/DL (ref 65–99)
HCT VFR BLD AUTO: 42.9 % (ref 37.5–51)
HGB BLD-MCNC: 14.9 G/DL (ref 13–17.7)
LDH SERPL-CCNC: 228 U/L (ref 135–225)
MCH RBC QN AUTO: 32.4 PG (ref 26.6–33)
MCHC RBC AUTO-ENTMCNC: 34.7 G/DL (ref 31.5–35.7)
MCV RBC AUTO: 93.3 FL (ref 79–97)
PLATELET # BLD AUTO: 287 10*3/MM3 (ref 140–450)
PMV BLD AUTO: 10.3 FL (ref 6–12)
POTASSIUM SERPL-SCNC: 4.3 MMOL/L (ref 3.5–5.2)
RBC # BLD AUTO: 4.6 10*6/MM3 (ref 4.14–5.8)
SODIUM SERPL-SCNC: 140 MMOL/L (ref 136–145)
WBC # BLD AUTO: 10.34 10*3/MM3 (ref 3.4–10.8)

## 2021-09-16 PROCEDURE — 63710000001 INSULIN LISPRO (HUMAN) PER 5 UNITS: Performed by: HOSPITALIST

## 2021-09-16 PROCEDURE — 85027 COMPLETE CBC AUTOMATED: CPT | Performed by: HOSPITALIST

## 2021-09-16 PROCEDURE — 83615 LACTATE (LD) (LDH) ENZYME: CPT | Performed by: HOSPITALIST

## 2021-09-16 PROCEDURE — 82962 GLUCOSE BLOOD TEST: CPT

## 2021-09-16 PROCEDURE — 82728 ASSAY OF FERRITIN: CPT | Performed by: HOSPITALIST

## 2021-09-16 PROCEDURE — 85652 RBC SED RATE AUTOMATED: CPT | Performed by: HOSPITALIST

## 2021-09-16 PROCEDURE — 99233 SBSQ HOSP IP/OBS HIGH 50: CPT | Performed by: INTERNAL MEDICINE

## 2021-09-16 PROCEDURE — 80048 BASIC METABOLIC PNL TOTAL CA: CPT | Performed by: HOSPITALIST

## 2021-09-16 PROCEDURE — 25010000002 ENOXAPARIN PER 10 MG: Performed by: NURSE PRACTITIONER

## 2021-09-16 PROCEDURE — 63710000001 DEXAMETHASONE PER 0.25 MG: Performed by: INTERNAL MEDICINE

## 2021-09-16 PROCEDURE — 94799 UNLISTED PULMONARY SVC/PX: CPT

## 2021-09-16 PROCEDURE — 86140 C-REACTIVE PROTEIN: CPT | Performed by: HOSPITALIST

## 2021-09-16 PROCEDURE — 63710000001 INSULIN DETEMIR PER 5 UNITS: Performed by: HOSPITALIST

## 2021-09-16 RX ORDER — LISINOPRIL 40 MG/1
40 TABLET ORAL DAILY
Status: DISCONTINUED | OUTPATIENT
Start: 2021-09-16 | End: 2021-09-19 | Stop reason: HOSPADM

## 2021-09-16 RX ADMIN — INSULIN LISPRO 3 UNITS: 100 INJECTION, SOLUTION INTRAVENOUS; SUBCUTANEOUS at 12:19

## 2021-09-16 RX ADMIN — INSULIN LISPRO 8 UNITS: 100 INJECTION, SOLUTION INTRAVENOUS; SUBCUTANEOUS at 08:51

## 2021-09-16 RX ADMIN — INSULIN LISPRO 30 UNITS: 100 INJECTION, SOLUTION INTRAVENOUS; SUBCUTANEOUS at 08:51

## 2021-09-16 RX ADMIN — ENOXAPARIN SODIUM 40 MG: 40 INJECTION SUBCUTANEOUS at 08:51

## 2021-09-16 RX ADMIN — ALBUTEROL SULFATE 2 PUFF: 90 AEROSOL, METERED RESPIRATORY (INHALATION) at 12:14

## 2021-09-16 RX ADMIN — INSULIN DETEMIR 30 UNITS: 100 INJECTION, SOLUTION SUBCUTANEOUS at 20:53

## 2021-09-16 RX ADMIN — ALBUTEROL SULFATE 2 PUFF: 90 AEROSOL, METERED RESPIRATORY (INHALATION) at 16:05

## 2021-09-16 RX ADMIN — LISINOPRIL 40 MG: 40 TABLET ORAL at 08:51

## 2021-09-16 RX ADMIN — INSULIN LISPRO 30 UNITS: 100 INJECTION, SOLUTION INTRAVENOUS; SUBCUTANEOUS at 12:18

## 2021-09-16 RX ADMIN — DEXAMETHASONE 6 MG: 2 TABLET ORAL at 08:51

## 2021-09-16 RX ADMIN — ALBUTEROL SULFATE 2 PUFF: 90 AEROSOL, METERED RESPIRATORY (INHALATION) at 20:02

## 2021-09-16 NOTE — PROGRESS NOTES
[facility]  PROGRESS NOTE    Patient Name: Flip Yu II  : 1955  MRN: [id number]    Date of Admission: 2021  Primary Care Physician: Nico Theodore MD    Subjective   Subjective     CC:  covid 19    HPI:  Resting in bed. Remains on 5L. Reports he feels good. Nursing reports easy desaturations with movement.     ROS:  Gen- No fevers, chills  CV- No chest pain, palpitations  Resp- No cough, dyspnea  GI- No N/V/D, abd pain        Objective   Objective     Vital Signs:   Temp:  [97.8 °F (36.6 °C)-98.5 °F (36.9 °C)] 98.4 °F (36.9 °C)  Heart Rate:  [] 49  Resp:  [16-20] 16  BP: (120-163)/(64-97) 160/90  Flow (L/min):  [5] 5     Physical Exam:  GEN- no acute distress noted, resting in bed, awake  HEENT- atraumatic, normocephalic, eomi  NECK- supple, trachea midline, no masses  RESP: ctab, normal effort, on 5L  CV: no murmurs, s1/s2, rrr  MSK: no edema noted, spontaneous movement of all extremities  NEURO: alert, oriented, no focal deficits  SKIN: no rashes  PSYCH: appropriate mood and affect       Results Reviewed:  LAB RESULTS:      Lab 21  0525 21  0542 21  0419 09/10/21  0658   WBC 9.27 8.28 7.90 6.27   HEMOGLOBIN 14.4 15.5 14.7 15.0   HEMATOCRIT 42.9 46.6 43.5 44.2   PLATELETS 238 235 203 164   NEUTROS ABS 6.43 6.05 5.98 4.61   IMMATURE GRANS (ABS) 0.06* 0.04 0.03 0.04   LYMPHS ABS 1.88 1.39 1.17 1.00   MONOS ABS 0.88 0.79 0.71 0.61   EOS ABS 0.01 0.00 0.00 0.00   MCV 93.7 95.1 94.2 94.0   CRP 0.46 0.82* 1.54* 3.03*         Lab 21  0525 21  0542 21  0420 21  0419 09/10/21  0658   SODIUM 138 138  --  140 138   POTASSIUM 4.6 4.1  --  4.3 3.9   CHLORIDE 101 102  --  103 101   CO2 29.0 24.0  --  27.0 26.0   ANION GAP 8.0 12.0  --  10.0 11.0   BUN 24* 22  --  25* 24*   CREATININE 0.79 0.75*  --  0.74* 0.65*   GLUCOSE 252* 258*  --  279* 249*   CALCIUM 8.9 9.0  --  8.8 9.0   HEMOGLOBIN A1C  --   --  11.20*  --    --          Lab 09/13/21  0525 09/12/21  0542 09/11/21  0419 09/10/21  0658   TOTAL PROTEIN 6.1 6.7 6.3 6.5   ALBUMIN 3.10* 3.40* 3.00* 3.30*   GLOBULIN 3.0 3.3 3.3 3.2   ALT (SGPT) 21 21 19 19   AST (SGOT) 20 19 17 18   BILIRUBIN 0.6 0.6 0.5 0.4   ALK PHOS 67 71 62 62                     Brief Urine Lab Results     None          Microbiology Results Abnormal     Procedure Component Value - Date/Time    Blood Culture - Blood, Arm, Left [[id number]] Collected: 09/08/21 2322    Lab Status: Final result Specimen: Blood from Arm, Left Updated: 09/13/21 2345     Blood Culture No growth at 5 days    Blood Culture - Blood, Arm, Right [[id number]] Collected: 09/08/21 2322    Lab Status: Final result Specimen: Blood from Arm, Right Updated: 09/13/21 2345     Blood Culture No growth at 5 days          No radiology results from the last 24 hrs        I have reviewed the medications:  Scheduled Meds:albuterol sulfate HFA, 2 puff, Inhalation, 4x Daily - RT  dexamethasone, 6 mg, Oral, Daily With Breakfast  dextromethorphan polistirex ER, 60 mg, Oral, Q12H  enoxaparin, 40 mg, Subcutaneous, Q24H  insulin detemir, 30 Units, Subcutaneous, Nightly  insulin lispro, 0-14 Units, Subcutaneous, TID AC  insulin lispro, 30 Units, Subcutaneous, TID With Meals  lisinopril, 20 mg, Oral, Daily      Continuous Infusions:   PRN Meds:.•  acetaminophen  •  benzonatate  •  dextrose  •  dextrose  •  diphenhydrAMINE  •  glucagon (human recombinant)    Assessment/Plan   Assessment & Plan     Active Hospital Problems    Diagnosis  POA   • **Pneumonia due to COVID-19 virus [U07.1, J12.82]  Yes   • Hypoxia [R09.02]  Unknown   • Diabetes mellitus, type II (CMS/HCC) [E11.9]  Unknown   • Thrombocytopenia (CMS/HCC) [D69.6]  Unknown   • Essential hypertension [I10]  Yes      Resolved Hospital Problems   No resolved problems to display.        Brief Hospital Course to date:  Flip Yu II is a 66 y.o. male with history of obesity, unvaccinated for COVID-19  who presented to the ER with shortness of breath for 3 days.  He was found to have COVID-19 and hypoxia in the ER.  He was admitted to Hospital Medicine for Acute Hypoxia due to COVID-19.    COVID 19   Hypoxia   --confirmed covid 19+ 9/08- continue isolation for 20 days following symptom onset   --has now completed 5 day course of remdesivir, Decadron D6/10- tapered dose starting today at 6mg qday for completion of 10 day course  --ID is following- patient refused actemera  --imaging reviewed, hold on abx as do not favor superimposed bacterial PNA  --wean oxygen as tolerated- continues to require 5L- could consider d/c once weaned to 3-4L consistently   --trend covid progression labs  --albuterol MDI, antitussive, antiemetics as needed  --Lovenox for DVT ppx  --continue airborne/contact precautions     Hypertension  - increase lisinopril back to home dose- BP trending up      Uncontrolled Diabetes  - hemoglobin A1C ~ 11.2  - BG in the 200 + range  - Diabetes educator has evaluated   - BS better today, no changes today as suspect will improve further with steroid dose weaning  - per DM educator d/w patient's wife, he was taking glipizide, metformin and tradjenta prior to admission- will need to continue these and have very close f/u with PCP (Dr Theodore)     Obesity  - risk factor in COVID-19 disease        DVT prophylaxis:  Medical DVT prophylaxis orders are present.            Disposition: I expect the patient to be discharged pending ability to wean oxygen    CODE STATUS:   Code Status and Medical Interventions:   Ordered at: 09/08/21 7441     Level Of Support Discussed With:    Patient     Code Status:    CPR     Medical Interventions (Level of Support Prior to Arrest):    Full       Alysa Quiros MD  09/16/21

## 2021-09-16 NOTE — CASE MANAGEMENT/SOCIAL WORK
Continued Stay Note  [facility]     Patient Name: Flip Yu II  MRN: [id number]  Today's Date: 9/16/2021    Admit Date: 9/8/2021    Discharge Plan     Row Name 09/16/21 1223       Plan    Plan  Home    Patient/Family in Agreement with Plan  other (see comments) Pt. is uncooperative with care    Plan Comments  Pt very matter of fact. Per nursing will not cooperate with proning and breathing exercises. States he will not go home with O2. He remains on 5L O2 NC. Will continue to follow.    Final Discharge Disposition Code  01 - home or self-care        Discharge Codes    No documentation.             Lucia Velez RN

## 2021-09-16 NOTE — PLAN OF CARE
Goal Outcome Evaluation:  Plan of Care Reviewed With: patient        Progress: improving  Outcome Summary: pt rested well during the night. he is on 5l nasal cannula. no complaints of pain during the night. pt did get up to the side of the bed and the bedside commode. will continue to monitor.

## 2021-09-17 LAB
ALBUMIN SERPL-MCNC: 3.1 G/DL (ref 3.5–5.2)
ALBUMIN/GLOB SERPL: 1.1 G/DL
ALP SERPL-CCNC: 81 U/L (ref 39–117)
ALT SERPL W P-5'-P-CCNC: 34 U/L (ref 1–41)
ANION GAP SERPL CALCULATED.3IONS-SCNC: 8 MMOL/L (ref 5–15)
AST SERPL-CCNC: 18 U/L (ref 1–40)
BASOPHILS # BLD AUTO: 0.02 10*3/MM3 (ref 0–0.2)
BASOPHILS NFR BLD AUTO: 0.2 % (ref 0–1.5)
BILIRUB SERPL-MCNC: 0.6 MG/DL (ref 0–1.2)
BUN SERPL-MCNC: 25 MG/DL (ref 8–23)
BUN/CREAT SERPL: 30.9 (ref 7–25)
CALCIUM SPEC-SCNC: 8.9 MG/DL (ref 8.6–10.5)
CHLORIDE SERPL-SCNC: 100 MMOL/L (ref 98–107)
CO2 SERPL-SCNC: 29 MMOL/L (ref 22–29)
CREAT SERPL-MCNC: 0.81 MG/DL (ref 0.76–1.27)
DEPRECATED RDW RBC AUTO: 39.8 FL (ref 37–54)
EOSINOPHIL # BLD AUTO: 0.06 10*3/MM3 (ref 0–0.4)
EOSINOPHIL NFR BLD AUTO: 0.6 % (ref 0.3–6.2)
ERYTHROCYTE [DISTWIDTH] IN BLOOD BY AUTOMATED COUNT: 11.4 % (ref 12.3–15.4)
GFR SERPL CREATININE-BSD FRML MDRD: 95 ML/MIN/1.73
GLOBULIN UR ELPH-MCNC: 2.8 GM/DL
GLUCOSE BLDC GLUCOMTR-MCNC: 147 MG/DL (ref 70–130)
GLUCOSE BLDC GLUCOMTR-MCNC: 187 MG/DL (ref 70–130)
GLUCOSE BLDC GLUCOMTR-MCNC: 189 MG/DL (ref 70–130)
GLUCOSE BLDC GLUCOMTR-MCNC: 227 MG/DL (ref 70–130)
GLUCOSE SERPL-MCNC: 215 MG/DL (ref 65–99)
HCT VFR BLD AUTO: 44.3 % (ref 37.5–51)
HGB BLD-MCNC: 14.9 G/DL (ref 13–17.7)
IMM GRANULOCYTES # BLD AUTO: 0.17 10*3/MM3 (ref 0–0.05)
IMM GRANULOCYTES NFR BLD AUTO: 1.6 % (ref 0–0.5)
LYMPHOCYTES # BLD AUTO: 1.99 10*3/MM3 (ref 0.7–3.1)
LYMPHOCYTES NFR BLD AUTO: 18.3 % (ref 19.6–45.3)
MCH RBC QN AUTO: 32.1 PG (ref 26.6–33)
MCHC RBC AUTO-ENTMCNC: 33.6 G/DL (ref 31.5–35.7)
MCV RBC AUTO: 95.5 FL (ref 79–97)
MONOCYTES # BLD AUTO: 1.09 10*3/MM3 (ref 0.1–0.9)
MONOCYTES NFR BLD AUTO: 10 % (ref 5–12)
NEUTROPHILS NFR BLD AUTO: 69.3 % (ref 42.7–76)
NEUTROPHILS NFR BLD AUTO: 7.52 10*3/MM3 (ref 1.7–7)
NRBC BLD AUTO-RTO: 0 /100 WBC (ref 0–0.2)
PLATELET # BLD AUTO: 317 10*3/MM3 (ref 140–450)
PMV BLD AUTO: 10.2 FL (ref 6–12)
POTASSIUM SERPL-SCNC: 4.2 MMOL/L (ref 3.5–5.2)
PROT SERPL-MCNC: 5.9 G/DL (ref 6–8.5)
RBC # BLD AUTO: 4.64 10*6/MM3 (ref 4.14–5.8)
SODIUM SERPL-SCNC: 137 MMOL/L (ref 136–145)
WBC # BLD AUTO: 10.85 10*3/MM3 (ref 3.4–10.8)

## 2021-09-17 PROCEDURE — 63710000001 DIPHENHYDRAMINE PER 50 MG: Performed by: HOSPITALIST

## 2021-09-17 PROCEDURE — 85025 COMPLETE CBC W/AUTO DIFF WBC: CPT | Performed by: INTERNAL MEDICINE

## 2021-09-17 PROCEDURE — 94799 UNLISTED PULMONARY SVC/PX: CPT

## 2021-09-17 PROCEDURE — 82962 GLUCOSE BLOOD TEST: CPT

## 2021-09-17 PROCEDURE — 63710000001 INSULIN DETEMIR PER 5 UNITS: Performed by: HOSPITALIST

## 2021-09-17 PROCEDURE — 63710000001 INSULIN LISPRO (HUMAN) PER 5 UNITS: Performed by: HOSPITALIST

## 2021-09-17 PROCEDURE — 80053 COMPREHEN METABOLIC PANEL: CPT | Performed by: INTERNAL MEDICINE

## 2021-09-17 PROCEDURE — 25010000002 ENOXAPARIN PER 10 MG: Performed by: NURSE PRACTITIONER

## 2021-09-17 PROCEDURE — 99232 SBSQ HOSP IP/OBS MODERATE 35: CPT | Performed by: NURSE PRACTITIONER

## 2021-09-17 RX ADMIN — ENOXAPARIN SODIUM 40 MG: 40 INJECTION SUBCUTANEOUS at 08:48

## 2021-09-17 RX ADMIN — ALBUTEROL SULFATE 2 PUFF: 90 AEROSOL, METERED RESPIRATORY (INHALATION) at 12:08

## 2021-09-17 RX ADMIN — ALBUTEROL SULFATE 2 PUFF: 90 AEROSOL, METERED RESPIRATORY (INHALATION) at 19:37

## 2021-09-17 RX ADMIN — DEXAMETHASONE 6 MG: 2 TABLET ORAL at 08:47

## 2021-09-17 RX ADMIN — LISINOPRIL 40 MG: 40 TABLET ORAL at 08:39

## 2021-09-17 RX ADMIN — INSULIN DETEMIR 30 UNITS: 100 INJECTION, SOLUTION SUBCUTANEOUS at 22:25

## 2021-09-17 RX ADMIN — INSULIN LISPRO 5 UNITS: 100 INJECTION, SOLUTION INTRAVENOUS; SUBCUTANEOUS at 17:28

## 2021-09-17 RX ADMIN — DIPHENHYDRAMINE HYDROCHLORIDE 25 MG: 25 CAPSULE ORAL at 22:25

## 2021-09-17 RX ADMIN — INSULIN LISPRO 3 UNITS: 100 INJECTION, SOLUTION INTRAVENOUS; SUBCUTANEOUS at 12:03

## 2021-09-17 RX ADMIN — INSULIN LISPRO 3 UNITS: 100 INJECTION, SOLUTION INTRAVENOUS; SUBCUTANEOUS at 08:48

## 2021-09-17 RX ADMIN — INSULIN LISPRO 30 UNITS: 100 INJECTION, SOLUTION INTRAVENOUS; SUBCUTANEOUS at 17:28

## 2021-09-17 RX ADMIN — INSULIN LISPRO 30 UNITS: 100 INJECTION, SOLUTION INTRAVENOUS; SUBCUTANEOUS at 12:04

## 2021-09-17 RX ADMIN — INSULIN LISPRO 30 UNITS: 100 INJECTION, SOLUTION INTRAVENOUS; SUBCUTANEOUS at 08:47

## 2021-09-17 NOTE — PROGRESS NOTES
"    [facility] Medicine Services  PROGRESS NOTE    Patient Name: Flip Yu II  : 1955  MRN: [id number]    Date of Admission: 2021  Primary Care Physician: Nico Theodore MD    Subjective   Subjective     CC:  covid 19    HPI:  Patient seen resting in bed in no acute distress.  Dozing.  Awakens easily.  Dates he feels \"fantastic\".  Tolerating diet as best he can but does not like the food.  Asking for ice cream.  Denies cough.  Denies shortness of air at rest.  Requiring 3.5 LNC oxygen with sats currently 90%.  Desaturates with exertion.    ROS:  Gen- No fevers, chills  CV- No chest pain, palpitations  Resp-as above  GI- No N/V/D, abd pain    Objective   Objective     Vital Signs:   Temp:  [97.8 °F (36.6 °C)-98.3 °F (36.8 °C)] 97.8 °F (36.6 °C)  Heart Rate:  [49-67] 66  Resp:  [16-18] 17  BP: (121-155)/(69-88) 131/80  Flow (L/min):  [3-5] 3     Physical Exam:  With patient's consent, physical exam was conducted via visual telemedicine encounter due to patient's current isolation requirements in the interest of PPE conservation.    Constitutional: No acute distress, dozing back in bed.  Awakens easily to voice. nontoxic  HENT: NCAT, no conjunctival injection  Respiratory: Good effort, nonlabored respirations currently at rest on 3.5 LNC with sats 90%.  Cardiovascular:  tele with NSR  Musculoskeletal: No edema, overweight.  Psychiatric: Appropriate affect, good insight and judgement, cooperative  Neurologic: Oriented x 3, movements symmetric BUE and BLE, speech clear and fluent.  Follows commands.  Skin: No visible rashes, no jaundice seen on exposed skin through window        Results Reviewed:  LAB RESULTS:      Lab 21  0645 21  0618 21  0525 21  0542 21  0419   WBC 10.85* 10.34 9.27 8.28 7.90   HEMOGLOBIN 14.9 14.9 14.4 15.5 14.7   HEMATOCRIT 44.3 42.9 42.9 46.6 43.5   PLATELETS 317 287 238 235 203   NEUTROS ABS 7.52*  --  6.43 6.05 5.98 "   IMMATURE GRANS (ABS) 0.17*  --  0.06* 0.04 0.03   LYMPHS ABS 1.99  --  1.88 1.39 1.17   MONOS ABS 1.09*  --  0.88 0.79 0.71   EOS ABS 0.06  --  0.01 0.00 0.00   MCV 95.5 93.3 93.7 95.1 94.2   SED RATE  --  12  --   --   --    CRP  --  <0.30 0.46 0.82* 1.54*   LDH  --  228*  --   --   --          Lab 09/17/21  0645 09/16/21  0618 09/13/21  0525 09/12/21  0542 09/11/21  0420 09/11/21  0419   SODIUM 137 140 138 138  --  140   POTASSIUM 4.2 4.3 4.6 4.1  --  4.3   CHLORIDE 100 104 101 102  --  103   CO2 29.0 27.0 29.0 24.0  --  27.0   ANION GAP 8.0 9.0 8.0 12.0  --  10.0   BUN 25* 24* 24* 22  --  25*   CREATININE 0.81 0.78 0.79 0.75*  --  0.74*   GLUCOSE 215* 227* 252* 258*  --  279*   CALCIUM 8.9 8.7 8.9 9.0  --  8.8   HEMOGLOBIN A1C  --   --   --   --  11.20*  --          Lab 09/17/21  0645 09/13/21  0525 09/12/21  0542 09/11/21  0419   TOTAL PROTEIN 5.9* 6.1 6.7 6.3   ALBUMIN 3.10* 3.10* 3.40* 3.00*   GLOBULIN 2.8 3.0 3.3 3.3   ALT (SGPT) 34 21 21 19   AST (SGOT) 18 20 19 17   BILIRUBIN 0.6 0.6 0.6 0.5   ALK PHOS 81 67 71 62                 Lab 09/16/21  0618   FERRITIN 867.70*         Brief Urine Lab Results     None          Microbiology Results Abnormal     Procedure Component Value - Date/Time    Blood Culture - Blood, Arm, Left [[id number]] Collected: 09/08/21 2322    Lab Status: Final result Specimen: Blood from Arm, Left Updated: 09/13/21 2345     Blood Culture No growth at 5 days    Blood Culture - Blood, Arm, Right [[id number]] Collected: 09/08/21 2322    Lab Status: Final result Specimen: Blood from Arm, Right Updated: 09/13/21 2345     Blood Culture No growth at 5 days          No radiology results from the last 24 hrs        I have reviewed the medications:  Scheduled Meds:albuterol sulfate HFA, 2 puff, Inhalation, 4x Daily - RT  dexamethasone, 6 mg, Oral, Daily With Breakfast  dextromethorphan polistirex ER, 60 mg, Oral, Q12H  enoxaparin, 40 mg, Subcutaneous, Q24H  insulin detemir, 30 Units, Subcutaneous,  Nightly  insulin lispro, 0-14 Units, Subcutaneous, TID AC  insulin lispro, 30 Units, Subcutaneous, TID With Meals  lisinopril, 40 mg, Oral, Daily      Continuous Infusions:   PRN Meds:.•  acetaminophen  •  benzonatate  •  dextrose  •  dextrose  •  diphenhydrAMINE  •  glucagon (human recombinant)    Assessment/Plan   Assessment & Plan     Active Hospital Problems    Diagnosis  POA   • **Pneumonia due to COVID-19 virus [U07.1, J12.82]  Yes   • Hypoxia [R09.02]  Unknown   • Diabetes mellitus, type II (CMS/HCC) [E11.9]  Unknown   • Thrombocytopenia (CMS/[facility]) [D69.6]  Unknown   • Essential hypertension [I10]  Yes      Resolved Hospital Problems   No resolved problems to display.        Brief Hospital Course to date:  Flip Yu II is a 66 y.o. male with history of obesity, unvaccinated for COVID-19 who presented to the ER with shortness of breath for 3 days.  He was found to have COVID-19 and hypoxia in the ER.  He was admitted to Hospital Medicine for Acute Hypoxia due to COVID-19.    This patient's problems and plans were partially entered by my partner and updated as appropriate by me 09/17/21.    Assessment/plan:  Patient is new to me today    COVID 19   Hypoxia   --confirmed covid 19+ 9/08- continue isolation for 20 days following symptom onset.  Covid isolation through 9/28/2021  --has now completed 5 day course of remdesivir, Decadron at 6mg qday for completion of 10 day course  --ID is following- patient refused actemera  --imaging reviewed, hold on abx as do not favor superimposed bacterial PNA  --wean oxygen as tolerated- continues to require 3.5LNC- could consider d/c once weaned to 3-4L consistently   --trend covid progression labs  --albuterol MDI, antitussive, antiemetics as needed  --Lovenox for DVT ppx  --continue airborne/contact precautions     Hypertension  -My partner prior increased lisinopril back to home dose- BP trending up      Uncontrolled Diabetes  - hemoglobin A1C ~ 11.2  - BG slightly  improved but still running on the high side.  Running on average 200s range.  - Diabetes educator has evaluated   - BS better today, no changes today as suspect will improve further with steroid dose weaning  - per DM educator d/w patient's wife, he was taking glipizide, metformin and tradjenta prior to admission- will need to continue these and have very close f/u with PCP (Dr Theodore)     Obesity  - risk factor in COVID-19 disease    DVT prophylaxis:  Medical DVT prophylaxis orders are present.        Disposition: I expect the patient to be discharged pending ability to wean oxygen.  Hopefully home as soon as tomorrow.    CODE STATUS:   Code Status and Medical Interventions:   Ordered at: 09/08/21 4674     Level Of Support Discussed With:    Patient     Code Status:    CPR     Medical Interventions (Level of Support Prior to Arrest):    Full       Rosa Elena Sexton, APRN  09/17/21

## 2021-09-17 NOTE — PROGRESS NOTES
"INFECTIOUS DISEASE Progress note     Flip Yu II  1955  [id number]    Admission Date: 9/8/2021    Requesting Provider: Fede  Evaluating Physician: Jose Martin    Reason for Consultation: COVID 19 pneumonia     History of present illness:    Patient is a 66 y.o. male, unvaccinated, PMH HTN, seen today for COVID 19 pneumonia. He presented to the ED with complaints of nasal congestion, cough over the past several weeks.  Oxygen saturation of 89% ini the ED.  CXR with subtle faint bilateral infiltrates, CTA chest with multifocal parenchymal opacities compatible with multifocal pneumonia. Admitting labs with WBC 7.36, PCT 0.10, CRP 4.34, ferritin 1738,  And he has been afebrile.   He is now on 45L70% FIO2 HFNC.  He was started on Remdesivir and Dexamethasone and we were consulted for evaluated and treatment. Feels \"like crap\"    9/12/21: he remains afebrile.  He remains on HFNC 45L/65% FIO2.  CRP improved. Feeling better today.     9/13/21: Afebrile.  Relatively comfortable.  Complains about being unable to get much sleep in the hospital due to interruptions.  Still has some cough. 60% FIO2.     9/14/21: resting comfortably. Afebrile. Down to 6L O2. Pt non-cooperative with staff.     9/15/21: Resting comfortably watching TV.  No new complaints per patient.  Improving cough and dyspnea.  Down to 5 L of oxygen    9/17/21: resting comfortably in bed off O2, currently 89-92% on room air. Improving cough, dyspnea. Appetite good. No other new concerns. He says he will refuse to go home on O2.     ROS:  No fevers or chills. No n/v/d. No rash. No new ADR to Abx.       No Known Allergies      Medication:    Current Facility-Administered Medications:   •  acetaminophen (TYLENOL) tablet 650 mg, 650 mg, Oral, Q6H PRN, Sindy Ceja, DO  •  albuterol sulfate HFA (PROVENTIL HFA;VENTOLIN HFA;PROAIR HFA) inhaler 2 puff, 2 puff, Inhalation, 4x Daily - RT, Sindy Ceja DO, 2 puff at 09/16/21 2002  •  benzonatate " (TESSALON) capsule 200 mg, 200 mg, Oral, TID PRN, Marcial, Sindy G, DO, 200 mg at 09/13/21 2036  •  dexamethasone (DECADRON) tablet 6 mg, 6 mg, Oral, Daily With Breakfast, Dylan Philippe MD, 6 mg at 09/17/21 0847  •  dextromethorphan polistirex ER (DELSYM) 30 MG/5ML oral suspension 60 mg, 60 mg, Oral, Q12H, Marcial, Sindy G, DO, 60 mg at 09/14/21 0845  •  dextrose (D50W) 25 g/ 50mL Intravenous Solution 25 g, 25 g, Intravenous, Q15 Min PRN, Zev Mistry MD  •  dextrose (GLUTOSE) oral gel 15 g, 15 g, Oral, Q15 Min PRN, Zev Mistry MD  •  diphenhydrAMINE (BENADRYL) capsule 25 mg, 25 mg, Oral, Nightly PRN, Zev Mistry MD, 25 mg at 09/14/21 2201  •  enoxaparin (LOVENOX) syringe 40 mg, 40 mg, Subcutaneous, Q24H, Emily, Judy, APRN, 40 mg at 09/17/21 0848  •  glucagon (human recombinant) (GLUCAGEN DIAGNOSTIC) injection 1 mg, 1 mg, Subcutaneous, Q15 Min PRN, Zev Mistry MD  •  insulin detemir (LEVEMIR) injection 30 Units, 30 Units, Subcutaneous, Nightly, Zev Mistry MD, 30 Units at 09/16/21 2053  •  insulin lispro (humaLOG) injection 0-14 Units, 0-14 Units, Subcutaneous, TID AC, Zev Mistry MD, 3 Units at 09/17/21 0848  •  insulin lispro (humaLOG) injection 30 Units, 30 Units, Subcutaneous, TID With Meals, Zev Mistry MD, 30 Units at 09/17/21 0847  •  lisinopril (PRINIVIL,ZESTRIL) tablet 40 mg, 40 mg, Oral, Daily, Alysa Quiros MD, 40 mg at 09/17/21 0839    Antibiotics:  Anti-Infectives (From admission, onward)    Ordered     Dose/Rate Route Frequency Start Stop    09/09/21 0022  remdesivir 100 mg in sodium chloride 0.9 % 270 mL IVPB (powder vial)     Ordering Provider: Judy Diaz APRN    100 mg  over 60 Minutes Intravenous Every 24 Hours 09/10/21 0900 09/13/21 0916    09/09/21 0022  remdesivir 200 mg in sodium chloride 0.9 % 290 mL IVPB (powder vial)     Ordering Provider: Judy Diaz APRN    200 mg  over 60 Minutes Intravenous Every 24 Hours 09/09/21 0230 09/09/21 0257            Physical Exam:   Vital Signs  Temp (24hrs), Av.1 °F (36.7 °C), Min:97.8 °F (36.6 °C), Max:98.3 °F (36.8 °C)    Temp  Min: 97.8 °F (36.6 °C)  Max: 98.3 °F (36.8 °C)  BP  Min: 121/69  Max: 155/88  Pulse  Min: 49  Max: 71  Resp  Min: 16  Max: 18  SpO2  Min: 90 %  Max: 97 %    Due to the COVID-19 pandemic, and in an effort to preserve PPE, I did not directly examine the patient but directly visualized him through the window and spoke to him via telephone.    GENERAL: Awake and alert, comfortable   HEENT: Normocephalic, atraumatic   HEART: RRR on monitor  LUNGS: normal respiratory effort. Off O2. No cough noted  ABDOMEN: , nondistended. Non-tender  EXT:  No edema.   :  Without Rivas catheter.  SKIN: no rash on visible skin  NEURO: Oriented to PPT..  PSYCHIATRIC: Normal insight and judgement. Cooperative with PE    Laboratory Data    Results from last 7 days   Lab Units 21  0645 21  0618 21  0525   WBC 10*3/mm3 10.85* 10.34 9.27   HEMOGLOBIN g/dL 14.9 14.9 14.4   HEMATOCRIT % 44.3 42.9 42.9   PLATELETS 10*3/mm3 317 287 238     Results from last 7 days   Lab Units 21  0645   SODIUM mmol/L 137   POTASSIUM mmol/L 4.2   CHLORIDE mmol/L 100   CO2 mmol/L 29.0   BUN mg/dL 25*   CREATININE mg/dL 0.81   GLUCOSE mg/dL 215*   CALCIUM mg/dL 8.9     Results from last 7 days   Lab Units 21  0645   ALK PHOS U/L 81   BILIRUBIN mg/dL 0.6   ALT (SGPT) U/L 34   AST (SGOT) U/L 18     Results from last 7 days   Lab Units 21  0618   SED RATE mm/hr 12     Results from last 7 days   Lab Units 21  0618   CRP mg/dL <0.30                 Estimated Creatinine Clearance: 110.2 mL/min (by C-G formula based on SCr of 0.81 mg/dL).      Microbiology:   blood cx pending, so far neg      Radiology:  Imaging Results (Last 72 Hours)     ** No results found for the last 72 hours. **           Impression:   - Severe COVID 19 pneumonia, with multifocal parenchymal opacities by CTA chest, CRP 4, now  improved   - Acute hypoxic respiratory failure, now on HFNC: Negative procalcitonin. Unlikely bacterial coinfection. CTA negative for PE  - HTN  - poorly controlled DM2: A1c 11.2    PLAN/RECOMMENDATIONS:   - Continue Dexamethasone.  Tapered to 6 mg daily to complete 10 total days.   - completed remdesivir on 9/13  - Pt refused toculizumab  - Covid isolation for 20 days from symptom onset  - DVT ppx while inpatient.   - wean O2 as tolerated    Off O2 and comfortable at rest at the time of my visit. Ok to discharge home. Offered walk testing to see if he would qualify for home O2 with activity, but patient refused.     Recommend COVID-19 vaccination 4-6 weeks after discharge.     Signing off. Call if any new concerns develop     Dylan Philippe MD

## 2021-09-17 NOTE — CASE MANAGEMENT/SOCIAL WORK
Continued Stay Note  [facility]     Patient Name: Flip Yu II  MRN: [id number]  Today's Date: 9/17/2021    Admit Date: 9/8/2021    Discharge Plan     Row Name 09/17/21 1119       Plan    Plan  Home    Patient/Family in Agreement with Plan  yes    Plan Comments  States he will not go home with O2. He remains on 3.5L O2 NC with O2 sat at 90-91%. May dc tomorrow if O2 weaned.    Final Discharge Disposition Code  01 - home or self-care        Discharge Codes    No documentation.             Lucia Velez RN

## 2021-09-18 LAB
GLUCOSE BLDC GLUCOMTR-MCNC: 218 MG/DL (ref 70–130)
GLUCOSE BLDC GLUCOMTR-MCNC: 219 MG/DL (ref 70–130)
GLUCOSE BLDC GLUCOMTR-MCNC: 279 MG/DL (ref 70–130)

## 2021-09-18 PROCEDURE — 25010000002 ENOXAPARIN PER 10 MG: Performed by: NURSE PRACTITIONER

## 2021-09-18 PROCEDURE — 99232 SBSQ HOSP IP/OBS MODERATE 35: CPT | Performed by: NURSE PRACTITIONER

## 2021-09-18 PROCEDURE — 63710000001 DIPHENHYDRAMINE PER 50 MG: Performed by: HOSPITALIST

## 2021-09-18 PROCEDURE — 63710000001 INSULIN LISPRO (HUMAN) PER 5 UNITS: Performed by: HOSPITALIST

## 2021-09-18 PROCEDURE — 63710000001 DEXAMETHASONE PER 0.25 MG: Performed by: INTERNAL MEDICINE

## 2021-09-18 PROCEDURE — 63710000001 INSULIN DETEMIR PER 5 UNITS: Performed by: HOSPITALIST

## 2021-09-18 PROCEDURE — 94799 UNLISTED PULMONARY SVC/PX: CPT

## 2021-09-18 PROCEDURE — 82962 GLUCOSE BLOOD TEST: CPT

## 2021-09-18 RX ADMIN — INSULIN LISPRO 30 UNITS: 100 INJECTION, SOLUTION INTRAVENOUS; SUBCUTANEOUS at 09:49

## 2021-09-18 RX ADMIN — INSULIN LISPRO 5 UNITS: 100 INJECTION, SOLUTION INTRAVENOUS; SUBCUTANEOUS at 17:18

## 2021-09-18 RX ADMIN — ENOXAPARIN SODIUM 40 MG: 40 INJECTION SUBCUTANEOUS at 09:48

## 2021-09-18 RX ADMIN — INSULIN LISPRO 30 UNITS: 100 INJECTION, SOLUTION INTRAVENOUS; SUBCUTANEOUS at 17:19

## 2021-09-18 RX ADMIN — INSULIN LISPRO 8 UNITS: 100 INJECTION, SOLUTION INTRAVENOUS; SUBCUTANEOUS at 12:42

## 2021-09-18 RX ADMIN — INSULIN DETEMIR 30 UNITS: 100 INJECTION, SOLUTION SUBCUTANEOUS at 21:33

## 2021-09-18 RX ADMIN — DIPHENHYDRAMINE HYDROCHLORIDE 25 MG: 25 CAPSULE ORAL at 21:45

## 2021-09-18 RX ADMIN — ALBUTEROL SULFATE 2 PUFF: 90 AEROSOL, METERED RESPIRATORY (INHALATION) at 20:31

## 2021-09-18 RX ADMIN — DEXAMETHASONE 6 MG: 2 TABLET ORAL at 09:46

## 2021-09-18 RX ADMIN — INSULIN LISPRO 30 UNITS: 100 INJECTION, SOLUTION INTRAVENOUS; SUBCUTANEOUS at 12:42

## 2021-09-18 RX ADMIN — ALBUTEROL SULFATE 2 PUFF: 90 AEROSOL, METERED RESPIRATORY (INHALATION) at 12:25

## 2021-09-18 NOTE — PROGRESS NOTES
[facility] Medicine Services  PROGRESS NOTE    Patient Name: Flip Yu II  : 1955  MRN: [id number]    Date of Admission: 2021  Primary Care Physician: Nico Theodore MD    Subjective   Subjective     CC:  covid 19    HPI:  Resting in bed, no acute distress. Patient is still experiencing low oxygen level when off oxygen (83-87%). Patient is not interested in home oxygen.    ROS:  Gen- No fevers, chills  CV- No chest pain, palpitations  Resp- No cough, dyspnea  GI- No N/V/D, abd pain  All systems reviewed and found negative except what is listed in the HPI/ROS    Objective   Objective     Vital Signs:   Temp:  [97.8 °F (36.6 °C)-98.2 °F (36.8 °C)] 97.9 °F (36.6 °C)  Heart Rate:  [53-87] 68  Resp:  [17-20] 20  BP: ()/() 102/63  Flow (L/min):  [2-4] 2     Physical Exam:  Constitutional: Awake, alert and oriented x3 no acute distress  Eyes: PERRLA, sclerae anicteric, no conjunctival injection  HENT: NCAT, mucous membranes moist  Neck: Supple, no thyromegaly, no lymphadenopathy, trachea midline  Respiratory: Wheezing with diminished sounds in the bases, nonlabored respirations   Cardiovascular: RRR, no murmurs, rubs, or gallops, palpable pedal pulses bilaterally  Gastrointestinal: Positive bowel sounds, soft, nontender, nondistended  Musculoskeletal: No bilateral ankle edema, no clubbing or cyanosis to extremities  Psychiatric: Appropriate affect, cooperative  Neurologic: Oriented x 3, strength symmetric in all extremities, Cranial Nerves grossly intact to confrontation, speech clear  Skin: No rashes    Results Reviewed:  LAB RESULTS:      Lab 21  0645 21  0618 21  0525 21  0542   WBC 10.85* 10.34 9.27 8.28   HEMOGLOBIN 14.9 14.9 14.4 15.5   HEMATOCRIT 44.3 42.9 42.9 46.6   PLATELETS 317 287 238 235   NEUTROS ABS 7.52*  --  6.43 6.05   IMMATURE GRANS (ABS) 0.17*  --  0.06* 0.04   LYMPHS ABS 1.99  --  1.88 1.39   MONOS ABS 1.09*  --  0.88  0.79   EOS ABS 0.06  --  0.01 0.00   MCV 95.5 93.3 93.7 95.1   SED RATE  --  12  --   --    CRP  --  <0.30 0.46 0.82*   LDH  --  228*  --   --          Lab 09/17/21  0645 09/16/21  0618 09/13/21  0525 09/12/21  0542   SODIUM 137 140 138 138   POTASSIUM 4.2 4.3 4.6 4.1   CHLORIDE 100 104 101 102   CO2 29.0 27.0 29.0 24.0   ANION GAP 8.0 9.0 8.0 12.0   BUN 25* 24* 24* 22   CREATININE 0.81 0.78 0.79 0.75*   GLUCOSE 215* 227* 252* 258*   CALCIUM 8.9 8.7 8.9 9.0         Lab 09/17/21  0645 09/13/21  0525 09/12/21  0542   TOTAL PROTEIN 5.9* 6.1 6.7   ALBUMIN 3.10* 3.10* 3.40*   GLOBULIN 2.8 3.0 3.3   ALT (SGPT) 34 21 21   AST (SGOT) 18 20 19   BILIRUBIN 0.6 0.6 0.6   ALK PHOS 81 67 71                 Lab 09/16/21  0618   FERRITIN 867.70*         Brief Urine Lab Results     None          Microbiology Results Abnormal     Procedure Component Value - Date/Time    Blood Culture - Blood, Arm, Left [[id number]] Collected: 09/08/21 2322    Lab Status: Final result Specimen: Blood from Arm, Left Updated: 09/13/21 2345     Blood Culture No growth at 5 days    Blood Culture - Blood, Arm, Right [[id number]] Collected: 09/08/21 2322    Lab Status: Final result Specimen: Blood from Arm, Right Updated: 09/13/21 2345     Blood Culture No growth at 5 days          No radiology results from the last 24 hrs        I have reviewed the medications:  Scheduled Meds:albuterol sulfate HFA, 2 puff, Inhalation, 4x Daily - RT  dexamethasone, 6 mg, Oral, Daily With Breakfast  dextromethorphan polistirex ER, 60 mg, Oral, Q12H  enoxaparin, 40 mg, Subcutaneous, Q24H  insulin detemir, 30 Units, Subcutaneous, Nightly  insulin lispro, 0-14 Units, Subcutaneous, TID AC  insulin lispro, 30 Units, Subcutaneous, TID With Meals  lisinopril, 40 mg, Oral, Daily      Continuous Infusions:   PRN Meds:.•  acetaminophen  •  benzonatate  •  dextrose  •  dextrose  •  diphenhydrAMINE  •  glucagon (human recombinant)    Assessment/Plan   Assessment & Plan     Active  Hospital Problems    Diagnosis  POA   • **Pneumonia due to COVID-19 virus [U07.1, J12.82]  Yes   • Hypoxia [R09.02]  Unknown   • Diabetes mellitus, type II (CMS/HCC) [E11.9]  Unknown   • Thrombocytopenia (CMS/HCC) [D69.6]  Unknown   • Essential hypertension [I10]  Yes      Resolved Hospital Problems   No resolved problems to display.      Brief Hospital Course to date:  Flip Yu II is a 66 y.o. male with history of obesity, unvaccinated for COVID-19 who presented to the ER with shortness of breath for 3 days.  He was found to have COVID-19 and hypoxia in the ER.  He was admitted to Hospital Medicine for Acute Hypoxia due to COVID-19. Patient receiving remdesivir, Decadron.    This patient's problems and plans were partially entered by my partner and updated as appropriate by me 09/18/21.    Assessment/plan:  Patient is new to me today    COVID 19   Hypoxia   --confirmed covid 19+ 9/08- continue isolation for 20 days following symptom onset.  Covid isolation through 9/28/2021  --has now completed 5 day course of remdesivir, Decadron at 6mg qday for completion of 10 day course  --ID is following- patient refused actemera  --imaging reviewed, hold on abx as do not favor superimposed bacterial PNA  --wean oxygen as tolerated- continues to require 3.5LNC- could consider d/c once weaned to 3-4L consistently   --trend covid progression labs  --albuterol MDI, antitussive, antiemetics as needed  --Lovenox for DVT ppx  --continue airborne/contact precautions     Hypertension  -Lisinopril was increased back to home dose of 40 mg daily as BP trended upward.     Uncontrolled Diabetes  - hemoglobin A1C ~ 11.2  - 24 glucose 147-288  - Diabetes educator has evaluated   - BS elevated due to steroid therapy. Expect better control of steroid dose weans.  - per DM educator d/w patient's wife, he was taking glipizide, metformin and tradjenta prior to admission- will need to continue these and have very close f/u with PCP (  Arben)     Obesity  - risk factor in COVID-19 disease    DVT prophylaxis:  Medical DVT prophylaxis orders are present.        Disposition: I expect the patient to be discharged pending ability to wean oxygen.  Hopefully home as soon as tomorrow.    CODE STATUS:   Code Status and Medical Interventions:   Ordered at: 09/08/21 0126     Level Of Support Discussed With:    Patient     Code Status:    CPR     Medical Interventions (Level of Support Prior to Arrest):    Full       Belem Monsivais, APRN  09/18/21

## 2021-09-19 ENCOUNTER — READMISSION MANAGEMENT (OUTPATIENT)
Dept: CALL CENTER | Facility: HOSPITAL | Age: 66
End: 2021-09-19

## 2021-09-19 VITALS
HEIGHT: 74 IN | SYSTOLIC BLOOD PRESSURE: 138 MMHG | TEMPERATURE: 97.7 F | DIASTOLIC BLOOD PRESSURE: 74 MMHG | BODY MASS INDEX: 28.5 KG/M2 | WEIGHT: 222.1 LBS | HEART RATE: 66 BPM | RESPIRATION RATE: 16 BRPM | OXYGEN SATURATION: 90 %

## 2021-09-19 PROBLEM — D89.832 CYTOKINE RELEASE SYNDROME, GRADE 2: Status: RESOLVED | Noted: 2021-09-19 | Resolved: 2021-09-19

## 2021-09-19 PROBLEM — U07.1 PNEUMONIA DUE TO COVID-19 VIRUS: Status: RESOLVED | Noted: 2021-09-08 | Resolved: 2021-09-19

## 2021-09-19 PROBLEM — J12.82 PNEUMONIA DUE TO COVID-19 VIRUS: Status: RESOLVED | Noted: 2021-09-08 | Resolved: 2021-09-19

## 2021-09-19 PROBLEM — R09.02 HYPOXIA: Status: RESOLVED | Noted: 2021-09-09 | Resolved: 2021-09-19

## 2021-09-19 PROBLEM — D89.832 CYTOKINE RELEASE SYNDROME, GRADE 2: Status: ACTIVE | Noted: 2021-09-19

## 2021-09-19 LAB
GLUCOSE BLDC GLUCOMTR-MCNC: 125 MG/DL (ref 70–130)
GLUCOSE BLDC GLUCOMTR-MCNC: 185 MG/DL (ref 70–130)

## 2021-09-19 PROCEDURE — 63710000001 DEXAMETHASONE PER 0.25 MG: Performed by: INTERNAL MEDICINE

## 2021-09-19 PROCEDURE — 63710000001 INSULIN LISPRO (HUMAN) PER 5 UNITS: Performed by: HOSPITALIST

## 2021-09-19 PROCEDURE — 25010000002 ENOXAPARIN PER 10 MG: Performed by: NURSE PRACTITIONER

## 2021-09-19 PROCEDURE — 99239 HOSP IP/OBS DSCHRG MGMT >30: CPT | Performed by: NURSE PRACTITIONER

## 2021-09-19 PROCEDURE — 82962 GLUCOSE BLOOD TEST: CPT

## 2021-09-19 RX ORDER — BENZONATATE 200 MG/1
200 CAPSULE ORAL 3 TIMES DAILY PRN
Qty: 30 CAPSULE | Refills: 0 | Status: SHIPPED | OUTPATIENT
Start: 2021-09-19

## 2021-09-19 RX ORDER — DEXAMETHASONE 6 MG/1
6 TABLET ORAL
Qty: 2 TABLET | Refills: 0 | Status: SHIPPED | OUTPATIENT
Start: 2021-09-20 | End: 2021-09-19

## 2021-09-19 RX ORDER — ACETAMINOPHEN 325 MG/1
650 TABLET ORAL EVERY 6 HOURS PRN
Start: 2021-09-19 | End: 2021-09-19

## 2021-09-19 RX ORDER — DEXAMETHASONE 6 MG/1
6 TABLET ORAL
Qty: 2 TABLET | Refills: 0 | Status: SHIPPED | OUTPATIENT
Start: 2021-09-20 | End: 2021-09-22

## 2021-09-19 RX ORDER — ALBUTEROL SULFATE 90 UG/1
2 AEROSOL, METERED RESPIRATORY (INHALATION) EVERY 4 HOURS PRN
Status: DISCONTINUED | OUTPATIENT
Start: 2021-09-19 | End: 2021-09-19 | Stop reason: HOSPADM

## 2021-09-19 RX ORDER — BENZONATATE 200 MG/1
200 CAPSULE ORAL 3 TIMES DAILY PRN
Qty: 30 CAPSULE | Refills: 0 | Status: SHIPPED | OUTPATIENT
Start: 2021-09-19 | End: 2021-09-19

## 2021-09-19 RX ORDER — ALBUTEROL SULFATE 90 UG/1
2 AEROSOL, METERED RESPIRATORY (INHALATION) EVERY 4 HOURS PRN
Qty: 18 G | Refills: 0 | Status: SHIPPED | OUTPATIENT
Start: 2021-09-19 | End: 2021-09-19

## 2021-09-19 RX ORDER — ACETAMINOPHEN 325 MG/1
650 TABLET ORAL EVERY 6 HOURS PRN
Qty: 120 TABLET | Refills: 0 | Status: SHIPPED | OUTPATIENT
Start: 2021-09-19

## 2021-09-19 RX ORDER — GLIPIZIDE 10 MG/1
10 TABLET ORAL
Qty: 60 TABLET | Refills: 0 | Status: SHIPPED | OUTPATIENT
Start: 2021-09-19

## 2021-09-19 RX ORDER — ALBUTEROL SULFATE 90 UG/1
2 AEROSOL, METERED RESPIRATORY (INHALATION) EVERY 4 HOURS PRN
Qty: 18 G | Refills: 0 | Status: SHIPPED | OUTPATIENT
Start: 2021-09-19

## 2021-09-19 RX ORDER — GLIPIZIDE 10 MG/1
10 TABLET ORAL
Start: 2021-09-19 | End: 2021-09-19 | Stop reason: SDUPTHER

## 2021-09-19 RX ADMIN — DEXAMETHASONE 6 MG: 2 TABLET ORAL at 12:05

## 2021-09-19 RX ADMIN — INSULIN LISPRO 30 UNITS: 100 INJECTION, SOLUTION INTRAVENOUS; SUBCUTANEOUS at 13:08

## 2021-09-19 RX ADMIN — LISINOPRIL 40 MG: 40 TABLET ORAL at 08:18

## 2021-09-19 RX ADMIN — INSULIN LISPRO 30 UNITS: 100 INJECTION, SOLUTION INTRAVENOUS; SUBCUTANEOUS at 08:19

## 2021-09-19 RX ADMIN — ENOXAPARIN SODIUM 40 MG: 40 INJECTION SUBCUTANEOUS at 08:18

## 2021-09-19 RX ADMIN — INSULIN LISPRO 3 UNITS: 100 INJECTION, SOLUTION INTRAVENOUS; SUBCUTANEOUS at 13:08

## 2021-09-19 NOTE — OUTREACH NOTE
Prep Survey      Responses   [facility] facility patient discharged from?  [facility]   Is LACE score < 7 ?  No   Emergency Room discharge w/ pulse ox?  No   Eligibility  Readm Mgmt   Discharge diagnosis  Hypoxia & PNA d/t COVID-19, thrombocytopenia, T2DM   Does the patient have one of the following disease processes/diagnoses(primary or secondary)?  COVID-19   Does the patient have Home health ordered?  No   Is there a DME ordered?  No   Prep survey completed?  Yes          Zenia Alcantara RN

## 2021-09-19 NOTE — DISCHARGE SUMMARY
[facility] Medicine Services  DISCHARGE SUMMARY    Patient Name: Flip Yu II  : 1955  MRN: [id number]    Date of Admission: 2021  Date of Discharge:  2021  Primary Care Physician: Nico Theodore MD    Consults     Date and Time Order Name Status Description    2021 12:34 AM Inpatient Infectious Diseases Consult Completed         Hospital Course     Presenting Problem:   Pneumonia due to COVID-19 virus [U07.1, J12.82]    Active Hospital Problems    Diagnosis  POA   • Diabetes mellitus, type II (CMS/HCC) [E11.9]  Yes   • Essential hypertension [I10]  Yes      Resolved Hospital Problems    Diagnosis Date Resolved POA   • **Pneumonia due to COVID-19 virus [U07.1, J12.82] 2021 Yes   • Cytokine release syndrome, grade 2 [D89.832] 2021 Yes   • Hypoxia [R09.02] 2021 Unknown   • Thrombocytopenia (CMS/HCC) [D69.6] 2021 Unknown      Hospital Course:  Flip Yu II is a 66 y.o. male history of obesity, DMII, HTN unvaccinated for COVID-19 who presented to the ER with shortness of breath for 3 days.  He was found to have COVID-19 and hypoxia in the ER.  He was admitted to Hospital Medicine for Acute Hypoxia due to COVID-19. Patient received remdesivir, Decadron with improvement of symptoms.  At discharge, oxygen saturation at room air is 90-94%.  During hospitalization, patient found to have uncontrolled diabetes with an A1c of 11.2.  Patient does not have a glucometer so he was discharged with a glucometer.  Steroid therapy required for Covid treatment elevated glucose requiring insulin.  He will be discharged on glipizide, Metformin, Tradjenta and follow-up with his PCP next week. Blood Pressure remained stable during hospitalization.  ID recommends COVID-19 vaccination 4 to 6 weeks after discharge.     Discharge Follow Up Recommendations for labs/diagnostics:  -Follow-up with PCP in 1 week  -Monitor and record glucose daily  -Recommend  COVID-19 vaccination in 4 to 6 weeks  -Patient will remain in isolation until 09/28/2021    Day of Discharge     HPI:   Patient sitting up in bed, alert and oriented x3, pleasant, talkative.  Patient denies shortness of breath.  Oxygen saturation on room air is between 90 and 94%.  No issues overnight    Review of Systems  Gen- No fevers, chills  CV- No chest pain, palpitations  Resp- (+) occasional cough,(-) dyspnea  GI- No N/V/D, abd pain  Otherwise ROS is negative except as mentioned in the HPI.    Vital Signs:   Temp:  [97.7 °F (36.5 °C)-98.7 °F (37.1 °C)] 97.7 °F (36.5 °C)  Heart Rate:  [57-80] 57  Resp:  [16-18] 16  BP: ()/(63-96) 136/96     Physical Exam:  Constitutional: Awake, alert, NAD, appears younger than stated age  Eyes: PERRLA, sclerae anicteric, no conjunctival injection  HENT: NCAT, mucous membranes moist  Neck: Supple, no thyromegaly, no lymphadenopathy, trachea midline  Respiratory: Clear to auscultation bilaterally, nonlabored respirations   Cardiovascular: RRR, no murmurs, rubs, or gallops, palpable pedal pulses bilaterally  Gastrointestinal: Positive bowel sounds, soft, nontender, nondistended  Musculoskeletal: No bilateral ankle edema, no clubbing or cyanosis to extremities  Psychiatric: Appropriate affect, cooperative  Neurologic: Oriented x 3, strength symmetric in all extremities, Cranial Nerves grossly intact to confrontation, speech clear  Skin: No rashes    Assessment/plan  COVID 19   Hypoxia -resolved  --confirmed covid 19+ 9/08- continue isolation for 20 days following symptom onset.  Covid isolation through 9/28/2021  --has now completed 5 day course of remdesivir, Decadron at 6mg qday for completion of 10 day course  --ID is following- patient refused actemera  --imaging reviewed, hold on abx as do not favor superimposed bacterial PNA     Hypertension-stable  -Lisinopril was increased back to home dose of 40 mg daily as BP trended upward.     Uncontrolled Diabetes  -  hemoglobin A1C ~ 11.2  - 24 glucose 147-288  - Diabetes educator has evaluated   - BS elevated due to steroid therapy. Expect better control of steroid dose weans.  - per DM educator d/w patient's wife, he was taking glipizide, metformin and tradjenta prior to admission- will need to continue these and have very close f/u with PCP (Dr Theodore)     Obesity-ongoing  - risk factor in COVID-19 disease    Pertinent  and/or Most Recent Results     Results from last 7 days   Lab Units 09/17/21  0645 09/16/21  0618 09/13/21  0525   WBC 10*3/mm3 10.85* 10.34 9.27   HEMOGLOBIN g/dL 14.9 14.9 14.4   HEMATOCRIT % 44.3 42.9 42.9   PLATELETS 10*3/mm3 317 287 238   SODIUM mmol/L 137 140 138   POTASSIUM mmol/L 4.2 4.3 4.6   CHLORIDE mmol/L 100 104 101   CO2 mmol/L 29.0 27.0 29.0   BUN mg/dL 25* 24* 24*   CREATININE mg/dL 0.81 0.78 0.79   GLUCOSE mg/dL 215* 227* 252*   CALCIUM mg/dL 8.9 8.7 8.9     Results from last 7 days   Lab Units 09/17/21  0645 09/13/21  0525   BILIRUBIN mg/dL 0.6 0.6   ALK PHOS U/L 81 67   ALT (SGPT) U/L 34 21   AST (SGOT) U/L 18 20     Brief Urine Lab Results     None        Microbiology Results Abnormal     Procedure Component Value - Date/Time    Blood Culture - Blood, Arm, Left [[id number]] Collected: 09/08/21 2322    Lab Status: Final result Specimen: Blood from Arm, Left Updated: 09/13/21 2345     Blood Culture No growth at 5 days    Blood Culture - Blood, Arm, Right [[id number]] Collected: 09/08/21 2322    Lab Status: Final result Specimen: Blood from Arm, Right Updated: 09/13/21 2345     Blood Culture No growth at 5 days          Imaging Results (All)     Procedure Component Value Units Date/Time    CT Angiogram Chest [[id number]] Collected: 09/09/21 0005     Updated: 09/09/21 0007    Narrative:      CTA Chest    INDICATION:   Elevated d-dimer. Covid pneumonia.    TECHNIQUE:   CT angiogram of the chest with IV contrast. 3-D reconstructions were obtained and reviewed.   Radiation dose reduction  techniques included automated exposure control or exposure modulation based on body size. Count of known CT and cardiac nuc med studies  performed in previous 12 months: 0.     COMPARISON:   None available.    FINDINGS:   Multifocal parenchymal opacities throughout both lungs most prominent within the dependent and lower lung zones. Findings compatible with multifocal pneumonitis and atelectasis. No effusions. Trachea and bronchi unremarkable.    No evidence of pulmonary embolus. Heart size within normal limits. Aorta free of dissection or aneurysm. Solitary gallstone without evidence of acute cholecystitis.      Impression:      Multifocal parenchymal opacities most compatible with multifocal pneumonia with some superimposed atelectasis. Commonly reported imaging features of COVID-19 pneumonia are present. Other processes such as influenza pneumonia and organizing pneumonia can  cause a similar imaging pattern.    No evidence of pulmonary embolus.    Signer Name: OMAR Alcantara MD   Signed: 9/9/2021 12:05 AM   Workstation [facility]: [facility]    [facility]    XR Chest 1 View [[id number]] Collected: 09/08/21 2112     Updated: 09/08/21 2114    Narrative:      CR Chest 1 Vw    INDICATION:   Congestion tonight     COMPARISON:    None available.    FINDINGS:  Portable AP view(s) of the chest.  Size and vascular normal. There are faint patchy infiltrates bilaterally greater on the right side than the left. The bones are normal      Impression:      Subtle faint bilateral infiltrates suggesting Covid 19 pneumonia. Clinical correlation is recommended    Signer Name: Ze Centeno MD   Signed: 9/8/2021 9:12 PM   Workstation Name: [facility]    [facility]                        Discharge Details        Discharge Medications      New Medications      Instructions Start Date   acetaminophen 325 MG tablet  Commonly known as: TYLENOL   650 mg, Oral, Every 6 Hours PRN      albuterol  sulfate  (90 Base) MCG/ACT inhaler  Commonly known as: PROVENTIL HFA;VENTOLIN HFA;PROAIR HFA   2 puffs, Inhalation, Every 4 Hours PRN      benzonatate 200 MG capsule  Commonly known as: TESSALON   200 mg, Oral, 3 Times Daily PRN      dexamethasone 6 MG tablet  Commonly known as: DECADRON   6 mg, Oral, Daily With Breakfast   Start Date: September 20, 2021     glipizide 10 MG tablet  Commonly known as: Glucotrol   10 mg, Oral, 2 Times Daily Before Meals      glucose blood test strip   Use as instructed      glucose monitoring kit monitoring kit   1 each, Does not apply, As Needed      linagliptin 5 MG tablet tablet  Commonly known as: Tradjenta   5 mg, Oral, Daily      metFORMIN 500 MG tablet  Commonly known as: Glucophage   1,000 mg, Oral, 2 Times Daily With Meals         Continue These Medications      Instructions Start Date   lisinopril 40 MG tablet  Commonly known as: PRINIVIL,ZESTRIL   40 mg, Oral, Daily             No Known Allergies      Discharge Disposition:  Home or Self Care    Discharge Diet:  Diet Order   Procedures   • Diet Regular; Cardiac, Consistent Carbohydrate         Discharge Activity:   Activity Instructions     Activity as Tolerated              CODE STATUS:    Code Status and Medical Interventions:   Ordered at: 09/08/21 2691     Level Of Support Discussed With:    Patient     Code Status:    CPR     Medical Interventions (Level of Support Prior to Arrest):    Full         No future appointments.    Additional Instructions for the Follow-ups that You Need to Schedule     Call MD With Problems / Concerns   As directed      Instructions:    Order Comments: Instructions:          Call MD With Problems / Concerns   As directed      Instructions: Call MD for temperature greater than 100.4, nausea, vomiting, diarrhea, chest pain, heart palpitations, shortness of breath    Order Comments: Instructions: Call MD for temperature greater than 100.4, nausea, vomiting, diarrhea, chest pain, heart  palpitations, shortness of breath          Discharge Follow-up with PCP   As directed       Currently Documented PCP:    Nico Theodore MD    PCP Phone Number:    [phone]     Follow Up Details: F/U with PCP in 1 week         Discharge Follow-up with PCP   As directed       Currently Documented PCP:    Nico Theodore MD    PCP Phone Number:    [phone]     Follow Up Details: Follow-up with PCP in 1 week               Time Spent on Discharge:  45 minutes    Electronically signed by WILFREDO Champion, 09/19/21, 9:23 AM EDT.

## 2021-09-20 ENCOUNTER — READMISSION MANAGEMENT (OUTPATIENT)
Dept: CALL CENTER | Facility: HOSPITAL | Age: 66
End: 2021-09-20

## 2021-09-20 NOTE — OUTREACH NOTE
COVID-19 Week 1 Survey      Responses   [facility] patient discharged from?  [place]   Does the patient have one of the following disease processes/diagnoses(primary or secondary)?  COVID-19   COVID-19 underlying condition?  None   Call Number  Call 1   Week 1 Call successful?  No   Discharge diagnosis  Hypoxia & PNA d/t COVID-19, thrombocytopenia, T2DM          Mavis Harding RN

## 2021-09-21 ENCOUNTER — READMISSION MANAGEMENT (OUTPATIENT)
Dept: CALL CENTER | Facility: HOSPITAL | Age: 66
End: 2021-09-21

## 2021-09-21 NOTE — OUTREACH NOTE
COVID-19 Week 1 Survey      Responses   [facility] [place] patient discharged from?  [place]   Does the patient have one of the following disease processes/diagnoses(primary or secondary)?  COVID-19   COVID-19 underlying condition?  None   Call Number  Call 2   Week 1 Call successful?  No   Discharge diagnosis  Hypoxia & PNA d/t COVID-19, thrombocytopenia, T2DM          Janna Matthews, RN

## 2021-09-22 ENCOUNTER — READMISSION MANAGEMENT (OUTPATIENT)
Dept: CALL CENTER | Facility: HOSPITAL | Age: 66
End: 2021-09-22

## 2021-09-22 NOTE — OUTREACH NOTE
COVID-19 Week 1 Survey      Responses   [facility] patient discharged from?  [place]   Does the patient have one of the following disease processes/diagnoses(primary or secondary)?  COVID-19   COVID-19 underlying condition?  None   Call Number  Call 3   Week 1 Call successful?  Yes   Call start time  1136   Call end time  1141   Discharge diagnosis  Hypoxia & PNA d/t COVID-19, thrombocytopenia, T2DM   Meds reviewed with patient/caregiver?  Yes   Is the patient having any side effects they believe may be caused by any medication additions or changes?  No   Does the patient have all medications ordered at discharge?  Yes   Is the patient taking all medications as directed (includes completed medication regime)?  Yes   Does the patient have a primary care provider?   Yes   Comments regarding PCP  Waiting on return call   Has home health visited the patient within 72 hours of discharge?  N/A   Has all DME been delivered?  No   Psychosocial issues?  No   Did the patient receive a copy of their discharge instructions?  Yes   Did the patient receive a copy of COVID-19 specific instructions?  Yes   Nursing interventions  Reviewed instructions with patient   What is the patient's perception of their health status since discharge?  Improving   Does the patient have any of the following symptoms?  None   Nursing Interventions  Nurse provided patient education   Pulse Ox monitoring  None   Is the patient/caregiver able to teach back steps to recovery at home?  Rest and rebuild strength, gradually increase activity, Set small, achievable goals for return to baseline health, Make a list of questions for provider's appointment, Practice good oral hygiene   If the patient is a current smoker, are they able to teach back resources for cessation?  Not a smoker   Is the patient/caregiver able to teach back the hierarchy of who to call/visit for symptoms/problems? PCP, Specialist, Home health nurse, Urgent Care, ED, 911  Yes    COVID-19 call completed?  Yes   Wrap up additional comments  States he is doing well. Called PCP office for FU waiting for a call back. No needs identified.           Anna Dawson RN

## 2021-09-27 ENCOUNTER — READMISSION MANAGEMENT (OUTPATIENT)
Dept: CALL CENTER | Facility: HOSPITAL | Age: 66
End: 2021-09-27

## 2021-09-27 NOTE — OUTREACH NOTE
COVID-19 Week 2 Survey      Responses   [facility] patient discharged from?  [place]   Does the patient have one of the following disease processes/diagnoses(primary or secondary)?  COVID-19   COVID-19 underlying condition?  None   Call Number  Call 1   COVID-19 Week 2: Call 1 attempt successful?  Yes   Call start time  0946   Call end time  0951   Discharge diagnosis  Hypoxia & PNA d/t COVID-19, thrombocytopenia, T2DM   Meds reviewed with patient/caregiver?  Yes   Is the patient having any side effects they believe may be caused by any medication additions or changes?  No   Does the patient have all medications ordered at discharge?  Yes   Is the patient taking all medications as directed (includes completed medication regime)?  Yes   Does the patient have a primary care provider?   Yes   Does the patient have an appointment with their PCP or specialist within 7 days of discharge?  Yes   Has the patient kept scheduled appointments due by today?  N/A   Has home health visited the patient within 72 hours of discharge?  N/A   Psychosocial issues?  No   Did the patient receive a copy of their discharge instructions?  Yes   Did the patient receive a copy of COVID-19 specific instructions?  Yes   Nursing interventions  Reviewed instructions with patient   What is the patient's perception of their health status since discharge?  Improving   Does the patient have any of the following symptoms?  None   Nursing Interventions  Nurse provided patient education   Pulse Ox monitoring  None   Is the patient/caregiver able to teach back steps to recovery at home?  Set small, achievable goals for return to baseline health, Rest and rebuild strength, gradually increase activity, Eat a well-balance diet, Make a list of questions for provider's appointment   Is the patient/caregiver able to teach back the hierarchy of who to call/visit for symptoms/problems? PCP, Specialist, Home health nurse, Urgent Care, ED, 911  Yes    COVID-19 call completed?  Yes          Maria Salinas RN

## 2021-10-04 ENCOUNTER — READMISSION MANAGEMENT (OUTPATIENT)
Dept: CALL CENTER | Facility: HOSPITAL | Age: 66
End: 2021-10-04

## 2021-10-04 NOTE — OUTREACH NOTE
COVID-19 Week 3 Survey      Responses   [facility] patient discharged from?  [place]   Does the patient have one of the following disease processes/diagnoses(primary or secondary)?  COVID-19   COVID-19 underlying condition?  None   Call Number  Call 1   COVID-19 Week 3: Call 1 attempt successful?  Yes   Call start time  1115   Call end time  1117   Discharge diagnosis  Hypoxia & PNA d/t COVID-19, thrombocytopenia, T2DM   Person spoke with today (if not patient) and relationship  Leena-spouse    Meds reviewed with patient/caregiver?  Yes   Is the patient taking all medications as directed (includes completed medication regime)?  Yes   Comments regarding PCP  Pt has followed up with PCP since hospital d/c on 9/19/21   Has the patient kept scheduled appointments due by today?  Yes   What is the patient's perception of their health status since discharge?  Improving   Does the patient have any of the following symptoms?  None   Pulse Ox monitoring  None   COVID-19 call completed?  Yes   Revoked  No further contact(revokes)-requires comment   Is the patient interested in additional calls from an ambulatory ?  NOTE:  applies to high risk patients requiring additional follow-up.  No   Graduated/Revoked comments  Wife reports patient is doing well          Michelle Torres RN

## 2023-08-16 ENCOUNTER — TRANSCRIBE ORDERS (OUTPATIENT)
Dept: ADMINISTRATIVE | Facility: HOSPITAL | Age: 68
End: 2023-08-16
Payer: MEDICARE

## 2023-08-16 ENCOUNTER — HOSPITAL ENCOUNTER (OUTPATIENT)
Dept: GENERAL RADIOLOGY | Facility: HOSPITAL | Age: 68
Discharge: HOME | End: 2023-08-16
Admitting: INTERNAL MEDICINE
Payer: MEDICARE

## 2023-08-16 DIAGNOSIS — R07.81 RIB PAIN ON LEFT SIDE: Primary | ICD-10-CM

## 2023-08-16 DIAGNOSIS — R07.81 RIB PAIN ON LEFT SIDE: ICD-10-CM

## 2023-08-16 PROCEDURE — 71101 X-RAY EXAM UNILAT RIBS/CHEST: CPT

## 2023-08-20 ENCOUNTER — HOSPITAL ENCOUNTER (EMERGENCY)
Facility: HOSPITAL | Age: 68
Discharge: HOME | End: 2023-08-20
Attending: EMERGENCY MEDICINE
Payer: COMMERCIAL

## 2023-08-20 ENCOUNTER — APPOINTMENT (OUTPATIENT)
Dept: GENERAL RADIOLOGY | Facility: HOSPITAL | Age: 68
End: 2023-08-20
Payer: COMMERCIAL

## 2023-08-20 VITALS
HEART RATE: 97 BPM | SYSTOLIC BLOOD PRESSURE: 168 MMHG | RESPIRATION RATE: 18 BRPM | WEIGHT: 236 LBS | TEMPERATURE: 98.2 F | BODY MASS INDEX: 30.29 KG/M2 | HEIGHT: 74 IN | OXYGEN SATURATION: 93 % | DIASTOLIC BLOOD PRESSURE: 109 MMHG

## 2023-08-20 DIAGNOSIS — S22.42XA CLOSED FRACTURE OF MULTIPLE RIBS OF LEFT SIDE, INITIAL ENCOUNTER: ICD-10-CM
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
## 2023-08-20 PROCEDURE — 99282 EMERGENCY DEPT VISIT SF MDM: CPT

## 2023-08-20 PROCEDURE — 71101 X-RAY EXAM UNILAT RIBS/CHEST: CPT

## 2023-08-20 RX ORDER — PRAVASTATIN SODIUM 20 MG
TABLET ORAL
COMMUNITY
Start: 2023-08-09
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
## 2024-09-19 ENCOUNTER — OFFICE VISIT (OUTPATIENT)
Dept: SLEEP MEDICINE | Facility: CLINIC | Age: 69
End: 2024-09-19
Payer: MEDICARE

## 2024-09-19 VITALS
HEART RATE: 72 BPM | TEMPERATURE: 97 F | OXYGEN SATURATION: 96 % | SYSTOLIC BLOOD PRESSURE: 150 MMHG | DIASTOLIC BLOOD PRESSURE: 92 MMHG | HEIGHT: 74 IN | WEIGHT: 240 LBS | BODY MASS INDEX: 30.8 KG/M2

## 2024-09-19 DIAGNOSIS — R06.83 SNORING: Primary | ICD-10-CM

## 2024-09-19 DIAGNOSIS — G47.10 HYPERSOMNOLENCE: ICD-10-CM

## 2024-09-19 DIAGNOSIS — Z72.821 POOR SLEEP HYGIENE: ICD-10-CM

## 2024-09-19 DIAGNOSIS — G47.33 OSA (OBSTRUCTIVE SLEEP APNEA): ICD-10-CM

## 2024-09-19 DIAGNOSIS — E66.9 OBESITY (BMI 30-39.9): ICD-10-CM

## 2025-03-16 ENCOUNTER — HOSPITAL ENCOUNTER (EMERGENCY)
Facility: HOSPITAL | Age: 70
Discharge: HOME | End: 2025-03-16
Attending: STUDENT IN AN ORGANIZED HEALTH CARE EDUCATION/TRAINING PROGRAM | Admitting: STUDENT IN AN ORGANIZED HEALTH CARE EDUCATION/TRAINING PROGRAM
Payer: MEDICARE

## 2025-03-16 ENCOUNTER — APPOINTMENT (OUTPATIENT)
Facility: HOSPITAL | Age: 70
End: 2025-03-16
Payer: MEDICARE

## 2025-03-16 VITALS
WEIGHT: 235 LBS | DIASTOLIC BLOOD PRESSURE: 90 MMHG | TEMPERATURE: 97.8 F | OXYGEN SATURATION: 92 % | RESPIRATION RATE: 20 BRPM | SYSTOLIC BLOOD PRESSURE: 159 MMHG | HEART RATE: 70 BPM | HEIGHT: 70 IN | BODY MASS INDEX: 33.64 KG/M2

## 2025-03-16 DIAGNOSIS — R79.89 ELEVATED BRAIN NATRIURETIC PEPTIDE (BNP) LEVEL: ICD-10-CM

## 2025-03-16 DIAGNOSIS — R06.01 ORTHOPNEA: ICD-10-CM

## 2025-03-16 DIAGNOSIS — J20.6 ACUTE BRONCHITIS DUE TO RHINOVIRUS: Primary | ICD-10-CM

## 2025-03-16 LAB
ALBUMIN SERPL-MCNC: 4.2 G/DL (ref 3.5–5.2)
ALBUMIN/GLOB SERPL: 1.3 G/DL
ALP SERPL-CCNC: 103 U/L (ref 39–117)
ALT SERPL W P-5'-P-CCNC: 22 U/L (ref 1–41)
ANION GAP SERPL CALCULATED.3IONS-SCNC: 12.5 MMOL/L (ref 5–15)
AST SERPL-CCNC: 21 U/L (ref 1–40)
B PARAPERT DNA SPEC QL NAA+PROBE: NOT DETECTED
B PERT DNA SPEC QL NAA+PROBE: NOT DETECTED
BASOPHILS # BLD AUTO: 0.03 10*3/MM3 (ref 0–0.2)
BASOPHILS NFR BLD AUTO: 0.4 % (ref 0–1.5)
BILIRUB SERPL-MCNC: 0.8 MG/DL (ref 0–1.2)
BUN SERPL-MCNC: 25 MG/DL (ref 8–23)
BUN/CREAT SERPL: 28.7 (ref 7–25)
C PNEUM DNA NPH QL NAA+NON-PROBE: NOT DETECTED
CALCIUM SPEC-SCNC: 9.5 MG/DL (ref 8.6–10.5)
CHLORIDE SERPL-SCNC: 103 MMOL/L (ref 98–107)
CO2 SERPL-SCNC: 24.5 MMOL/L (ref 22–29)
CREAT SERPL-MCNC: 0.87 MG/DL (ref 0.76–1.27)
CRP SERPL-MCNC: <0.3 MG/DL (ref 0–0.5)
DEPRECATED RDW RBC AUTO: 43.1 FL (ref 37–54)
EGFRCR SERPLBLD CKD-EPI 2021: 92.2 ML/MIN/1.73
EOSINOPHIL # BLD AUTO: 0.12 10*3/MM3 (ref 0–0.4)
EOSINOPHIL NFR BLD AUTO: 1.5 % (ref 0.3–6.2)
ERYTHROCYTE [DISTWIDTH] IN BLOOD BY AUTOMATED COUNT: 12.2 % (ref 12.3–15.4)
FLUAV SUBTYP SPEC NAA+PROBE: NOT DETECTED
FLUBV RNA ISLT QL NAA+PROBE: NOT DETECTED
GEN 5 1HR TROPONIN T REFLEX: 32 NG/L
GLOBULIN UR ELPH-MCNC: 3.2 GM/DL
GLUCOSE SERPL-MCNC: 163 MG/DL (ref 65–99)
HADV DNA SPEC NAA+PROBE: NOT DETECTED
HCOV 229E RNA SPEC QL NAA+PROBE: NOT DETECTED
HCOV HKU1 RNA SPEC QL NAA+PROBE: NOT DETECTED
HCOV NL63 RNA SPEC QL NAA+PROBE: NOT DETECTED
HCOV OC43 RNA SPEC QL NAA+PROBE: NOT DETECTED
HCT VFR BLD AUTO: 45.1 % (ref 37.5–51)
HGB BLD-MCNC: 15.2 G/DL (ref 13–17.7)
HMPV RNA NPH QL NAA+NON-PROBE: NOT DETECTED
HPIV1 RNA ISLT QL NAA+PROBE: NOT DETECTED
HPIV2 RNA SPEC QL NAA+PROBE: NOT DETECTED
HPIV3 RNA NPH QL NAA+PROBE: NOT DETECTED
HPIV4 P GENE NPH QL NAA+PROBE: NOT DETECTED
IMM GRANULOCYTES # BLD AUTO: 0.02 10*3/MM3 (ref 0–0.05)
IMM GRANULOCYTES NFR BLD AUTO: 0.3 % (ref 0–0.5)
LYMPHOCYTES # BLD AUTO: 1.76 10*3/MM3 (ref 0.7–3.1)
LYMPHOCYTES NFR BLD AUTO: 22.5 % (ref 19.6–45.3)
M PNEUMO IGG SER IA-ACNC: NOT DETECTED
MCH RBC QN AUTO: 31.5 PG (ref 26.6–33)
MCHC RBC AUTO-ENTMCNC: 33.7 G/DL (ref 31.5–35.7)
MCV RBC AUTO: 93.4 FL (ref 79–97)
MONOCYTES # BLD AUTO: 0.75 10*3/MM3 (ref 0.1–0.9)
MONOCYTES NFR BLD AUTO: 9.6 % (ref 5–12)
NEUTROPHILS NFR BLD AUTO: 5.15 10*3/MM3 (ref 1.7–7)
NEUTROPHILS NFR BLD AUTO: 65.7 % (ref 42.7–76)
NT-PROBNP SERPL-MCNC: 1451 PG/ML (ref 0–900)
PLATELET # BLD AUTO: 217 10*3/MM3 (ref 140–450)
PMV BLD AUTO: 10 FL (ref 6–12)
POTASSIUM SERPL-SCNC: 3.7 MMOL/L (ref 3.5–5.2)
PROCALCITONIN SERPL-MCNC: 0.02 NG/ML (ref 0–0.25)
PROT SERPL-MCNC: 7.4 G/DL (ref 6–8.5)
RBC # BLD AUTO: 4.83 10*6/MM3 (ref 4.14–5.8)
RHINOVIRUS RNA SPEC NAA+PROBE: DETECTED
RSV RNA NPH QL NAA+NON-PROBE: NOT DETECTED
SARS-COV-2 RNA RESP QL NAA+PROBE: NOT DETECTED
SODIUM SERPL-SCNC: 140 MMOL/L (ref 136–145)
TROPONIN T % DELTA: -3
TROPONIN T NUMERIC DELTA: -1 NG/L
TROPONIN T SERPL HS-MCNC: 33 NG/L
WBC NRBC COR # BLD AUTO: 7.83 10*3/MM3 (ref 3.4–10.8)

## 2025-03-16 PROCEDURE — 94640 AIRWAY INHALATION TREATMENT: CPT

## 2025-03-16 PROCEDURE — 86140 C-REACTIVE PROTEIN: CPT | Performed by: STUDENT IN AN ORGANIZED HEALTH CARE EDUCATION/TRAINING PROGRAM

## 2025-03-16 PROCEDURE — 84484 ASSAY OF TROPONIN QUANT: CPT | Performed by: STUDENT IN AN ORGANIZED HEALTH CARE EDUCATION/TRAINING PROGRAM

## 2025-03-16 PROCEDURE — 93005 ELECTROCARDIOGRAM TRACING: CPT | Performed by: STUDENT IN AN ORGANIZED HEALTH CARE EDUCATION/TRAINING PROGRAM

## 2025-03-16 PROCEDURE — 36415 COLL VENOUS BLD VENIPUNCTURE: CPT

## 2025-03-16 PROCEDURE — 80053 COMPREHEN METABOLIC PANEL: CPT | Performed by: STUDENT IN AN ORGANIZED HEALTH CARE EDUCATION/TRAINING PROGRAM

## 2025-03-16 PROCEDURE — 84145 PROCALCITONIN (PCT): CPT | Performed by: STUDENT IN AN ORGANIZED HEALTH CARE EDUCATION/TRAINING PROGRAM

## 2025-03-16 PROCEDURE — 0202U NFCT DS 22 TRGT SARS-COV-2: CPT | Performed by: STUDENT IN AN ORGANIZED HEALTH CARE EDUCATION/TRAINING PROGRAM

## 2025-03-16 PROCEDURE — 99284 EMERGENCY DEPT VISIT MOD MDM: CPT

## 2025-03-16 PROCEDURE — 85025 COMPLETE CBC W/AUTO DIFF WBC: CPT | Performed by: STUDENT IN AN ORGANIZED HEALTH CARE EDUCATION/TRAINING PROGRAM

## 2025-03-16 PROCEDURE — 83880 ASSAY OF NATRIURETIC PEPTIDE: CPT | Performed by: STUDENT IN AN ORGANIZED HEALTH CARE EDUCATION/TRAINING PROGRAM

## 2025-03-16 PROCEDURE — 71045 X-RAY EXAM CHEST 1 VIEW: CPT

## 2025-03-16 RX ORDER — METOPROLOL TARTRATE 25 MG/1
12.5 TABLET, FILM COATED ORAL 2 TIMES DAILY
Qty: 30 TABLET | Refills: 0 | Status: SHIPPED | OUTPATIENT
Start: 2025-03-16 | End: 2025-04-15

## 2025-03-16 RX ORDER — ALBUTEROL SULFATE 90 UG/1
2 INHALANT RESPIRATORY (INHALATION) EVERY 4 HOURS PRN
Qty: 17 G | Refills: 0 | Status: SHIPPED | OUTPATIENT
Start: 2025-03-16

## 2025-03-16 RX ORDER — ALBUTEROL SULFATE 0.83 MG/ML
2.5 SOLUTION RESPIRATORY (INHALATION) ONCE
Status: COMPLETED | OUTPATIENT
Start: 2025-03-16 | End: 2025-03-16

## 2025-03-16 RX ORDER — FUROSEMIDE 20 MG/1
20 TABLET ORAL EVERY OTHER DAY
Qty: 15 TABLET | Refills: 0 | Status: SHIPPED | OUTPATIENT
Start: 2025-03-16

## 2025-03-16 RX ADMIN — ALBUTEROL SULFATE 2.5 MG: 2.5 SOLUTION RESPIRATORY (INHALATION) at 19:56

## 2025-03-17 NOTE — FSED PROVIDER NOTE
Subjective   History of Present Illness  69-year-old male whose had persistent cough and mild shortness of breath worsening over the past month presents for evaluation.  He states his cough has gotten worse in the past week.  His shortness of breath persists of difficulty taking a deep breath very reactive cough is nonproductive some difficulty laying flat which makes his shortness of breath worse.  He denies chest pain back pain diaphoresis or nausea.  He has had no abdominal pain nausea or vomiting.        Review of Systems   All other systems reviewed and are negative.      Past Medical History:   Diagnosis Date    Diabetes     Hypertension        No Known Allergies    No past surgical history on file.    Family History   Problem Relation Age of Onset    No Known Problems Mother        Social History     Socioeconomic History    Marital status:    Tobacco Use    Smoking status: Never    Smokeless tobacco: Never   Vaping Use    Vaping status: Never Used   Substance and Sexual Activity    Alcohol use: Never    Drug use: Never    Sexual activity: Defer           Objective   Physical Exam  Vitals reviewed.   HENT:      Head: Normocephalic.      Nose: Nose normal.      Mouth/Throat:      Mouth: Mucous membranes are moist.   Eyes:      Extraocular Movements: Extraocular movements intact.   Neck:      Vascular: No carotid bruit.      Comments: Negative JVD in the upright position  Cardiovascular:      Rate and Rhythm: Normal rate and regular rhythm.      Pulses: Normal pulses.      Heart sounds: Normal heart sounds.   Pulmonary:      Effort: Pulmonary effort is normal. No respiratory distress.      Breath sounds: No stridor. No wheezing, rhonchi or rales.      Comments: Persistent reactive cough particularly taking deep breaths  Abdominal:      Comments: Protuberant abdomen is otherwise soft nondistended and nontender   Musculoskeletal:      Cervical back: Normal range of motion and neck supple. No tenderness.       Right lower leg: No edema.      Left lower leg: No edema.   Skin:     General: Skin is warm and dry.      Capillary Refill: Capillary refill takes less than 2 seconds.   Neurological:      General: No focal deficit present.      Mental Status: He is alert and oriented to person, place, and time.         Procedures           ED Course  ED Course as of 03/16/25 2158   Sun Mar 16, 2025   1941 EKG ventricular rate 72   QTc 442 left axis deviation left ventricular hypertrophy voltage criteria met appropriate T wave deflections from hypertrophy    Negative STEMI [JN]   2150 BNP(!) [JN]   2150 High Sensitivity Troponin T 1Hr(!)  Troponin is flat with no acute elevation [JN]   2150 XR Chest 1 View  No acute pulmonary edema no peripheral edema noted on exam this cardiomegaly suggest increased concern for chronic hypertension related development of heart failure [JN]      ED Course User Index  [JN] Félix Montes MD                                           Medical Decision Making  69-year-old male presents with nasal congestion as well as a more subacute persistent shortness of breath orthopnea pattern of symptoms with a history of diabetes and hypertension.    Differential includes viral URI, pneumonia, CHF, pulmonary hypertension, ACS, pneumonia, and others    Patient chest x-ray demonstrates no focal airspace disease or pulmonary edema he does have cardiomegaly.  His troponin is slightly elevated but delta returns with a normal delta range 1 point difference have concern that this is a chronic finding given that he is not having acute chest pain has evidence of left ventricular hypertrophy and concern for new onset heart failure that is undiagnosed.  His BNP is elevated no peripheral edema JVD or pulmonary edema but he does have clinical orthopnea as a complaint raising suspicion for mild CHF.     Positive for rhinovirus    The remainder of his labs are nonactionable    Patient is not amenable to staying  for cardiac workup I do feel is stable and safe to work his concern for CHF evaluation in the outpatient setting.  He will be referred urgently to cardiology we will start him on basic management for CHF with every other day Lasix and daily metoprolol in addition to his current lisinopril for blood pressure control.  Patient responded very well to albuterol nebulizer with his rhinovirus bronchitis his cough is almost subsided we will put him on albuterol inhaler at discharge.    Plans of care will be discharged home in good condition with return precaution    Problems Addressed:  Acute bronchitis due to Rhinovirus: complicated acute illness or injury  Elevated brain natriuretic peptide (BNP) level: complicated acute illness or injury  Orthopnea: complicated acute illness or injury    Amount and/or Complexity of Data Reviewed  Labs: ordered. Decision-making details documented in ED Course.  Radiology: ordered. Decision-making details documented in ED Course.  ECG/medicine tests: ordered.    Risk  Prescription drug management.        Final diagnoses:   Acute bronchitis due to Rhinovirus   Orthopnea   Elevated brain natriuretic peptide (BNP) level       ED Disposition  ED Disposition       ED Disposition   Discharge    Condition   Stable    Comment   --               Fredis Boateng MD  [address] [place] [address]  [phone]          Fredis Boateng MD  [address] [place] [address]  [phone]               Medication List        New Prescriptions      furosemide 20 MG tablet  Commonly known as: LASIX  Take 1 tablet by mouth Every Other Day.     metoprolol tartrate 25 MG tablet  Commonly known as: LOPRESSOR  Take 0.5 tablets by mouth 2 (Two) Times a Day for 30 days.               Where to Get Your Medications        These medications were sent to [facility] [address] [place] [address] [phone] [facility] [phone]    [address] [address]      Phone: [phone]   albuterol sulfate  (90 Base) MCG/ACT inhaler  furosemide 20 MG tablet  metoprolol tartrate 25 MG tablet

## 2025-03-17 NOTE — DISCHARGE INSTRUCTIONS
You were diagnosed today with rhinovirus bronchitis, however I do have concerns that you are developing chronic heart failure related to your chronic hypertension. Please follow up with the cardiologist here at [facility] and be expecting their call within the next few days. I have ordered new meds to start managing these symptoms and recommend taking them as prescribed. Use the albuterol inhaler for both your cough and shortness of breath.   Please return to the ER if you have any new or worsening symptoms including but not limited to severe shortness of breath or chest pain

## 2025-03-19 NOTE — PROGRESS NOTES
Cardiology New Patient Note     Name: Flip Yu II  :   1955  PCP: Nico Theodore MD  Date:   2025  Department: [facility] CARDIOLOGY  [address] [facility] [address]  [facility] [address]  Fax [phone]  Phone [phone]    Chief Complaint   Patient presents with    Shortness of Breath    Hypertension     Subjective     History of Present Illness  Flip Yu II is a 69 y.o. male who presents today as a new patient.  The patient has history of hypertension, hypercholesteremia and diabetes mellitus type 2.  Patient was referred by emergency room physician after he presented to the ER at [facility].  The patient was on 3/16/2025 with a chief complaint of shortness of breath, smothering feeling when he would lay down on his back, and this is associated with shortness of breath exertion, the symptoms are progressively gotten gotten worse over the last 2 years.  The patient states he is not active and this may be another reason why he is having problems.    Past Medical History:   Diagnosis Date    Diabetes     Hypertension       History reviewed. No pertinent surgical history.  Family History   Problem Relation Age of Onset    No Known Problems Mother      Social History     Socioeconomic History    Marital status:    Tobacco Use    Smoking status: Never    Smokeless tobacco: Never   Vaping Use    Vaping status: Never Used   Substance and Sexual Activity    Alcohol use: Never    Drug use: Never    Sexual activity: Defer       No Known Allergies    Current Outpatient Medications:     acetaminophen (TYLENOL) 325 MG tablet, Take 2 tablets by mouth Every 6 (Six) Hours As Needed for Mild Pain ., Disp: 120 tablet, Rfl: 0    albuterol sulfate  (90 Base) MCG/ACT inhaler, Inhale 2 puffs Every 4 (Four) Hours As Needed for Wheezing., Disp: 17 g, Rfl: 0    benzonatate (TESSALON) 200 MG capsule, Take 1 capsule by mouth 3 (Three)  "Times a Day As Needed for Cough., Disp: 30 capsule, Rfl: 0    furosemide (LASIX) 20 MG tablet, Take 1 tablet by mouth Every Other Day., Disp: 15 tablet, Rfl: 0    glipizide (Glucotrol) 10 MG tablet, Take 1 tablet by mouth 2 (Two) Times a Day Before Meals., Disp: 60 tablet, Rfl: 0    glucose blood test strip, Use as instructed four times daily, Disp: 100 each, Rfl: 0    Lancets 33G misc, Use to test four times daily, Disp: 100 each, Rfl: 0    linagliptin (Tradjenta) 5 MG tablet tablet, Take 1 tablet by mouth Daily., Disp: 30 tablet, Rfl: 0    metoprolol tartrate (LOPRESSOR) 25 MG tablet, Take 0.5 tablets by mouth 2 (Two) Times a Day for 30 days., Disp: 30 tablet, Rfl: 0    pravastatin (PRAVACHOL) 20 MG tablet, , Disp: , Rfl:     Review of Systems   Constitutional:  Positive for diaphoresis and fatigue.   Respiratory:  Positive for chest tightness and shortness of breath. Negative for wheezing.    Cardiovascular:  Negative for chest pain, palpitations and leg swelling.   Psychiatric/Behavioral:  Negative for agitation and behavioral problems.        Objective     Vital Signs:  /96 (BP Location: Left arm, Patient Position: Sitting)   Pulse 82   Ht 188 cm (74\")   Wt 107 kg (235 lb)   BMI 30.17 kg/m²   Estimated body mass index is 30.17 kg/m² as calculated from the following:    Height as of this encounter: 188 cm (74\").    Weight as of this encounter: 107 kg (235 lb).             Vitals reviewed.   Constitutional:       Appearance: Normal and healthy appearance.   Eyes:      Pupils: Pupils are equal, round, and reactive to light.   Pulmonary:      Effort: Pulmonary effort is normal.   Chest:      Chest wall: Not tender to palpatation.   Cardiovascular:      PMI at left midclavicular line. Normal rate. Regular rhythm.      No gallop.    Pulses:     Intact distal pulses.   Edema:     Peripheral edema absent.   Skin:     General: Skin is warm.   Psychiatric:         Behavior: Behavior is cooperative.          " "    Data Review:   Lab Results   Component Value Date    GLUCOSE 163 (H) 03/16/2025    BUN 25 (H) 03/16/2025    CREATININE 0.87 03/16/2025    EGFRIFNONA 95 09/17/2021    BCR 28.7 (H) 03/16/2025    K 3.7 03/16/2025    CO2 24.5 03/16/2025    CALCIUM 9.5 03/16/2025    ALBUMIN 4.2 03/16/2025    AST 21 03/16/2025    ALT 22 03/16/2025     No results found for: \"CHOL\", \"CHLPL\", \"TRIG\", \"HDL\", \"LDL\", \"LDLDIRECT\"   Lab Results   Component Value Date    WBC 7.83 03/16/2025    RBC 4.83 03/16/2025    HGB 15.2 03/16/2025    HCT 45.1 03/16/2025    MCV 93.4 03/16/2025     03/16/2025     No results found for: \"TSH\"  Lab Results   Component Value Date    HGBA1C 11.20 (H) 09/11/2021     No results found for: \"INR\", \"PROTIME\"        Assessment and Plan     Assessment & Plan  Dyspnea on exertion  The patient symptoms are consistent with angina and left heart failure.  Will do an echocardiogram and a nuclear Cardiolite study for further evaluation.  We had a long discussion about risk factor modification.   Orders:    Adult Transthoracic Echo Complete W/ Cont if Necessary Per Protocol; Future    Stress Test With Myocardial Perfusion One Day; Future    Lipid Panel; Future    Microalbumin / Creatinine Urine Ratio - Urine, Clean Catch; Future    Hypertension, essential  Start valsartan 40 mg po daily, discussed with patient that needs to check blood pressure daily.    Discussed with patient American College of cardiology and American Heart Association provide detailed guidelines for accurate blood pressure measurement.  Key steps for proper blood pressure measurement include:    Recommend being fully relaxed sitting in chair with feet on the floor and back supported for at least 5 minutes.  Avoid caffeine, exercise and smoking for at least 30 minutes before management.  Ensure empty bladder, remove clothing covering the location of the cuff placement using proper blood pressure equipment and the blood pressure cuff size should be " corrected with bladder encircling 80% of the arm.  Repeat blood pressure if blood pressure is extremely high.  The ideal blood pressure is less than 120/80 on the average blood pressure should be less than 130/80.          Orders:    Adult Transthoracic Echo Complete W/ Cont if Necessary Per Protocol; Future    Stress Test With Myocardial Perfusion One Day; Future    Lipid Panel; Future    Microalbumin / Creatinine Urine Ratio - Urine, Clean Catch; Future    Type 2 diabetes mellitus with hyperglycemia, without long-term current use of insulin  The patient blood sugar was high however he had orange juice before he had blood work done in the emergency room.  The patient previous hemoglobin A1c was 11.2, we discussed what hemoglobin A1c stands for an also discussed about diabetes treatment he needs to go see Dr. Theodore to get his diabetes medication adjusted, patient stopped taking metformin and lisinopril after he checked the side effects.  I am discussed with the patient not to go and do treatments on his own.  This could really hurt him in the long run.  There is a high risk for developing peripheral arterial disease, coronary artery disease, stroke, renal failure.    Orders:    Adult Transthoracic Echo Complete W/ Cont if Necessary Per Protocol; Future    Stress Test With Myocardial Perfusion One Day; Future    Lipid Panel; Future    Microalbumin / Creatinine Urine Ratio - Urine, Clean Catch; Future      Discussed with the patient compliance with medical management and follow-up.     Follow Up  Return in about 3 months (around 6/21/2025).    Call if you have any significant symptoms or go to the [facility] room if possible.     Fredis Boateng MD, FACC,[facility].  [facility] [facility]    Part of this note may be an electronic transcription/translation of spoken language to printed text using the Dragon Dictation System.

## 2025-03-19 NOTE — ASSESSMENT & PLAN NOTE
The patient blood sugar was high however he had orange juice before he had blood work done in the emergency room.  The patient previous hemoglobin A1c was 11.2, we discussed what hemoglobin A1c stands for an also discussed about diabetes treatment he needs to go see Dr. Theodore to get his diabetes medication adjusted, patient stopped taking metformin and lisinopril after he checked the side effects.  I am discussed with the patient not to go and do treatments on his own.  This could really hurt him in the long run.  There is a high risk for developing peripheral arterial disease, coronary artery disease, stroke, renal failure.    Orders:    Adult Transthoracic Echo Complete W/ Cont if Necessary Per Protocol; Future    Stress Test With Myocardial Perfusion One Day; Future    Lipid Panel; Future    Microalbumin / Creatinine Urine Ratio - Urine, Clean Catch; Future

## 2025-03-19 NOTE — ASSESSMENT & PLAN NOTE
Start valsartan 40 mg po daily, discussed with patient that needs to check blood pressure daily.    Discussed with patient American College of cardiology and American Heart Association provide detailed guidelines for accurate blood pressure measurement.  Key steps for proper blood pressure measurement include:    Recommend being fully relaxed sitting in chair with feet on the floor and back supported for at least 5 minutes.  Avoid caffeine, exercise and smoking for at least 30 minutes before management.  Ensure empty bladder, remove clothing covering the location of the cuff placement using proper blood pressure equipment and the blood pressure cuff size should be corrected with bladder encircling 80% of the arm.  Repeat blood pressure if blood pressure is extremely high.  The ideal blood pressure is less than 120/80 on the average blood pressure should be less than 130/80.          Orders:    Adult Transthoracic Echo Complete W/ Cont if Necessary Per Protocol; Future    Stress Test With Myocardial Perfusion One Day; Future    Lipid Panel; Future    Microalbumin / Creatinine Urine Ratio - Urine, Clean Catch; Future

## 2025-03-21 ENCOUNTER — OFFICE VISIT (OUTPATIENT)
Age: 70
End: 2025-03-21
Payer: MEDICARE

## 2025-03-21 VITALS
BODY MASS INDEX: 30.16 KG/M2 | SYSTOLIC BLOOD PRESSURE: 164 MMHG | HEIGHT: 74 IN | DIASTOLIC BLOOD PRESSURE: 96 MMHG | HEART RATE: 82 BPM | WEIGHT: 235 LBS

## 2025-03-21 DIAGNOSIS — E11.65 TYPE 2 DIABETES MELLITUS WITH HYPERGLYCEMIA, WITHOUT LONG-TERM CURRENT USE OF INSULIN: ICD-10-CM

## 2025-03-21 DIAGNOSIS — R06.09 DYSPNEA ON EXERTION: Primary | ICD-10-CM

## 2025-03-21 DIAGNOSIS — I10 HYPERTENSION, ESSENTIAL: ICD-10-CM

## 2025-03-21 NOTE — ASSESSMENT & PLAN NOTE
The patient symptoms are consistent with angina and left heart failure.  Will do an echocardiogram and a nuclear Cardiolite study for further evaluation.  We had a long discussion about risk factor modification.   Orders:    Adult Transthoracic Echo Complete W/ Cont if Necessary Per Protocol; Future    Stress Test With Myocardial Perfusion One Day; Future    Lipid Panel; Future    Microalbumin / Creatinine Urine Ratio - Urine, Clean Catch; Future

## 2025-03-23 LAB
QT INTERVAL: 404 MS
QTC INTERVAL: 442 MS

## 2025-04-25 ENCOUNTER — HOSPITAL ENCOUNTER (OUTPATIENT)
Facility: HOSPITAL | Age: 70
Discharge: HOME | End: 2025-04-25
Payer: MEDICARE

## 2025-06-23 ENCOUNTER — TELEPHONE (OUTPATIENT)
Age: 70
End: 2025-06-23
Payer: MEDICARE

## 2025-06-23 NOTE — PROGRESS NOTES
Cardiology Follow-Up Note     Name: Flip Yu II  :   1955  PCP: Nico Theodore MD  Date:   2025  Department: [facility] CARDIOLOGY  [address] [facility] [address]  [facility] [address]  Fax [phone]  Phone [phone]    No chief complaint on file.  Dyspnea on exertion      Subjective     History of Present Illness  Flip Yu II is a 70 y.o. male who presents today for for follow-up for dyspnea on exertion angina equivalent, with diabetes mellitus type 2 and hypertension including his premature coronary risk factor.  The patient was supposed to be scheduled for an echocardiogram and nuclear Cardiolite study    Past Medical History:   Diagnosis Date    Diabetes     Hypertension       No past surgical history on file.    Current Outpatient Medications:     acetaminophen (TYLENOL) 325 MG tablet, Take 2 tablets by mouth Every 6 (Six) Hours As Needed for Mild Pain ., Disp: 120 tablet, Rfl: 0    albuterol sulfate  (90 Base) MCG/ACT inhaler, Inhale 2 puffs Every 4 (Four) Hours As Needed for Wheezing., Disp: 17 g, Rfl: 0    benzonatate (TESSALON) 200 MG capsule, Take 1 capsule by mouth 3 (Three) Times a Day As Needed for Cough., Disp: 30 capsule, Rfl: 0    furosemide (LASIX) 20 MG tablet, Take 1 tablet by mouth Every Other Day., Disp: 15 tablet, Rfl: 0    glipizide (Glucotrol) 10 MG tablet, Take 1 tablet by mouth 2 (Two) Times a Day Before Meals., Disp: 60 tablet, Rfl: 0    glucose blood test strip, Use as instructed four times daily, Disp: 100 each, Rfl: 0    Lancets 33G misc, Use to test four times daily, Disp: 100 each, Rfl: 0    linagliptin (Tradjenta) 5 MG tablet tablet, Take 1 tablet by mouth Daily., Disp: 30 tablet, Rfl: 0    metoprolol tartrate (LOPRESSOR) 25 MG tablet, Take 0.5 tablets by mouth 2 (Two) Times a Day for 30 days., Disp: 30 tablet, Rfl: 0    pravastatin (PRAVACHOL) 20 MG tablet, , Disp: , Rfl:     Current  "Facility-Administered Medications:     Semaglutide(0.25 or 0.5MG/DOS) (OZEMPIC) solution pen-injector 0.25 mg, 0.25 mg, Subcutaneous, Weekly, Fredis Boateng MD    Objective     Vital Signs:  /91 (BP Location: Left arm, Patient Position: Sitting)   Pulse 74   Ht 188 cm (74\")   Wt 109 kg (240 lb 1.6 oz)   BMI 30.83 kg/m²   Estimated body mass index is 30.83 kg/m² as calculated from the following:    Height as of this encounter: 188 cm (74\").    Weight as of this encounter: 109 kg (240 lb 1.6 oz).             Cardiovascular:      PMI at left midclavicular line. Normal rate. Regular rhythm. Normal S1. Normal S2.       Murmurs: There is no murmur.      No gallop.  No click. No rub.   Pulses:     Intact distal pulses.   Edema:     Peripheral edema absent.              Data Review:   Lab Results   Component Value Date    GLUCOSE 163 (H) 03/16/2025    BUN 25 (H) 03/16/2025    CREATININE 0.87 03/16/2025    EGFRIFNONA 95 09/17/2021    BCR 28.7 (H) 03/16/2025    K 3.7 03/16/2025    CO2 24.5 03/16/2025    CALCIUM 9.5 03/16/2025    ALBUMIN 4.2 03/16/2025    AST 21 03/16/2025    ALT 22 03/16/2025     No results found for: \"CHOL\", \"CHLPL\", \"TRIG\", \"HDL\", \"LDL\", \"LDLDIRECT\"   Lab Results   Component Value Date    WBC 7.83 03/16/2025    RBC 4.83 03/16/2025    HGB 15.2 03/16/2025    HCT 45.1 03/16/2025    MCV 93.4 03/16/2025     03/16/2025     No results found for: \"TSH\"  Lab Results   Component Value Date    HGBA1C 11.20 (H) 09/11/2021     No results found for: \"INR\", \"PROTIME\"        Assessment and Plan     Assessment & Plan  Hypertension, essential  Continue metoprolol tarterate 25 mg twice a day, Patient is out of it.     Orders:    Semaglutide(0.25 or 0.5MG/DOS) (OZEMPIC) solution pen-injector 0.25 mg    CBC & Differential; Future    Basic Metabolic Panel; Future    Lipid Panel; Future    Adult Transthoracic Echo Complete W/ Cont if Necessary Per Protocol; Future    Stress Test With Myocardial Perfusion One " Day; Future    Doppler Ankle Brachial Index Single Level CAR; Future    Dyspnea on exertion  Patient states too busy with work and did not do the tests.  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  The high hemoglobin A1c means the blood sugar is remaining high for the last 3 months, this can cause severe disease in the coronary arteries including small vessel disease, renal failure, peripheral arterial disease with limb loss.  The goal is to keep the A1c less than 7.0.  Also will start him on Ozempic, advised the patient to follow-up with the family physician to get that level down as soon as possible.  Discussed with patient about Ozempic and weight loss.  Orders:    Semaglutide(0.25 or 0.5MG/DOS) (OZEMPIC) solution pen-injector 0.25 mg    CBC & Differential; Future    Basic Metabolic Panel; Future    Lipid Panel; Future    Adult Transthoracic Echo Complete W/ Cont if Necessary Per Protocol; Future    Stress Test With Myocardial Perfusion One Day; Future    Doppler Ankle Brachial Index Single Level CAR; Future    Diabetes mellitus due to underlying condition with hyperglycemia, unspecified whether long term insulin use      Orders:    Doppler Ankle Brachial Index Single Level CAR; Future      Advised to continue current cardiac medications. Please notify of any issues. Discussed with the patient compliance with medical management and follow-up.     Follow Up  Return for Follow-up post-testing.    Call if you have any significant symptoms or go to the  [facility] room if possible.     Fredis Boateng MD, FACC,[facility].  [place] [facility]    Part of this note may be an electronic transcription/translation of spoken language to printed text using the Dragon Dictation System.

## 2025-06-23 NOTE — ASSESSMENT & PLAN NOTE
Continue metoprolol tarterate 25 mg twice a day, Patient is out of it.     Orders:    Semaglutide(0.25 or 0.5MG/DOS) (OZEMPIC) solution pen-injector 0.25 mg    CBC & Differential; Future    Basic Metabolic Panel; Future    Lipid Panel; Future    Adult Transthoracic Echo Complete W/ Cont if Necessary Per Protocol; Future    Stress Test With Myocardial Perfusion One Day; Future    Doppler Ankle Brachial Index Single Level CAR; Future

## 2025-06-23 NOTE — TELEPHONE ENCOUNTER
Called patient to reschedule appt for 06/24 @ 10:15 as he has not had his testing yet. No answer, left vm

## 2025-06-24 ENCOUNTER — OFFICE VISIT (OUTPATIENT)
Age: 70
End: 2025-06-24
Payer: MEDICARE

## 2025-06-24 VITALS
DIASTOLIC BLOOD PRESSURE: 91 MMHG | BODY MASS INDEX: 30.81 KG/M2 | HEART RATE: 74 BPM | SYSTOLIC BLOOD PRESSURE: 156 MMHG | WEIGHT: 240.1 LBS | HEIGHT: 74 IN

## 2025-06-24 DIAGNOSIS — R06.09 DYSPNEA ON EXERTION: ICD-10-CM

## 2025-06-24 DIAGNOSIS — E08.65 DIABETES MELLITUS DUE TO UNDERLYING CONDITION WITH HYPERGLYCEMIA, UNSPECIFIED WHETHER LONG TERM INSULIN USE: ICD-10-CM

## 2025-06-24 DIAGNOSIS — E66.2: ICD-10-CM

## 2025-06-24 DIAGNOSIS — I10 HYPERTENSION, ESSENTIAL: Primary | ICD-10-CM

## 2025-06-24 PROCEDURE — 3080F DIAST BP >= 90 MM HG: CPT | Performed by: INTERNAL MEDICINE

## 2025-06-24 PROCEDURE — 3077F SYST BP >= 140 MM HG: CPT | Performed by: INTERNAL MEDICINE

## 2025-06-24 PROCEDURE — 1160F RVW MEDS BY RX/DR IN RCRD: CPT | Performed by: INTERNAL MEDICINE

## 2025-06-24 PROCEDURE — 1159F MED LIST DOCD IN RCRD: CPT | Performed by: INTERNAL MEDICINE

## 2025-06-24 PROCEDURE — 99214 OFFICE O/P EST MOD 30 MIN: CPT | Performed by: INTERNAL MEDICINE

## 2025-06-24 NOTE — ASSESSMENT & PLAN NOTE
Discuss in detail about the concern of hemoglobin A1c which is 11.20.  The high hemoglobin A1c means the blood sugar is remaining high for the last 3 months, this can cause severe disease in the coronary arteries including small vessel disease, renal failure, peripheral arterial disease with limb loss.  The goal is to keep the A1c less than 7.0.  Also will start him on Ozempic, advised the patient to follow-up with the family physician to get that level down as soon as possible.  Discussed with patient about Ozempic and weight loss.  Orders:    Semaglutide(0.25 or 0.5MG/DOS) (OZEMPIC) solution pen-injector 0.25 mg    CBC & Differential; Future    Basic Metabolic Panel; Future    Lipid Panel; Future    Adult Transthoracic Echo Complete W/ Cont if Necessary Per Protocol; Future    Stress Test With Myocardial Perfusion One Day; Future    Doppler Ankle Brachial Index Single Level CAR; Future

## 2025-07-17 NOTE — ASSESSMENT & PLAN NOTE
7/17/2025    Mikal Harris    Chief Complaint   Patient presents with    Follow-up    Discuss Labs       HPI  History was obtained from pt.  Mikal is a 19 y.o. male with a PMHx as listed below   History of Present Illness          Review of labs with patient. -Elevated ALT, prediabetes, ketonuria, HSV-1 positive discussed prediabetes and lifestyle choices, diet choices.  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  We discussed proper eating and drinking to avoid dehydration.  I do still recommend decreasing carbs especially processed carbohydrates and sugars although some complex carbohydrates will still be helpful for energy as he is a high-level .  Discussed ALT that we will monitor it.    Discussed HSV-1, common virus usually causes cold sores which she has a history of.  No current flares.    He has not attempted any sexual activity since last week and we discussed results of his workup.  He denies any new symptoms    1. Ejaculatory disorder    2. Itching of male genitalia           REVIEW OF SYMPTOMS    Review of Systems    PAST MEDICAL HISTORY  Past Medical History:   Diagnosis Date    Asthma        FAMILY HISTORY  Family History   Problem Relation Age of Onset    No Known Problems Mother     No Known Problems Father        SOCIAL HISTORY  Social History     Socioeconomic History    Marital status: Single     Spouse name: None    Number of children: None    Years of education: None    Highest education level: None   Tobacco Use    Smoking status: Never     Passive exposure: Never    Smokeless tobacco: Never   Vaping Use    Vaping status: Never Used   Substance and Sexual Activity    Alcohol use: Never    Drug use: Never    Sexual activity: Not Currently     Partners: Female     Social Drivers of Health     Financial Resource Strain: Low Risk  (2/29/2024)    Overall Financial Resource Strain (CARDIA)     Difficulty of Paying Living Expenses: Not  Patient states too busy with work and did not do the tests.  Orders:    Semaglutide(0.25 or 0.5MG/DOS) (OZEMPIC) solution pen-injector 0.25 mg    CBC & Differential; Future    Basic Metabolic Panel; Future    Lipid Panel; Future    Adult Transthoracic Echo Complete W/ Cont if Necessary Per Protocol; Future    Stress Test With Myocardial Perfusion One Day; Future    Doppler Ankle Brachial Index Single Level CAR; Future

## 2025-07-21 ENCOUNTER — APPOINTMENT (OUTPATIENT)
Facility: HOSPITAL | Age: 70
End: 2025-07-21
Payer: MEDICARE

## 2025-07-21 ENCOUNTER — HOSPITAL ENCOUNTER (OUTPATIENT)
Facility: HOSPITAL | Age: 70
Setting detail: OBSERVATION
Discharge: HOME | End: 2025-07-22
Attending: EMERGENCY MEDICINE | Admitting: PHYSICIAN ASSISTANT
Payer: MEDICARE

## 2025-07-21 DIAGNOSIS — I50.9 OTHER CONGESTIVE HEART FAILURE: Primary | ICD-10-CM

## 2025-07-21 DIAGNOSIS — R06.00 DYSPNEA, UNSPECIFIED TYPE: ICD-10-CM

## 2025-07-21 LAB
ALBUMIN SERPL-MCNC: 4 G/DL (ref 3.5–5.2)
ALBUMIN/GLOB SERPL: 1.4 G/DL
ALP SERPL-CCNC: 112 U/L (ref 39–117)
ALT SERPL W P-5'-P-CCNC: 55 U/L (ref 1–41)
ANION GAP SERPL CALCULATED.3IONS-SCNC: 12.9 MMOL/L (ref 5–15)
AST SERPL-CCNC: 31 U/L (ref 1–40)
BASOPHILS # BLD AUTO: 0.03 10*3/MM3 (ref 0–0.2)
BASOPHILS NFR BLD AUTO: 0.4 % (ref 0–1.5)
BILIRUB SERPL-MCNC: 1.1 MG/DL (ref 0–1.2)
BILIRUB UR QL STRIP: NEGATIVE
BUN SERPL-MCNC: 20.4 MG/DL (ref 8–23)
BUN/CREAT SERPL: 22.9 (ref 7–25)
CALCIUM SPEC-SCNC: 9.7 MG/DL (ref 8.6–10.5)
CHLORIDE SERPL-SCNC: 104 MMOL/L (ref 98–107)
CLARITY UR: CLEAR
CO2 SERPL-SCNC: 25.1 MMOL/L (ref 22–29)
COLOR UR: YELLOW
CREAT SERPL-MCNC: 0.89 MG/DL (ref 0.76–1.27)
DEPRECATED RDW RBC AUTO: 46.5 FL (ref 37–54)
EGFRCR SERPLBLD CKD-EPI 2021: 91.1 ML/MIN/1.73
EOSINOPHIL # BLD AUTO: 0.04 10*3/MM3 (ref 0–0.4)
EOSINOPHIL NFR BLD AUTO: 0.5 % (ref 0.3–6.2)
ERYTHROCYTE [DISTWIDTH] IN BLOOD BY AUTOMATED COUNT: 13.4 % (ref 12.3–15.4)
GEN 5 1HR TROPONIN T REFLEX: 36 NG/L
GLOBULIN UR ELPH-MCNC: 2.9 GM/DL
GLUCOSE BLDC GLUCOMTR-MCNC: 162 MG/DL (ref 70–130)
GLUCOSE BLDC GLUCOMTR-MCNC: 202 MG/DL (ref 70–130)
GLUCOSE SERPL-MCNC: 255 MG/DL (ref 65–99)
GLUCOSE UR STRIP-MCNC: ABNORMAL MG/DL
HCT VFR BLD AUTO: 47.3 % (ref 37.5–51)
HGB BLD-MCNC: 15.8 G/DL (ref 13–17.7)
HGB UR QL STRIP.AUTO: NEGATIVE
HOLD SPECIMEN: NORMAL
IMM GRANULOCYTES # BLD AUTO: 0.01 10*3/MM3 (ref 0–0.05)
IMM GRANULOCYTES NFR BLD AUTO: 0.1 % (ref 0–0.5)
KETONES UR QL STRIP: NEGATIVE
LEUKOCYTE ESTERASE UR QL STRIP.AUTO: NEGATIVE
LIPASE SERPL-CCNC: 22 U/L (ref 13–60)
LYMPHOCYTES # BLD AUTO: 1.45 10*3/MM3 (ref 0.7–3.1)
LYMPHOCYTES NFR BLD AUTO: 17.8 % (ref 19.6–45.3)
MAGNESIUM SERPL-MCNC: 2.1 MG/DL (ref 1.6–2.4)
MCH RBC QN AUTO: 31.7 PG (ref 26.6–33)
MCHC RBC AUTO-ENTMCNC: 33.4 G/DL (ref 31.5–35.7)
MCV RBC AUTO: 95 FL (ref 79–97)
MONOCYTES # BLD AUTO: 0.64 10*3/MM3 (ref 0.1–0.9)
MONOCYTES NFR BLD AUTO: 7.9 % (ref 5–12)
NEUTROPHILS NFR BLD AUTO: 5.96 10*3/MM3 (ref 1.7–7)
NEUTROPHILS NFR BLD AUTO: 73.3 % (ref 42.7–76)
NITRITE UR QL STRIP: NEGATIVE
NT-PROBNP SERPL-MCNC: 6156 PG/ML (ref 0–900)
PH UR STRIP.AUTO: 6.5 [PH] (ref 5–8)
PLATELET # BLD AUTO: 198 10*3/MM3 (ref 140–450)
PMV BLD AUTO: 10.2 FL (ref 6–12)
POTASSIUM SERPL-SCNC: 4.2 MMOL/L (ref 3.5–5.2)
PROT SERPL-MCNC: 6.9 G/DL (ref 6–8.5)
PROT UR QL STRIP: NEGATIVE
QT INTERVAL: 392 MS
QTC INTERVAL: 460 MS
RBC # BLD AUTO: 4.98 10*6/MM3 (ref 4.14–5.8)
SODIUM SERPL-SCNC: 142 MMOL/L (ref 136–145)
SP GR UR STRIP: <=1.005 (ref 1–1.03)
TROPONIN T % DELTA: -12
TROPONIN T NUMERIC DELTA: -5 NG/L
TROPONIN T SERPL HS-MCNC: 41 NG/L
UROBILINOGEN UR QL STRIP: ABNORMAL
WBC NRBC COR # BLD AUTO: 8.13 10*3/MM3 (ref 3.4–10.8)
WHOLE BLOOD HOLD COAG: NORMAL
WHOLE BLOOD HOLD SPECIMEN: NORMAL

## 2025-07-21 PROCEDURE — 96374 THER/PROPH/DIAG INJ IV PUSH: CPT

## 2025-07-21 PROCEDURE — 84484 ASSAY OF TROPONIN QUANT: CPT | Performed by: EMERGENCY MEDICINE

## 2025-07-21 PROCEDURE — 93005 ELECTROCARDIOGRAM TRACING: CPT | Performed by: EMERGENCY MEDICINE

## 2025-07-21 PROCEDURE — 25510000001 IOPAMIDOL PER 1 ML: Performed by: EMERGENCY MEDICINE

## 2025-07-21 PROCEDURE — 83880 ASSAY OF NATRIURETIC PEPTIDE: CPT | Performed by: EMERGENCY MEDICINE

## 2025-07-21 PROCEDURE — 71275 CT ANGIOGRAPHY CHEST: CPT

## 2025-07-21 PROCEDURE — G0378 HOSPITAL OBSERVATION PER HR: HCPCS

## 2025-07-21 PROCEDURE — 81003 URINALYSIS AUTO W/O SCOPE: CPT | Performed by: PHYSICIAN ASSISTANT

## 2025-07-21 PROCEDURE — 82948 REAGENT STRIP/BLOOD GLUCOSE: CPT

## 2025-07-21 PROCEDURE — 25010000002 HYDRALAZINE PER 20 MG: Performed by: PHYSICIAN ASSISTANT

## 2025-07-21 PROCEDURE — 96375 TX/PRO/DX INJ NEW DRUG ADDON: CPT

## 2025-07-21 PROCEDURE — 74177 CT ABD & PELVIS W/CONTRAST: CPT

## 2025-07-21 PROCEDURE — 36415 COLL VENOUS BLD VENIPUNCTURE: CPT

## 2025-07-21 PROCEDURE — 76705 ECHO EXAM OF ABDOMEN: CPT

## 2025-07-21 PROCEDURE — 83735 ASSAY OF MAGNESIUM: CPT

## 2025-07-21 PROCEDURE — 71045 X-RAY EXAM CHEST 1 VIEW: CPT

## 2025-07-21 PROCEDURE — 99285 EMERGENCY DEPT VISIT HI MDM: CPT | Performed by: EMERGENCY MEDICINE

## 2025-07-21 PROCEDURE — 85025 COMPLETE CBC W/AUTO DIFF WBC: CPT | Performed by: EMERGENCY MEDICINE

## 2025-07-21 PROCEDURE — 82948 REAGENT STRIP/BLOOD GLUCOSE: CPT | Performed by: PHYSICIAN ASSISTANT

## 2025-07-21 PROCEDURE — 83690 ASSAY OF LIPASE: CPT | Performed by: PHYSICIAN ASSISTANT

## 2025-07-21 PROCEDURE — 80053 COMPREHEN METABOLIC PANEL: CPT | Performed by: EMERGENCY MEDICINE

## 2025-07-21 PROCEDURE — 93005 ELECTROCARDIOGRAM TRACING: CPT

## 2025-07-21 PROCEDURE — 25010000002 FUROSEMIDE PER 20 MG

## 2025-07-21 RX ORDER — FUROSEMIDE 10 MG/ML
20 INJECTION INTRAMUSCULAR; INTRAVENOUS EVERY 12 HOURS
Status: DISCONTINUED | OUTPATIENT
Start: 2025-07-22 | End: 2025-07-21

## 2025-07-21 RX ORDER — SODIUM CHLORIDE 9 MG/ML
40 INJECTION, SOLUTION INTRAVENOUS AS NEEDED
Status: DISCONTINUED | OUTPATIENT
Start: 2025-07-21 | End: 2025-07-22 | Stop reason: HOSPADM

## 2025-07-21 RX ORDER — IBUPROFEN 600 MG/1
1 TABLET ORAL
Status: DISCONTINUED | OUTPATIENT
Start: 2025-07-21 | End: 2025-07-22 | Stop reason: HOSPADM

## 2025-07-21 RX ORDER — HYDRALAZINE HYDROCHLORIDE 20 MG/ML
10 INJECTION INTRAMUSCULAR; INTRAVENOUS ONCE
Status: COMPLETED | OUTPATIENT
Start: 2025-07-21 | End: 2025-07-21

## 2025-07-21 RX ORDER — INSULIN LISPRO 100 [IU]/ML
2-7 INJECTION, SOLUTION INTRAVENOUS; SUBCUTANEOUS
Status: DISCONTINUED | OUTPATIENT
Start: 2025-07-21 | End: 2025-07-22 | Stop reason: HOSPADM

## 2025-07-21 RX ORDER — TURMERIC 100 %
300 POWDER (GRAM) MISCELLANEOUS DAILY
COMMUNITY

## 2025-07-21 RX ORDER — OMEGA-3S/DHA/EPA/FISH OIL/D3 300MG-1000
400 CAPSULE ORAL DAILY
COMMUNITY

## 2025-07-21 RX ORDER — SODIUM CHLORIDE 0.9 % (FLUSH) 0.9 %
10 SYRINGE (ML) INJECTION AS NEEDED
Status: DISCONTINUED | OUTPATIENT
Start: 2025-07-21 | End: 2025-07-22 | Stop reason: HOSPADM

## 2025-07-21 RX ORDER — FUROSEMIDE 10 MG/ML
20 INJECTION INTRAMUSCULAR; INTRAVENOUS EVERY 12 HOURS
Status: DISCONTINUED | OUTPATIENT
Start: 2025-07-22 | End: 2025-07-22 | Stop reason: HOSPADM

## 2025-07-21 RX ORDER — CETIRIZINE HYDROCHLORIDE 10 MG/1
10 TABLET ORAL DAILY
COMMUNITY

## 2025-07-21 RX ORDER — ASCORBIC ACID 1000 MG
40 TABLET ORAL DAILY
COMMUNITY

## 2025-07-21 RX ORDER — GLIMEPIRIDE 4 MG/1
4 TABLET ORAL 2 TIMES DAILY
COMMUNITY

## 2025-07-21 RX ORDER — DEXTROSE MONOHYDRATE 25 G/50ML
25 INJECTION, SOLUTION INTRAVENOUS
Status: DISCONTINUED | OUTPATIENT
Start: 2025-07-21 | End: 2025-07-22 | Stop reason: HOSPADM

## 2025-07-21 RX ORDER — IOPAMIDOL 755 MG/ML
100 INJECTION, SOLUTION INTRAVASCULAR
Status: COMPLETED | OUTPATIENT
Start: 2025-07-21 | End: 2025-07-21

## 2025-07-21 RX ORDER — FUROSEMIDE 10 MG/ML
40 INJECTION INTRAMUSCULAR; INTRAVENOUS EVERY 12 HOURS
Status: DISCONTINUED | OUTPATIENT
Start: 2025-07-22 | End: 2025-07-21

## 2025-07-21 RX ORDER — GLIPIZIDE 10 MG/1
10 TABLET ORAL
Status: DISCONTINUED | OUTPATIENT
Start: 2025-07-22 | End: 2025-07-21

## 2025-07-21 RX ORDER — BUTYROSPERMUM PARKII(SHEA BUTTER), SIMMONDSIA CHINENSIS (JOJOBA) SEED OIL, ALOE BARBADENSIS LEAF EXTRACT .01; 1; 3.5 G/100G; G/100G; G/100G
200 LIQUID TOPICAL DAILY
COMMUNITY

## 2025-07-21 RX ORDER — METOPROLOL SUCCINATE 50 MG/1
50 TABLET, EXTENDED RELEASE ORAL DAILY
COMMUNITY

## 2025-07-21 RX ORDER — OMEPRAZOLE 40 MG/1
40 CAPSULE, DELAYED RELEASE ORAL DAILY
COMMUNITY

## 2025-07-21 RX ORDER — METOPROLOL SUCCINATE 25 MG/1
50 TABLET, EXTENDED RELEASE ORAL DAILY
Status: DISCONTINUED | OUTPATIENT
Start: 2025-07-21 | End: 2025-07-22 | Stop reason: HOSPADM

## 2025-07-21 RX ORDER — MAGNESIUM OXIDE 400 MG/1
250 TABLET ORAL DAILY
COMMUNITY

## 2025-07-21 RX ORDER — SODIUM CHLORIDE 0.9 % (FLUSH) 0.9 %
10 SYRINGE (ML) INJECTION EVERY 12 HOURS SCHEDULED
Status: DISCONTINUED | OUTPATIENT
Start: 2025-07-21 | End: 2025-07-22 | Stop reason: HOSPADM

## 2025-07-21 RX ORDER — NICOTINE POLACRILEX 4 MG
15 LOZENGE BUCCAL
Status: DISCONTINUED | OUTPATIENT
Start: 2025-07-21 | End: 2025-07-22 | Stop reason: HOSPADM

## 2025-07-21 RX ORDER — FUROSEMIDE 10 MG/ML
20 INJECTION INTRAMUSCULAR; INTRAVENOUS ONCE
Status: COMPLETED | OUTPATIENT
Start: 2025-07-21 | End: 2025-07-21

## 2025-07-21 RX ORDER — FUROSEMIDE 20 MG/1
20 TABLET ORAL EVERY OTHER DAY
COMMUNITY

## 2025-07-21 RX ORDER — PANTOPRAZOLE SODIUM 40 MG/1
40 TABLET, DELAYED RELEASE ORAL DAILY
Status: DISCONTINUED | OUTPATIENT
Start: 2025-07-22 | End: 2025-07-22 | Stop reason: HOSPADM

## 2025-07-21 RX ORDER — CETIRIZINE HYDROCHLORIDE 10 MG/1
10 TABLET ORAL DAILY
Status: DISCONTINUED | OUTPATIENT
Start: 2025-07-21 | End: 2025-07-22 | Stop reason: HOSPADM

## 2025-07-21 RX ADMIN — FUROSEMIDE 20 MG: 10 INJECTION, SOLUTION INTRAVENOUS at 14:01

## 2025-07-21 RX ADMIN — Medication 10 ML: at 23:46

## 2025-07-21 RX ADMIN — EMPAGLIFLOZIN 25 MG: 25 TABLET, FILM COATED ORAL at 16:30

## 2025-07-21 RX ADMIN — IOPAMIDOL 95 ML: 755 INJECTION, SOLUTION INTRAVENOUS at 12:38

## 2025-07-21 RX ADMIN — CETIRIZINE HYDROCHLORIDE 10 MG: 10 TABLET, FILM COATED ORAL at 16:30

## 2025-07-21 RX ADMIN — HYDRALAZINE HYDROCHLORIDE 10 MG: 20 INJECTION INTRAMUSCULAR; INTRAVENOUS at 18:58

## 2025-07-21 NOTE — H&P
[facility]    CDU ENCOUNTER      Pt Name: Flip Yu II  MRN: [id number]  YOB: 1955  Date of evaluation: 7/21/2025  Provider: Demetrice Larkin PA-C    CHIEF COMPLAINT       Chief Complaint   Patient presents with    Shortness of Breath    Weakness - Generalized     HISTORY OF PRESENT ILLNESS  (Location/Symptom, Timing/Onset, Context/Setting, Quality, Duration, Modifying Factors, Severity.)   Flip Yu II is a 70 y.o. male with past medical history of uncontrolled diabetes (recent Ha1c was 11.2) and hypertension presented to the emergency department with increased shortness of breath, worse today.  Patient states he feels like his abdomen is bloated.  Patient is a poor historian.  He states he takes Lasix but is unsure why.  He denies ever being told he has congestive heart failure.  He states he is unsure why he takes any of his medication.    Patient is currently followed by Dr. Boateng (Cardiologist).  Patient was admitted to the clinical decision unit for further diuresing and echocardiogram and stress test in the morning.    Nursing notes were reviewed.  REVIEW OF SYSTEMS    (2-9 systems for level 4, 10 or more for level 5)   Review of Systems   Constitutional:  Negative for chills and fever.   HENT:  Negative for ear pain and sore throat.    Respiratory:  Positive for shortness of breath. Negative for cough.    Cardiovascular:  Negative for chest pain.   Gastrointestinal:  Positive for abdominal distention. Negative for abdominal pain, diarrhea, nausea and vomiting.   Genitourinary:  Negative for dysuria and frequency.   Neurological:  Negative for dizziness and headaches.   All other systems reviewed and are negative.    All systems reviewed and negative except for those discussed in HPI.   PAST MEDICAL HISTORY     Past Medical History:   Diagnosis Date    Diabetes     Hypertension      SURGICAL HISTORY     History reviewed. No pertinent surgical history.    CURRENT MEDICATIONS       Current  Facility-Administered Medications:     cetirizine (zyrTEC) tablet 10 mg, 10 mg, Oral, Daily, Demetrice Larkin PA-C, 10 mg at 07/21/25 1630    dextrose (D50W) (25 g/50 mL) IV injection 25 g, 25 g, Intravenous, Q15 Min PRN, Demetrice Larkin PA-C    dextrose (GLUTOSE) oral gel 15 g, 15 g, Oral, Q15 Min PRN, Demetrice Larkin PA-C    empagliflozin (JARDIANCE) tablet 25 mg, 25 mg, Oral, Daily, Demetrice Larkin PA-C, 25 mg at 07/21/25 1630    [START ON 7/22/2025] furosemide (LASIX) injection 20 mg, 20 mg, Intravenous, Q12H, Demetrice Larkin PA-C    glucagon (GLUCAGEN) injection 1 mg, 1 mg, Intramuscular, Q15 Min PRN, Demetrice Larkin PA-C    Insulin Lispro (humaLOG) injection 2-7 Units, 2-7 Units, Subcutaneous, 4x Daily AC & at Bedtime, Demetrice Larkin PA-C    [Held by provider] metoprolol succinate XL (TOPROL-XL) 24 hr tablet 50 mg, 50 mg, Oral, Daily, Demetrice Larkin PA-C    [START ON 7/22/2025] pantoprazole (PROTONIX) EC tablet 40 mg, 40 mg, Oral, Daily, Demetrice Larkin PA-C    sodium chloride 0.9 % flush 10 mL, 10 mL, Intravenous, PRN, Demetrice Larkin PA-C    sodium chloride 0.9 % flush 10 mL, 10 mL, Intravenous, Q12H, Demetrice Larkin PA-C    sodium chloride 0.9 % flush 10 mL, 10 mL, Intravenous, PRN, Demetrice Larkin PA-C    sodium chloride 0.9 % infusion 40 mL, 40 mL, Intravenous, PRN, Demetrice Larkin PA-C    ALLERGIES     Lisinopril    FAMILY HISTORY       Family History   Problem Relation Age of Onset    No Known Problems Mother         SOCIAL HISTORY       Social History     Socioeconomic History    Marital status:    Tobacco Use    Smoking status: Never    Smokeless tobacco: Never   Vaping Use    Vaping status: Never Used   Substance and Sexual Activity    Alcohol use: Never    Drug use: Never    Sexual activity: Defer     PHYSICAL EXAM    (up to 7 for level 4, 8 or more for level 5)   Physical Exam  Vitals and nursing note reviewed.   Constitutional:       Appearance:  Normal appearance. He is obese.   HENT:      Head: Normocephalic and atraumatic.   Eyes:      Extraocular Movements: Extraocular movements intact.      Pupils: Pupils are equal, round, and reactive to light.   Cardiovascular:      Rate and Rhythm: Normal rate and regular rhythm.      Pulses: Normal pulses.   Pulmonary:      Effort: Pulmonary effort is normal.      Breath sounds: Normal breath sounds.   Abdominal:      General: Abdomen is flat. Bowel sounds are normal.      Palpations: Abdomen is soft.   Musculoskeletal:         General: Normal range of motion.   Skin:     General: Skin is warm and dry.   Neurological:      General: No focal deficit present.      Mental Status: He is alert and oriented to person, place, and time.   Psychiatric:         Mood and Affect: Mood normal.        DIAGNOSTIC RESULTS     EKG: All EKGs are interpreted by the Emergency Department Physician who either signs or Co-signs this chart in the absence of a cardiologist.    Telemetry Scan   Final Result      Telemetry Scan   Final Result      ECG 12 Lead Dyspnea   Preliminary Result   Test Reason : Dyspnea   Blood Pressure :   */*   mmHG   Vent. Rate :  83 BPM     Atrial Rate :  83 BPM      P-R Int : 192 ms          QRS Dur : 134 ms       QT Int : 392 ms       P-R-T Axes :  42 -37 127 degrees     QTcB Int : 460 ms      Normal sinus rhythm   Left axis deviation   Nonspecific intraventricular block   Minimal voltage criteria for LVH, may be normal variant ( [place] product    )   T wave abnormality, consider lateral ischemia   Abnormal ECG   When compared with ECG of 16-Mar-2025 19:39,   No significant change was found      Referred By:            Confirmed By:         RADIOLOGY:   Non-plain film images such as CT, Ultrasound and MRI are read by the radiologist. Plain radiographic images are visualized and preliminarily interpreted by the emergency physician with the below findings:    [x] Radiologist's Report Reviewed:  US Gallbladder    Final Result   Impression:   Cholelithiasis without sonographic evidence of acute cholecystitis.      Mildly increased hepatic echogenicity and coarse hepatic echotexture, which can be seen with hepatic steatosis and/or chronic liver disease.      Mild perihepatic ascites.            Electronically Signed: Arsen Emanuel MD     7/21/2025 2:12 PM EDT     Workstation ID: [id number]      CT Angiogram Chest Pulmonary Embolism   Final Result   Impression:   1.No evidence of pulmonary embolism.   2.Cardiomegaly with presumed mild pulmonary edema, small right effusion, and small volume free fluid in the abdomen and pelvis. Correlate for signs of CHF.   3.Cholelithiasis with mild nonspecific pericholecystic fluid. Correlate for signs of acute cholecystitis. Ultrasound or nuclear medicine hepatobiliary study might be useful for further assessment.            Electronically Signed: Anthony Miranda MD     7/21/2025 12:56 PM EDT     Workstation ID: [id number]      CT Abdomen Pelvis With Contrast   Final Result   Impression:   1.No evidence of pulmonary embolism.   2.Cardiomegaly with presumed mild pulmonary edema, small right effusion, and small volume free fluid in the abdomen and pelvis. Correlate for signs of CHF.   3.Cholelithiasis with mild nonspecific pericholecystic fluid. Correlate for signs of acute cholecystitis. Ultrasound or nuclear medicine hepatobiliary study might be useful for further assessment.            Electronically Signed: Anthony Miranda MD     7/21/2025 12:56 PM EDT     Workstation ID: [id number]      XR Chest 1 View   Final Result   Impression:   No acute process identified.            Electronically Signed: Anthony Miranda MD     7/21/2025 12:00 PM EDT     Workstation ID: [id number]      XR Chest 1 View    (Results Pending)     ED BEDSIDE ULTRASOUND:   Performed by ED Physician - none    LABS:    I have reviewed and interpreted all of the currently available lab results from this visit (if applicable):  Results  for orders placed or performed during the hospital encounter of 07/21/25   ECG 12 Lead Dyspnea    Collection Time: 07/21/25 11:33 AM   Result Value Ref Range    QT Interval 392 ms    QTC Interval 460 ms   Comprehensive Metabolic Panel    Collection Time: 07/21/25 11:39 AM    Specimen: Blood   Result Value Ref Range    Glucose 255 (H) 65 - 99 mg/dL    BUN 20.4 8.0 - 23.0 mg/dL    Creatinine 0.89 0.76 - 1.27 mg/dL    Sodium 142 136 - 145 mmol/L    Potassium 4.2 3.5 - 5.2 mmol/L    Chloride 104 98 - 107 mmol/L    CO2 25.1 22.0 - 29.0 mmol/L    Calcium 9.7 8.6 - 10.5 mg/dL    Total Protein 6.9 6.0 - 8.5 g/dL    Albumin 4.0 3.5 - 5.2 g/dL    ALT (SGPT) 55 (H) 1 - 41 U/L    AST (SGOT) 31 1 - 40 U/L    Alkaline Phosphatase 112 39 - 117 U/L    Total Bilirubin 1.1 0.0 - 1.2 mg/dL    Globulin 2.9 gm/dL    A/G Ratio 1.4 g/dL    BUN/Creatinine Ratio 22.9 7.0 - 25.0    Anion Gap 12.9 5.0 - 15.0 mmol/L    eGFR 91.1 >60.0 mL/min/1.73   BNP    Collection Time: 07/21/25 11:39 AM    Specimen: Blood   Result Value Ref Range    proBNP 6,156.0 (H) 0.0 - 900.0 pg/mL   High Sensitivity Troponin T    Collection Time: 07/21/25 11:39 AM    Specimen: Blood   Result Value Ref Range    HS Troponin T 41 (H) <22 ng/L   CBC Auto Differential    Collection Time: 07/21/25 11:39 AM    Specimen: Blood   Result Value Ref Range    WBC 8.13 3.40 - 10.80 10*3/mm3    RBC 4.98 4.14 - 5.80 10*6/mm3    Hemoglobin 15.8 13.0 - 17.7 g/dL    Hematocrit 47.3 37.5 - 51.0 %    MCV 95.0 79.0 - 97.0 fL    MCH 31.7 26.6 - 33.0 pg    MCHC 33.4 31.5 - 35.7 g/dL    RDW 13.4 12.3 - 15.4 %    RDW-SD 46.5 37.0 - 54.0 fl    MPV 10.2 6.0 - 12.0 fL    Platelets 198 140 - 450 10*3/mm3    Neutrophil % 73.3 42.7 - 76.0 %    Lymphocyte % 17.8 (L) 19.6 - 45.3 %    Monocyte % 7.9 5.0 - 12.0 %    Eosinophil % 0.5 0.3 - 6.2 %    Basophil % 0.4 0.0 - 1.5 %    Immature Grans % 0.1 0.0 - 0.5 %    Neutrophils, Absolute 5.96 1.70 - 7.00 10*3/mm3    Lymphocytes, Absolute 1.45 0.70 - 3.10  10*3/mm3    Monocytes, Absolute 0.64 0.10 - 0.90 10*3/mm3    Eosinophils, Absolute 0.04 0.00 - 0.40 10*3/mm3    Basophils, Absolute 0.03 0.00 - 0.20 10*3/mm3    Immature Grans, Absolute 0.01 0.00 - 0.05 10*3/mm3   Green Top (Gel)    Collection Time: 07/21/25 11:39 AM   Result Value Ref Range    Extra Tube Hold for add-ons.    Lavender Top    Collection Time: 07/21/25 11:39 AM   Result Value Ref Range    Extra Tube hold for add-on    Gold Top - SST    Collection Time: 07/21/25 11:39 AM   Result Value Ref Range    Extra Tube Hold for add-ons.    Gray Top    Collection Time: 07/21/25 11:39 AM   Result Value Ref Range    Extra Tube Hold for add-ons.    Light Blue Top    Collection Time: 07/21/25 11:39 AM   Result Value Ref Range    Extra Tube Hold for add-ons.    High Sensitivity Troponin T 1Hr    Collection Time: 07/21/25  1:00 PM    Specimen: Blood   Result Value Ref Range    HS Troponin T 36 (H) <22 ng/L    Troponin T Numeric Delta -5 ng/L    Troponin T % Delta -12 Abnormal if >/= 20%   Magnesium    Collection Time: 07/21/25  1:00 PM    Specimen: Blood   Result Value Ref Range    Magnesium 2.1 1.6 - 2.4 mg/dL   POC Glucose 4x Daily Before Meals & at Bedtime    Collection Time: 07/21/25  5:46 PM    Specimen: Blood   Result Value Ref Range    Glucose 162 (H) 70 - 130 mg/dL      All other labs were within normal range or not returned as of this dictation.    EMERGENCY DEPARTMENT COURSE and DIFFERENTIAL DIAGNOSIS/MDM:   Vitals:    Vitals:    07/21/25 1618 07/21/25 1650 07/21/25 1702 07/21/25 1800   BP:   (!) 168/108    BP Location:   Left arm    Patient Position:   Lying    Pulse:   80 82   Resp:       Temp:       TempSrc:       SpO2: 98% 99%  93%   Weight:       Height:         ED Course as of 07/21/25 1812 Mon Jul 21, 2025 1217 An EKG was ordered, reviewed, and interpreted by myself:  Rate: 83.  Rhythm: Sinus rhythm with left axis deviation  QTc: 460  ST elevation: n. STEMI: N       [LENA]      ED Course User  Index  [LENA] Eliu Velez MD     [facility]  Ddx: CHF, NSTEMI    Patient will be admitted to the clinical decision unit overnight for continued diuresis and echocardiogram and stress test in the morning.    Futher treatment will be provided by Ansley Burton PA-C as of 7:00pm.    MEDICATIONS ADMINISTERED IN ED:  Medications   sodium chloride 0.9 % flush 10 mL (has no administration in time range)   cetirizine (zyrTEC) tablet 10 mg (10 mg Oral Given 7/21/25 1630)   empagliflozin (JARDIANCE) tablet 25 mg (25 mg Oral Given 7/21/25 1630)   metoprolol succinate XL (TOPROL-XL) 24 hr tablet 50 mg ( Oral Dose Auto Held 7/29/25 0900)   pantoprazole (PROTONIX) EC tablet 40 mg (has no administration in time range)   sodium chloride 0.9 % flush 10 mL (has no administration in time range)   sodium chloride 0.9 % flush 10 mL (has no administration in time range)   sodium chloride 0.9 % infusion 40 mL (has no administration in time range)   dextrose (GLUTOSE) oral gel 15 g (has no administration in time range)   dextrose (D50W) (25 g/50 mL) IV injection 25 g (has no administration in time range)   glucagon (GLUCAGEN) injection 1 mg (has no administration in time range)   Insulin Lispro (humaLOG) injection 2-7 Units ( Subcutaneous Not Given 7/21/25 1802)   furosemide (LASIX) injection 20 mg (has no administration in time range)   iopamidol (ISOVUE-370) 76 % injection 100 mL (95 mL Intravenous Given 7/21/25 1238)   furosemide (LASIX) injection 20 mg (20 mg Intravenous Given 7/21/25 1401)       PROCEDURES:  Procedures:none      CRITICAL CARE TIME    Total Critical Care time was 0 minutes, excluding separately reportable procedures.   There was a high probability of clinically significant/life threatening deterioration in the patient's condition which required my urgent intervention.    ADMISSION IMPRESSION      1. Other congestive heart failure    2. Dyspnea, unspecified type        DISPOSITION/PLAN     1) New Onset CHF   A) Echo in  the AM.   B) Lasix 20mg q 12 hrs.    2) Dyspnea on Exertion   A) Stress test in the AM.   B) Repeat CXR in the AM.    PATIENT REFERRED TO:  No follow-up provider specified.    ADMISSION MEDICATIONS:     Medication List        CONTINUE taking these medications      acetaminophen 325 MG tablet  Commonly known as: TYLENOL  Take 2 tablets by mouth Every 6 (Six) Hours As Needed for Mild Pain .     albuterol sulfate  (90 Base) MCG/ACT inhaler  Commonly known as: PROVENTIL HFA;VENTOLIN HFA;PROAIR HFA  Inhale 2 puffs Every 4 (Four) Hours As Needed for Wheezing.            ASK your doctor about these medications      cetirizine 10 MG tablet  Commonly known as: zyrTEC     cholecalciferol 10 MCG (400 UNIT) tablet  Commonly known as: VITAMIN D3     CoQmax 200 MG capsule  Generic drug: Ubiquinol     furosemide 20 MG tablet  Commonly known as: LASIX  Ask about: Which instructions should I use?     Ginkoba 40 MG tablet  Generic drug: Ginkgo Biloba     glimepiride 4 MG tablet  Commonly known as: AMARYL     Jardiance 25 MG tablet tablet  Generic drug: empagliflozin     magnesium oxide 400 MG tablet  Commonly known as: MAG-OX     metoprolol succinate XL 50 MG 24 hr tablet  Commonly known as: TOPROL-XL     NON FORMULARY     omeprazole 40 MG capsule  Commonly known as: priLOSEC     PROSTATE 2.4 PO     Turmeric Root powder     Unable to find     vitamin E 100 UNIT capsule     Zinc Acetate 50 MG capsule            Comment: Please note this report has been produced using speech recognition software.    Demetrice Larkin PA-C

## 2025-07-21 NOTE — ED NOTES
.. Flip Yu II    Nursing Report ED to Floor:  Mental status: A&O x 4  Ambulatory status: Ambulates  Oxygen Therapy:  none  Cardiac Rhythm: NSR  Admitted from: Home  Safety Concerns:  None  Precautions: None  Social Issues: None  ED Room #:  17    ED Nurse Phone Extension - [phone] or may call [phone].      HPI:   Chief Complaint   Patient presents with    Shortness of Breath    Weakness - Generalized       Past Medical History:  Past Medical History:   Diagnosis Date    Diabetes     Hypertension         Past Surgical History:  History reviewed. No pertinent surgical history.     Admitting Doctor:   No admitting provider for patient encounter.    Consulting Provider(s):  Consults       No orders found from 6/22/2025 to 7/22/2025.             Admitting Diagnosis:   The primary encounter diagnosis was Other congestive heart failure. A diagnosis of Dyspnea, unspecified type was also pertinent to this visit.    Most Recent Vitals:   Vitals:    07/21/25 1300 07/21/25 1311 07/21/25 1330 07/21/25 1400   BP:  (!) 170/111 (!) 172/111    BP Location:       Patient Position:       Pulse: 85 81 80 85   Resp:       Temp:       TempSrc:       SpO2: 95% 96% 94% 94%   Weight:       Height:           Active LDAs/IV Access:   Lines, Drains & Airways       Active LDAs       Name Placement date Placement time Site Days    Peripheral IV 07/21/25 1140 20 G Right Antecubital 07/21/25  1140  Antecubital  less than 1                    Labs (abnormal labs have a star):   Labs Reviewed   COMPREHENSIVE METABOLIC PANEL - Abnormal; Notable for the following components:       Result Value    Glucose 255 (*)     ALT (SGPT) 55 (*)     All other components within normal limits    Narrative:     GFR Categories in Chronic Kidney Disease (CKD)              GFR Category          GFR (mL/min/1.73)    Interpretation  G1                    90 or greater        Normal or high (1)  G2                    60-89                Mild decrease (1)  G3a                    45-59                Mild to moderate decrease  G3b                   30-44                Moderate to severe decrease  G4                    15-29                Severe decrease  G5                    14 or less           Kidney failure    (1)In the absence of evidence of kidney disease, neither GFR category G1 or G2 fulfill the criteria for CKD.    eGFR calculation 2021 CKD-EPI creatinine equation, which does not include race as a factor   BNP (IN-HOUSE) - Abnormal; Notable for the following components:    proBNP 6,156.0 (*)     All other components within normal limits    Narrative:     This assay is used as an aid in the diagnosis of individuals suspected of having heart failure. It can be used as an aid in the diagnosis of acute decompensated heart failure (ADHF) in patients presenting with signs and symptoms of ADHF to the emergency department (ED). In addition, NT-proBNP of <300 pg/mL indicates ADHF is not likely.    Age Range Result Interpretation  NT-proBNP Concentration (pg/mL:      <50             Positive            >450                   Gray                 300-450                    Negative             <300    50-75           Positive            >900                  Gray                300-900                  Negative            <300      >75             Positive            >1800                  Gray                300-1800                  Negative            <300   TROPONIN - Abnormal; Notable for the following components:    HS Troponin T 41 (*)     All other components within normal limits   CBC WITH AUTO DIFFERENTIAL - Abnormal; Notable for the following components:    Lymphocyte % 17.8 (*)     All other components within normal limits   HIGH SENSITIVITIY TROPONIN T 1HR - Abnormal; Notable for the following components:    HS Troponin T 36 (*)     All other components within normal limits    Narrative:     High Sensitive Troponin T Reference Range:  <14.0 ng/L- Negative Female for  AMI  <22.0 ng/L- Negative Male for AMI  >=14 - Abnormal Female indicating possible myocardial injury.  >=22 - Abnormal Male indicating possible myocardial injury.   Clinicians would have to utilize clinical acumen, EKG, Troponin, and serial changes to determine if it is an Acute Myocardial Infarction or myocardial injury due to an underlying chronic condition.        MAGNESIUM - Normal   RAINBOW DRAW    Narrative:     The following orders were created for panel order [place] Draw.  Procedure                               Abnormality         Status                     ---------                               -----------         ------                     Green Top (Gel)[[id number]]                                  Final result               Lavender Top[[id number]]                                     Final result               Gold Top - SST[[id number]]                                   Final result               Ferrell Top[[id number]]                                         Final result               Light Blue Top[[id number]]                                   Final result                 Please view results for these tests on the individual orders.   CBC AND DIFFERENTIAL    Narrative:     The following orders were created for panel order CBC & Differential.  Procedure                               Abnormality         Status                     ---------                               -----------         ------                     CBC Auto Differential[[id number]]        Abnormal            Final result                 Please view results for these tests on the individual orders.   GREEN TOP   LAVENDER TOP   GOLD TOP - SST   GRAY TOP   LIGHT BLUE TOP       Meds Given in ED:   Medications   sodium chloride 0.9 % flush 10 mL (has no administration in time range)   iopamidol (ISOVUE-370) 76 % injection 100 mL (95 mL Intravenous Given 7/21/25 1238)   furosemide (LASIX) injection 20 mg (20 mg Intravenous Given 7/21/25 1401)            Last NIH score:                                                          Dysphagia screening results:        [place] Coma Scale:  No data recorded     CIWA:        Restraint Type:            Isolation Status:  No active isolations         There are no Wet Read(s) to document.

## 2025-07-21 NOTE — PLAN OF CARE
"Goal Outcome Evaluation:      Pt's oxygen saturation decreased while patient was sleeping. Oxygen applied only while pt is asleep. O2 sat >90% on room air while awake. Pt states that he is ordered to complete a sleep study for sleep apnea. Dtr states that he has been needing to complete sleep study \"for a while\" but has not done it. Discussed with pt the need to follow up with sleep study for sleep apnea as ordered. Also, patient had many questions regarding his medications: what the medication is for, how often he should be taking them, and concerns over potential side effects. Discussed with pt/dtr at length regarding his medications and instructed pt to speak with his PCP regarding his medications.     Pt is ambulating in room without difficulty. Tolerating PO. Family present at bedside. No SOA noted or voiced.                                       "

## 2025-07-22 ENCOUNTER — APPOINTMENT (OUTPATIENT)
Facility: HOSPITAL | Age: 70
End: 2025-07-22
Payer: MEDICARE

## 2025-07-22 VITALS
DIASTOLIC BLOOD PRESSURE: 84 MMHG | TEMPERATURE: 97.8 F | RESPIRATION RATE: 14 BRPM | OXYGEN SATURATION: 97 % | BODY MASS INDEX: 31.41 KG/M2 | HEART RATE: 90 BPM | HEIGHT: 74 IN | WEIGHT: 244.71 LBS | SYSTOLIC BLOOD PRESSURE: 156 MMHG

## 2025-07-22 LAB
ALBUMIN SERPL-MCNC: 3.7 G/DL (ref 3.5–5.2)
ALBUMIN/GLOB SERPL: 1.4 G/DL
ALP SERPL-CCNC: 100 U/L (ref 39–117)
ALT SERPL W P-5'-P-CCNC: 44 U/L (ref 1–41)
ANION GAP SERPL CALCULATED.3IONS-SCNC: 11.9 MMOL/L (ref 5–15)
AORTIC DIMENSIONLESS INDEX: 0.47 (DI)
AST SERPL-CCNC: 26 U/L (ref 1–40)
AV MEAN PRESS GRAD SYS DOP V1V2: 5 MMHG
AV VMAX SYS DOP: 155 CM/SEC
BASOPHILS # BLD AUTO: 0.02 10*3/MM3 (ref 0–0.2)
BASOPHILS NFR BLD AUTO: 0.2 % (ref 0–1.5)
BH CV ECHO MEAS - AO MAX PG: 9.6 MMHG
BH CV ECHO MEAS - AO ROOT DIAM: 3.8 CM
BH CV ECHO MEAS - AO V2 VTI: 29.5 CM
BH CV ECHO MEAS - AVA(I,D): 1.79 CM2
BH CV ECHO MEAS - EDV(CUBED): 238.3 ML
BH CV ECHO MEAS - EDV(MOD-SP2): 240 ML
BH CV ECHO MEAS - EDV(MOD-SP4): 171 ML
BH CV ECHO MEAS - EF(MOD-SP2): 30.4 %
BH CV ECHO MEAS - EF(MOD-SP4): 27.5 %
BH CV ECHO MEAS - ESV(CUBED): 190.1 ML
BH CV ECHO MEAS - ESV(MOD-SP2): 167 ML
BH CV ECHO MEAS - ESV(MOD-SP4): 124 ML
BH CV ECHO MEAS - FS: 7.3 %
BH CV ECHO MEAS - IVS/LVPW: 1.32 CM
BH CV ECHO MEAS - IVSD: 1.25 CM
BH CV ECHO MEAS - LA DIMENSION: 4.8 CM
BH CV ECHO MEAS - LAT PEAK E' VEL: 7.7 CM/SEC
BH CV ECHO MEAS - LV DIASTOLIC VOL/BSA (35-75): 72.3 CM2
BH CV ECHO MEAS - LV MASS(C)D: 295.4 GRAMS
BH CV ECHO MEAS - LV MAX PG: 1.55 MMHG
BH CV ECHO MEAS - LV MEAN PG: 1 MMHG
BH CV ECHO MEAS - LV SYSTOLIC VOL/BSA (12-30): 52.4 CM2
BH CV ECHO MEAS - LV V1 MAX: 62.3 CM/SEC
BH CV ECHO MEAS - LV V1 VTI: 13.9 CM
BH CV ECHO MEAS - LVIDD: 6.2 CM
BH CV ECHO MEAS - LVIDS: 5.8 CM
BH CV ECHO MEAS - LVOT AREA: 3.8 CM2
BH CV ECHO MEAS - LVOT DIAM: 2.2 CM
BH CV ECHO MEAS - LVPWD: 0.95 CM
BH CV ECHO MEAS - MED PEAK E' VEL: 7.6 CM/SEC
BH CV ECHO MEAS - MV A MAX VEL: 49.3 CM/SEC
BH CV ECHO MEAS - MV DEC SLOPE: 509 CM/SEC2
BH CV ECHO MEAS - MV DEC TIME: 0.16 SEC
BH CV ECHO MEAS - MV E MAX VEL: 79.7 CM/SEC
BH CV ECHO MEAS - MV E/A: 1.62
BH CV ECHO MEAS - MV MAX PG: 3.9 MMHG
BH CV ECHO MEAS - MV MEAN PG: 2 MMHG
BH CV ECHO MEAS - MV V2 VTI: 33.8 CM
BH CV ECHO MEAS - MVA(VTI): 1.56 CM2
BH CV ECHO MEAS - PA ACC TIME: 0.11 SEC
BH CV ECHO MEAS - PA V2 MAX: 87.8 CM/SEC
BH CV ECHO MEAS - SV(LVOT): 52.8 ML
BH CV ECHO MEAS - SV(MOD-SP2): 73 ML
BH CV ECHO MEAS - SV(MOD-SP4): 47 ML
BH CV ECHO MEAS - SVI(LVOT): 22.3 ML/M2
BH CV ECHO MEAS - SVI(MOD-SP2): 30.9 ML/M2
BH CV ECHO MEAS - SVI(MOD-SP4): 19.9 ML/M2
BH CV ECHO MEAS - TAPSE (>1.6): 1.91 CM
BH CV ECHO MEASUREMENTS AVERAGE E/E' RATIO: 10.42
BH CV REST NUCLEAR ISOTOPE DOSE: 9.9 MCI
BH CV STRESS BP STAGE 2: NORMAL
BH CV STRESS BP STAGE 4: NORMAL
BH CV STRESS COMMENTS STAGE 1: NORMAL
BH CV STRESS DOSE REGADENOSON STAGE 1: 0.4
BH CV STRESS DURATION MIN STAGE 1: 1
BH CV STRESS DURATION MIN STAGE 2: 1
BH CV STRESS DURATION MIN STAGE 3: 1
BH CV STRESS DURATION MIN STAGE 4: 1
BH CV STRESS HR STAGE 1: 64
BH CV STRESS HR STAGE 2: 86
BH CV STRESS HR STAGE 3: 90
BH CV STRESS HR STAGE 4: 86
BH CV STRESS NUCLEAR ISOTOPE DOSE: 29.7 MCI
BH CV STRESS O2 STAGE 1: 95
BH CV STRESS O2 STAGE 2: 94
BH CV STRESS O2 STAGE 3: 97
BH CV STRESS O2 STAGE 4: 94
BH CV STRESS PROTOCOL 1: NORMAL
BH CV STRESS RECOVERY BP: NORMAL MMHG
BH CV STRESS RECOVERY HR: 75 BPM
BH CV STRESS RECOVERY O2: 93 %
BH CV STRESS STAGE 1: 1
BH CV STRESS STAGE 2: 2
BH CV STRESS STAGE 3: 3
BH CV STRESS STAGE 4: 4
BH CV VAS BP RIGHT ARM: NORMAL MMHG
BH CV XLRA - RV BASE: 3.1 CM
BH CV XLRA - RV LENGTH: 7.7 CM
BH CV XLRA - RV MID: 2.8 CM
BH CV XLRA - TDI S': 19.5 CM/SEC
BILIRUB SERPL-MCNC: 1 MG/DL (ref 0–1.2)
BUN SERPL-MCNC: 16.1 MG/DL (ref 8–23)
BUN/CREAT SERPL: 21.8 (ref 7–25)
CALCIUM SPEC-SCNC: 9.6 MG/DL (ref 8.6–10.5)
CHLORIDE SERPL-SCNC: 104 MMOL/L (ref 98–107)
CO2 SERPL-SCNC: 26.1 MMOL/L (ref 22–29)
CREAT SERPL-MCNC: 0.74 MG/DL (ref 0.76–1.27)
DEPRECATED RDW RBC AUTO: 47.4 FL (ref 37–54)
EGFRCR SERPLBLD CKD-EPI 2021: 96.3 ML/MIN/1.73
EOSINOPHIL # BLD AUTO: 0.18 10*3/MM3 (ref 0–0.4)
EOSINOPHIL NFR BLD AUTO: 2.2 % (ref 0.3–6.2)
ERYTHROCYTE [DISTWIDTH] IN BLOOD BY AUTOMATED COUNT: 13.4 % (ref 12.3–15.4)
GLOBULIN UR ELPH-MCNC: 2.7 GM/DL
GLUCOSE BLDC GLUCOMTR-MCNC: 145 MG/DL (ref 70–130)
GLUCOSE SERPL-MCNC: 151 MG/DL (ref 65–99)
HCT VFR BLD AUTO: 46.5 % (ref 37.5–51)
HGB BLD-MCNC: 15 G/DL (ref 13–17.7)
IMM GRANULOCYTES # BLD AUTO: 0.02 10*3/MM3 (ref 0–0.05)
IMM GRANULOCYTES NFR BLD AUTO: 0.2 % (ref 0–0.5)
IVRT: 106 MS
LEFT ATRIUM VOLUME INDEX: 39.2 ML/M2
LV EF BIPLANE MOD: 26.6 %
LYMPHOCYTES # BLD AUTO: 1.37 10*3/MM3 (ref 0.7–3.1)
LYMPHOCYTES NFR BLD AUTO: 17 % (ref 19.6–45.3)
MAXIMAL PREDICTED HEART RATE: 148 BPM
MCH RBC QN AUTO: 31.4 PG (ref 26.6–33)
MCHC RBC AUTO-ENTMCNC: 32.3 G/DL (ref 31.5–35.7)
MCV RBC AUTO: 97.3 FL (ref 79–97)
MONOCYTES # BLD AUTO: 0.7 10*3/MM3 (ref 0.1–0.9)
MONOCYTES NFR BLD AUTO: 8.7 % (ref 5–12)
NEUTROPHILS NFR BLD AUTO: 5.79 10*3/MM3 (ref 1.7–7)
NEUTROPHILS NFR BLD AUTO: 71.7 % (ref 42.7–76)
PERCENT MAX PREDICTED HR: 62.84 %
PLATELET # BLD AUTO: 192 10*3/MM3 (ref 140–450)
PMV BLD AUTO: 10 FL (ref 6–12)
POTASSIUM SERPL-SCNC: 3.8 MMOL/L (ref 3.5–5.2)
PROT SERPL-MCNC: 6.4 G/DL (ref 6–8.5)
RBC # BLD AUTO: 4.78 10*6/MM3 (ref 4.14–5.8)
SODIUM SERPL-SCNC: 142 MMOL/L (ref 136–145)
SPECT HRT GATED+EF W RNC IV: 36 %
STRESS BASELINE BP: NORMAL MMHG
STRESS BASELINE HR: 75 BPM
STRESS O2 SAT REST: 96 %
STRESS PERCENT HR: 74 %
STRESS POST EXERCISE DUR MIN: 4 MIN
STRESS POST EXERCISE DUR SEC: 0 SEC
STRESS POST O2 SAT PEAK: 94 %
STRESS POST PEAK BP: NORMAL MMHG
STRESS POST PEAK HR: 93 BPM
STRESS TARGET HR: 126 BPM
WBC NRBC COR # BLD AUTO: 8.08 10*3/MM3 (ref 3.4–10.8)

## 2025-07-22 PROCEDURE — G0378 HOSPITAL OBSERVATION PER HR: HCPCS

## 2025-07-22 PROCEDURE — 93016 CV STRESS TEST SUPVJ ONLY: CPT | Performed by: INTERNAL MEDICINE

## 2025-07-22 PROCEDURE — 34310000005 TECHNETIUM SESTAMIBI: Performed by: PHYSICIAN ASSISTANT

## 2025-07-22 PROCEDURE — 96376 TX/PRO/DX INJ SAME DRUG ADON: CPT

## 2025-07-22 PROCEDURE — A9500 TC99M SESTAMIBI: HCPCS | Performed by: PHYSICIAN ASSISTANT

## 2025-07-22 PROCEDURE — 78452 HT MUSCLE IMAGE SPECT MULT: CPT

## 2025-07-22 PROCEDURE — 25010000002 FUROSEMIDE PER 20 MG: Performed by: PHYSICIAN ASSISTANT

## 2025-07-22 PROCEDURE — 78452 HT MUSCLE IMAGE SPECT MULT: CPT | Performed by: INTERNAL MEDICINE

## 2025-07-22 PROCEDURE — 93306 TTE W/DOPPLER COMPLETE: CPT | Performed by: INTERNAL MEDICINE

## 2025-07-22 PROCEDURE — 93306 TTE W/DOPPLER COMPLETE: CPT

## 2025-07-22 PROCEDURE — 85025 COMPLETE CBC W/AUTO DIFF WBC: CPT | Performed by: PHYSICIAN ASSISTANT

## 2025-07-22 PROCEDURE — 71045 X-RAY EXAM CHEST 1 VIEW: CPT

## 2025-07-22 PROCEDURE — 80053 COMPREHEN METABOLIC PANEL: CPT | Performed by: PHYSICIAN ASSISTANT

## 2025-07-22 PROCEDURE — 93018 CV STRESS TEST I&R ONLY: CPT | Performed by: INTERNAL MEDICINE

## 2025-07-22 PROCEDURE — 25010000002 SULFUR HEXAFLUORIDE MICROSPH 60.7-25 MG RECONSTITUTED SUSPENSION: Performed by: PHYSICIAN ASSISTANT

## 2025-07-22 PROCEDURE — 25010000002 REGADENOSON 0.4 MG/5ML SOLUTION: Performed by: PHYSICIAN ASSISTANT

## 2025-07-22 PROCEDURE — 93017 CV STRESS TEST TRACING ONLY: CPT

## 2025-07-22 PROCEDURE — 82948 REAGENT STRIP/BLOOD GLUCOSE: CPT

## 2025-07-22 RX ORDER — POTASSIUM CHLORIDE 750 MG/1
20 CAPSULE, EXTENDED RELEASE ORAL AS NEEDED
Qty: 60 CAPSULE | Refills: 0 | Status: SHIPPED | OUTPATIENT
Start: 2025-07-22

## 2025-07-22 RX ORDER — NITROGLYCERIN 0.4 MG/1
0.4 TABLET SUBLINGUAL
Qty: 25 TABLET | Refills: 0 | Status: SHIPPED | OUTPATIENT
Start: 2025-07-22

## 2025-07-22 RX ORDER — REGADENOSON 0.08 MG/ML
0.4 INJECTION, SOLUTION INTRAVENOUS ONCE
Status: COMPLETED | OUTPATIENT
Start: 2025-07-22 | End: 2025-07-22

## 2025-07-22 RX ADMIN — CETIRIZINE HYDROCHLORIDE 10 MG: 10 TABLET, FILM COATED ORAL at 11:24

## 2025-07-22 RX ADMIN — TECHNETIUM TC 99M SESTAMIBI 1 DOSE: 1 INJECTION INTRAVENOUS at 10:10

## 2025-07-22 RX ADMIN — EMPAGLIFLOZIN 25 MG: 25 TABLET, FILM COATED ORAL at 11:24

## 2025-07-22 RX ADMIN — FUROSEMIDE 20 MG: 10 INJECTION, SOLUTION INTRAMUSCULAR; INTRAVENOUS at 11:24

## 2025-07-22 RX ADMIN — TECHNETIUM TC 99M SESTAMIBI 1 DOSE: 1 INJECTION INTRAVENOUS at 08:30

## 2025-07-22 RX ADMIN — PANTOPRAZOLE SODIUM 40 MG: 40 TABLET, DELAYED RELEASE ORAL at 11:24

## 2025-07-22 RX ADMIN — SULFUR HEXAFLUORIDE 2 ML: KIT at 09:52

## 2025-07-22 RX ADMIN — REGADENOSON 0.4 MG: 0.08 INJECTION INTRAVENOUS at 10:06

## 2025-07-22 NOTE — DISCHARGE INSTRUCTIONS
Follow-up with your PCP and your cardiologist for recheck of symptoms.  Please call your cardiologist today to set up an appointment for recheck.  Please weigh yourself daily and keep track of weight log.  Your cardiologist recommends that you increase your dose of Lasix (Furosemide) to 40 mg if you weigh 4 pounds or more of your normal weight.  If you do take 40 mg of Lasix please take 20 meq of potassium chloride.  You have also been given a prescription for nitroglycerin to take up to 3 times with 5 minutes in between each dose for active chest pain.  Please call 911 and get to a hospital if this occurs.  Have low threshold to return the Emergency Department for new, worsening, concerning symptoms.

## 2025-07-22 NOTE — PLAN OF CARE
Goal Outcome Evaluation:  Plan of Care Reviewed With: patient        Progress: improving        Patient alert and oriented x4. PRN Hydralazine was given early in the night shift and was effective for most of the night. Hypertensive early this am with all other VSS. No complaint of discomfort. He has slept well between care rounds with 2 L O2 per nasal cannula. Up to BR independently. Plan is for repeat labs and CXR this morning as well as Stress test and Echo. If results normal and patient medically ready then will dc home later today.

## 2025-07-22 NOTE — [facility] PROVIDER NOTE
"Subjective  History of Present Illness:    Patient is a 70-year-old male past medical history of diabetes, hypertension, was told he has an enlarged heart per daughter presents with continued shortness of breath.  Patient daughter state he had an episode like this a couple months ago and was evaluated, he followed up with his cardiologist recently who recommended he get a stress test and echo but he has not done so.  Patient states he struggles lying flat and gets short of breath when he exerts himself.  Patient states he also feels like he has to cough up something but is unable to do so.  Patient denies chest pain, abdominal pain, nausea, vomiting, hemoptysis, diarrhea, fever.      Nurses Notes reviewed and agree, including vitals, allergies, social history and prior medical history.     REVIEW OF SYSTEMS: All systems reviewed and not pertinent unless noted.  Review of Systems    Past Medical History:   Diagnosis Date    Diabetes     Hypertension        Allergies:    Lisinopril      History reviewed. No pertinent surgical history.      Social History     Socioeconomic History    Marital status:    Tobacco Use    Smoking status: Never    Smokeless tobacco: Never   Vaping Use    Vaping status: Never Used   Substance and Sexual Activity    Alcohol use: Never    Drug use: Never    Sexual activity: Defer         Family History   Problem Relation Age of Onset    No Known Problems Mother        Objective  Physical Exam:  /77   Pulse 85   Temp 98.7 °F (37.1 °C) (Oral)   Resp 22   Ht 188 cm (74\")   Wt 111 kg (244 lb)   SpO2 97%   BMI 31.33 kg/m²      Physical Exam  Constitutional:       Appearance: Well-developed. No acute distress  HENT:      Head: Normocephalic and atraumatic.      Mouth/Throat:      Mouth: Mucous membranes are moist.      Eyes:      Extraocular Movements: Extraocular movements intact.   Cardiovascular:      Rate and Rhythm: Normal rate and regular rhythm.      Heart sounds: Normal " heart sounds.   Palpation of the chest.  No peripheral edema noted  Pulmonary:      Effort: Pulmonary effort is normal. No respiratory distress.      Breath sounds: Mildly decreased breath sounds bibasilar.  Abdominal:      General: There is no distension.      Palpations: Abdomen is soft and nontender. No rebound tenderness or guarding noted  Musculoskeletal:         General: No swelling or tenderness.       Extremities: Moves all 4s   Skin:     General: Skin is warm and dry.      Capillary Refill: Capillary refill takes less than 2 seconds.   Neurological:      Mental Status:Alert and oriented to person, place, and time.   Mentation is normal   Psychiatric:         Mood and Affect: Mood normal.         Behavior: Behavior normal.     Procedures    ED Course:    ED Course as of 07/21/25 2104 Mon Jul 21, 2025 1217 An EKG was ordered, reviewed, and interpreted by myself:  Rate: 83.  Rhythm: Sinus rhythm with left axis deviation  QTc: 460  ST elevation: n. STEMI: N       [LENA]      ED Course User Index  [LENA] Eliu Velez MD       Lab Results (last 24 hours)       Procedure Component Value Units Date/Time    CBC & Differential [[id number]]  (Abnormal) Collected: 07/21/25 1139    Specimen: Blood Updated: 07/21/25 1147    Narrative:      The following orders were created for panel order CBC & Differential.  Procedure                               Abnormality         Status                     ---------                               -----------         ------                     CBC Auto Differential[[id number]]        Abnormal            Final result                 Please view results for these tests on the individual orders.    Comprehensive Metabolic Panel [[id number]]  (Abnormal) Collected: 07/21/25 1139    Specimen: Blood Updated: 07/21/25 1204     Glucose 255 mg/dL      BUN 20.4 mg/dL      Creatinine 0.89 mg/dL      Sodium 142 mmol/L      Potassium 4.2 mmol/L      Chloride 104 mmol/L      CO2 25.1 mmol/L       Calcium 9.7 mg/dL      Total Protein 6.9 g/dL      Albumin 4.0 g/dL      ALT (SGPT) 55 U/L      AST (SGOT) 31 U/L      Alkaline Phosphatase 112 U/L      Total Bilirubin 1.1 mg/dL      Globulin 2.9 gm/dL      A/G Ratio 1.4 g/dL      BUN/Creatinine Ratio 22.9     Anion Gap 12.9 mmol/L      eGFR 91.1 mL/min/1.73     Narrative:      GFR Categories in Chronic Kidney Disease (CKD)              GFR Category          GFR (mL/min/1.73)    Interpretation  G1                    90 or greater        Normal or high (1)  G2                    60-89                Mild decrease (1)  G3a                   45-59                Mild to moderate decrease  G3b                   30-44                Moderate to severe decrease  G4                    15-29                Severe decrease  G5                    14 or less           Kidney failure    (1)In the absence of evidence of kidney disease, neither GFR category G1 or G2 fulfill the criteria for CKD.    eGFR calculation 2021 CKD-EPI creatinine equation, which does not include race as a factor    BNP [[id number]]  (Abnormal) Collected: 07/21/25 1139    Specimen: Blood Updated: 07/21/25 1202     proBNP 6,156.0 pg/mL     Narrative:      This assay is used as an aid in the diagnosis of individuals suspected of having heart failure. It can be used as an aid in the diagnosis of acute decompensated heart failure (ADHF) in patients presenting with signs and symptoms of ADHF to the emergency department (ED). In addition, NT-proBNP of <300 pg/mL indicates ADHF is not likely.    Age Range Result Interpretation  NT-proBNP Concentration (pg/mL:      <50             Positive            >450                   Gray                 300-450                    Negative             <300    50-75           Positive            >900                  Gray                300-900                  Negative            <300      >75             Positive            >1800                  Ferrell                 300-1800                  Negative            <300    High Sensitivity Troponin T [[id number]]  (Abnormal) Collected: 07/21/25 1139    Specimen: Blood Updated: 07/21/25 1201     HS Troponin T 41 ng/L     CBC Auto Differential [[id number]]  (Abnormal) Collected: 07/21/25 1139    Specimen: Blood Updated: 07/21/25 1147     WBC 8.13 10*3/mm3      RBC 4.98 10*6/mm3      Hemoglobin 15.8 g/dL      Hematocrit 47.3 %      MCV 95.0 fL      MCH 31.7 pg      MCHC 33.4 g/dL      RDW 13.4 %      RDW-SD 46.5 fl      MPV 10.2 fL      Platelets 198 10*3/mm3      Neutrophil % 73.3 %      Lymphocyte % 17.8 %      Monocyte % 7.9 %      Eosinophil % 0.5 %      Basophil % 0.4 %      Immature Grans % 0.1 %      Neutrophils, Absolute 5.96 10*3/mm3      Lymphocytes, Absolute 1.45 10*3/mm3      Monocytes, Absolute 0.64 10*3/mm3      Eosinophils, Absolute 0.04 10*3/mm3      Basophils, Absolute 0.03 10*3/mm3      Immature Grans, Absolute 0.01 10*3/mm3     High Sensitivity Troponin T 1Hr [[id number]]  (Abnormal) Collected: 07/21/25 1300    Specimen: Blood Updated: 07/21/25 1328     HS Troponin T 36 ng/L      Troponin T Numeric Delta -5 ng/L      Troponin T % Delta -12    Narrative:      High Sensitive Troponin T Reference Range:  <14.0 ng/L- Negative Female for AMI  <22.0 ng/L- Negative Male for AMI  >=14 - Abnormal Female indicating possible myocardial injury.  >=22 - Abnormal Male indicating possible myocardial injury.   Clinicians would have to utilize clinical acumen, EKG, Troponin, and serial changes to determine if it is an Acute Myocardial Infarction or myocardial injury due to an underlying chronic condition.         Magnesium [[id number]]  (Normal) Collected: 07/21/25 1300    Specimen: Blood Updated: 07/21/25 1358     Magnesium 2.1 mg/dL     Lipase [[id number]]  (Normal) Collected: 07/21/25 1300    Specimen: Blood Updated: 07/21/25 1812     Lipase 22 U/L     POC Glucose 4x Daily Before Meals & at Bedtime [[id number]]  (Abnormal) Collected:  07/21/25 1746    Specimen: Blood Updated: 07/21/25 1801     Glucose 162 mg/dL      Comment: Serial Number: [id number]:  [id number]       Urinalysis With Culture If Indicated - Urine, Clean Catch [[id number]]  (Abnormal) Collected: 07/1953    Specimen: Urine, Clean Catch Updated: 07/21/25 2001     Color, UA Yellow     Appearance, UA Clear     pH, UA 6.5     Specific Gravity, UA <=1.005     Glucose, UA >=1000 mg/dL (3+)     Ketones, UA Negative     Bilirubin, UA Negative     Blood, UA Negative     Protein, UA Negative     Leuk Esterase, UA Negative     Nitrite, UA Negative     Urobilinogen, UA 0.2 E.U./dL    Narrative:      In absence of clinical symptoms, the presence of pyuria, bacteria, and/or nitrites on the urinalysis result does not correlate with infection.  Urine microscopic not indicated.    POC Glucose Once [[id number]]  (Abnormal) Collected: 07/21/25 1956    Specimen: Blood Updated: 07/21/25 2008     Glucose 202 mg/dL      Comment: Serial Number: [id number]:  [id number]                US Gallbladder  Result Date: 7/21/2025  US GALLBLADDER Date of Exam: 7/21/2025 1:35 PM EDT Indication: pain, distenetion, CT rec. Comparison: CT abdomen pelvis 7/21/2025 Technique: Grayscale and color Doppler ultrasound evaluation of the right upper quadrant was performed. Findings: Pancreas: Visualized portions of the pancreas appear grossly unremarkable. Liver: Mildly increased echogenicity.. Mildly coarse hepatic echotexture..No focal hepatic lesions identified.Normal flow is present within the imaged hepatic vasculature. Biliary: Common bile duct was not visualized. No visible intrahepatic biliary ductal dilatation. Gallbladder: Cholelithiasis. No visible gallbladder wall thickening or pericholecystic fluid. Negative sonographic Salas sign. Right Kidney: Measures 11.4 cm in long axis. No hydronephrosis.No sonographically evident nephrolithiasis.No focal suspicious appearing lesions. Other: Mild perihepatic  ascites.     Impression: Impression: Cholelithiasis without sonographic evidence of acute cholecystitis. Mildly increased hepatic echogenicity and coarse hepatic echotexture, which can be seen with hepatic steatosis and/or chronic liver disease. Mild perihepatic ascites. Electronically Signed: Arsen Emanuel MD  7/21/2025 2:12 PM EDT  Workstation ID: [id number]    CT Angiogram Chest Pulmonary Embolism  Result Date: 7/21/2025  CT ANGIOGRAM CHEST PULMONARY EMBOLISM, CT ABDOMEN PELVIS W CONTRAST Date of Exam: 7/21/2025 12:28 PM EDT Indication: Pulmonary Embolism, dyspnea. Abdominal distention, decreased appetite Comparison: CTA chest 9/8/2021 Technique: Axial CT images were obtained of the chest abdomen and pelvis after the uneventful intravenous administration of 95 cc Isovue-370 utilizing pulmonary embolism protocol for the chest and routine protocol for the abdomen and pelvis.  In addition, a 3-D volume rendered image was created for interpretation.  Reconstructed coronal and sagittal images were also obtained. Automated exposure control and iterative construction methods were used. Findings: CT PULMONARY ANGIOGRAM PULMONARY VASCULATURE: Pulmonary arteries are widely patent without evidence of embolus.  Main pulmonary artery is normal in size. No evidence of right heart strain.  MEDIASTINUM: Heart is enlarged. Aortic contours are normal. No aortic dissection identified. No mass nor pericardial effusion. CORONARY ARTERIES: There is calcified atherosclerotic disease. LUNGS: There is central groundglass attenuation within the lungs suggesting pulmonary edema with mild diffuse interstitial prominence. No significant nodule nor interstitial changes. PLEURAL SPACE: There is a small right pleural effusion. LYMPH NODES: There are no pathologically enlarged lymph nodes.    CT ABDOMEN AND PELVIS LIVER:  Unremarkable parenchyma without focal lesion. BILIARY/GALLBLADDER: There is a calcified gallstone. There is mild nonspecific  para cholecystic fluid. Correlate for signs of acute cholecystitis. Ultrasound or nuclear medicine hepatobiliary study might be useful for further assessment. SPLEEN:  Unremarkable PANCREAS:  Unremarkable ADRENAL:  Unremarkable KIDNEYS:  Unremarkable parenchyma with no solid mass identified. No obstruction.  No calculus identified. GASTROINTESTINAL/MESENTERY:  No evidence of obstruction nor inflammation.  The appendix is normal. MESENTERIC VESSELS:  Patent. AORTA/IVC:  Normal caliber.  RETROPERITONEUM/LYMPH NODES:  Unremarkable  REPRODUCTIVE:  Unremarkable BLADDER:  Unremarkable  OSSEUS STRUCTURES:  Typical for age with no acute process identified. There is small volume free fluid scattered throughout the abdomen and pelvis.      Impression: Impression: 1.No evidence of pulmonary embolism. 2.Cardiomegaly with presumed mild pulmonary edema, small right effusion, and small volume free fluid in the abdomen and pelvis. Correlate for signs of CHF. 3.Cholelithiasis with mild nonspecific pericholecystic fluid. Correlate for signs of acute cholecystitis. Ultrasound or nuclear medicine hepatobiliary study might be useful for further assessment. Electronically Signed: Anthony Miranda MD  7/21/2025 12:56 PM EDT  Workstation ID: [id number]    CT Abdomen Pelvis With Contrast  Result Date: 7/21/2025  CT ANGIOGRAM CHEST PULMONARY EMBOLISM, CT ABDOMEN PELVIS W CONTRAST Date of Exam: 7/21/2025 12:28 PM EDT Indication: Pulmonary Embolism, dyspnea. Abdominal distention, decreased appetite Comparison: CTA chest 9/8/2021 Technique: Axial CT images were obtained of the chest abdomen and pelvis after the uneventful intravenous administration of 95 cc Isovue-370 utilizing pulmonary embolism protocol for the chest and routine protocol for the abdomen and pelvis.  In addition, a 3-D volume rendered image was created for interpretation.  Reconstructed coronal and sagittal images were also obtained. Automated exposure control and iterative  construction methods were used. Findings: CT PULMONARY ANGIOGRAM PULMONARY VASCULATURE: Pulmonary arteries are widely patent without evidence of embolus.  Main pulmonary artery is normal in size. No evidence of right heart strain.  MEDIASTINUM: Heart is enlarged. Aortic contours are normal. No aortic dissection identified. No mass nor pericardial effusion. CORONARY ARTERIES: There is calcified atherosclerotic disease. LUNGS: There is central groundglass attenuation within the lungs suggesting pulmonary edema with mild diffuse interstitial prominence. No significant nodule nor interstitial changes. PLEURAL SPACE: There is a small right pleural effusion. LYMPH NODES: There are no pathologically enlarged lymph nodes.    CT ABDOMEN AND PELVIS LIVER:  Unremarkable parenchyma without focal lesion. BILIARY/GALLBLADDER: There is a calcified gallstone. There is mild nonspecific para cholecystic fluid. Correlate for signs of acute cholecystitis. Ultrasound or nuclear medicine hepatobiliary study might be useful for further assessment. SPLEEN:  Unremarkable PANCREAS:  Unremarkable ADRENAL:  Unremarkable KIDNEYS:  Unremarkable parenchyma with no solid mass identified. No obstruction.  No calculus identified. GASTROINTESTINAL/MESENTERY:  No evidence of obstruction nor inflammation.  The appendix is normal. MESENTERIC VESSELS:  Patent. AORTA/IVC:  Normal caliber.  RETROPERITONEUM/LYMPH NODES:  Unremarkable  REPRODUCTIVE:  Unremarkable BLADDER:  Unremarkable  OSSEUS STRUCTURES:  Typical for age with no acute process identified. There is small volume free fluid scattered throughout the abdomen and pelvis.      Impression: Impression: 1.No evidence of pulmonary embolism. 2.Cardiomegaly with presumed mild pulmonary edema, small right effusion, and small volume free fluid in the abdomen and pelvis. Correlate for signs of CHF. 3.Cholelithiasis with mild nonspecific pericholecystic fluid. Correlate for signs of acute cholecystitis.  Ultrasound or nuclear medicine hepatobiliary study might be useful for further assessment. Electronically Signed: Anthony Miranda MD  7/21/2025 12:56 PM EDT  Workstation ID: [id number]    XR Chest 1 View  Result Date: 7/21/2025  XR CHEST 1 VW Date of Exam: 7/21/2025 11:51 AM EDT Indication: SOA triage protocol Comparison: 3/16/2025 Findings: Cardiomediastinal silhouette is enlarged, similar prior examination. No airspace disease, pneumothorax, nor pleural effusion. No acute osseous abnormality identified.     Impression: Impression: No acute process identified. Electronically Signed: Anthony Miranda MD  7/21/2025 12:00 PM EDT  Workstation ID: [id number]         MDM     Amount and/or Complexity of Data Reviewed  Decide to obtain previous medical records or to obtain history from someone other than the patient: yes        Initial impression of presenting illness: Patient has had shortness of breath intermittently for several months, has followed up with cardiology, supposed to have a stress test and echo.  Patient states the last couple weeks it has been getting worse.  Patient sees Dr. Boateng cardiologist    DDX: includes but is not limited to: ACS, PE, pleural effusion, pneumonia, hypoxic respiratory failure, CHF, COPD, asthma    Patient arrives comfortable, elevated blood pressure O2 saturation above 90% on room air throughout emergency department visit with vitals interpreted by myself.     Pertinent results: Initial troponin 41, repeat 36.  BNP 6100.  CT angiogram chest PE shows some pericholecystic fluid and recommends ultrasound to rule out cholecystitis, this was performed and did not show any evidence of acute cholecystitis.  Did show some perihepatic ascites    Diagnostic information from other sources: Chart review    Interventions / Re-evaluation: Lasix, patient states he has not been taking it at home    Medications   sodium chloride 0.9 % flush 10 mL (has no administration in time range)   cetirizine (zyrTEC)  tablet 10 mg (10 mg Oral Given 7/21/25 1630)   empagliflozin (JARDIANCE) tablet 25 mg (25 mg Oral Given 7/21/25 1630)   metoprolol succinate XL (TOPROL-XL) 24 hr tablet 50 mg ( Oral Dose Auto Held 7/29/25 0900)   pantoprazole (PROTONIX) EC tablet 40 mg (has no administration in time range)   sodium chloride 0.9 % flush 10 mL (has no administration in time range)   sodium chloride 0.9 % flush 10 mL (has no administration in time range)   sodium chloride 0.9 % infusion 40 mL (has no administration in time range)   dextrose (GLUTOSE) oral gel 15 g (has no administration in time range)   dextrose (D50W) (25 g/50 mL) IV injection 25 g (has no administration in time range)   glucagon (GLUCAGEN) injection 1 mg (has no administration in time range)   Insulin Lispro (humaLOG) injection 2-7 Units ( Subcutaneous Not Given 7/21/25 2000)   furosemide (LASIX) injection 40 mg (has no administration in time range)   iopamidol (ISOVUE-370) 76 % injection 100 mL (95 mL Intravenous Given 7/21/25 1238)   furosemide (LASIX) injection 20 mg (20 mg Intravenous Given 7/21/25 1401)   hydrALAZINE (APRESOLINE) injection 10 mg (10 mg Intravenous Given 7/21/25 1858)       Results/clinical rationale were discussed with patient and daughter in the room    Consultations/Discussion of results with other physicians: CDU provider who accepts the patient for continued valuation and treatment and stress test and echo in the morning    Data interpreted: Nursing notes reviewed, vital signs reviewed.  Labs independently interpreted by me     Counseling: Discussed the results above with the patient regarding need for admission or discharge.  Patient understands and agrees plan of care.    Patient transferred to CDU comfortable vital signs stable  -----  ED Disposition       ED Disposition   Decision to Admit    Condition   --    Comment   --             Final diagnoses:   Other congestive heart failure   Dyspnea, unspecified type     Your Follow-Up  Providers    Follow-up information has not been specified.       Contact information for after-discharge care    Follow-up information has not been specified.          Your medication list        CONTINUE taking these medications        Instructions Last Dose Given Next Dose Due   acetaminophen 325 MG tablet  Commonly known as: TYLENOL      Take 2 tablets by mouth Every 6 (Six) Hours As Needed for Mild Pain .       albuterol sulfate  (90 Base) MCG/ACT inhaler  Commonly known as: PROVENTIL HFA;VENTOLIN HFA;PROAIR HFA      Inhale 2 puffs Every 4 (Four) Hours As Needed for Wheezing.              ASK your doctor about these medications        Instructions Last Dose Given Next Dose Due   cetirizine 10 MG tablet  Commonly known as: zyrTEC      Take 1 tablet by mouth Daily.       cholecalciferol 10 MCG (400 UNIT) tablet  Commonly known as: VITAMIN D3      Take 1 tablet by mouth Daily.       CoQmax 200 MG capsule  Generic drug: Ubiquinol      Take 200 mg by mouth Daily.       furosemide 20 MG tablet  Commonly known as: LASIX  Ask about: Which instructions should I use?      Take 1 tablet by mouth Every Other Day.       Ginkoba 40 MG tablet  Generic drug: Ginkgo Biloba      Take 40 mg by mouth Daily.       glimepiride 4 MG tablet  Commonly known as: AMARYL      Take 1 tablet by mouth 2 (Two) Times a Day.       Jardiance 25 MG tablet tablet  Generic drug: empagliflozin      Take 1 tablet by mouth Daily.       magnesium oxide 400 MG tablet  Commonly known as: MAG-OX      Take 250 mg by mouth Daily.       metoprolol succinate XL 50 MG 24 hr tablet  Commonly known as: TOPROL-XL      Take 1 tablet by mouth Daily.       NON FORMULARY      Take 188 mg by mouth Daily. Reishi Mushroom extract       omeprazole 40 MG capsule  Commonly known as: priLOSEC      Take 1 capsule by mouth Daily.       PROSTATE 2.4 PO      Take 2.4 mg by mouth Daily.       Turmeric Root powder      Use 300 mg Daily.       Unable to find      Take 1 each  by mouth Daily. Med Name: Hnoj-Pnud-Fjgb-Banab-Mar-star: ([phone])       vitamin E 100 UNIT capsule      Take 1 capsule by mouth Daily.       Zinc Acetate 50 MG capsule      Take 50 mg by mouth Daily.

## 2025-07-22 NOTE — PROGRESS NOTES
[facility]     PROGRESS NOTE    Patient Name: Flip Yu II  : 1955  MRN: [id number]  Primary Care Physician:  Nico Theodore MD  Date of admission: 2025    Subjective   Subjective     Chief Complaint:     Shortness of Breath  Weakness - Generalized    Patient with uneventful overnight course in the CDU.  He was placed on supplemental oxygen when sleeping, he does not typically wear oxygen at home but suspect he has some underlying undiagnosed sleep apnea.  Will plan for referral to sleep medicine upon discharge.  Patient scheduled for nuclear stress and echo in AM.      Review of Systems   Respiratory:  Positive for shortness of breath.    All other systems reviewed and are negative.      Objective   Objective     Vitals:   Temp:  [97.8 °F (36.6 °C)-98.7 °F (37.1 °C)] 98.6 °F (37 °C)  Heart Rate:  [80-97] 82  Resp:  [18-24] 18  BP: (139-183)/() 148/76  Flow (L/min) (Oxygen Therapy):  [2] 2      Physical Exam  Vitals and nursing note reviewed.   Constitutional:       General: He is not in acute distress.  HENT:      Head: Normocephalic and atraumatic.   Cardiovascular:      Rate and Rhythm: Normal rate and regular rhythm.   Pulmonary:      Effort: Pulmonary effort is normal. No respiratory distress.      Breath sounds: Normal breath sounds.   Abdominal:      Palpations: Abdomen is soft.      Tenderness: There is no abdominal tenderness. There is no guarding or rebound.   Musculoskeletal:         General: Normal range of motion.      Right lower leg: No edema.      Left lower leg: No edema.   Skin:     General: Skin is warm and dry.   Neurological:      Mental Status: He is alert.      Comments: Awake and alert   Psychiatric:         Mood and Affect: Mood normal.         Behavior: Behavior normal.          Procedures    US Gallbladder  Result Date: 2025  US GALLBLADDER Date of Exam: 2025 1:35 PM EDT Indication: pain, distenetion, CT rec. Comparison: CT abdomen pelvis  7/21/2025 Technique: Grayscale and color Doppler ultrasound evaluation of the right upper quadrant was performed. Findings: Pancreas: Visualized portions of the pancreas appear grossly unremarkable. Liver: Mildly increased echogenicity.. Mildly coarse hepatic echotexture..No focal hepatic lesions identified.Normal flow is present within the imaged hepatic vasculature. Biliary: Common bile duct was not visualized. No visible intrahepatic biliary ductal dilatation. Gallbladder: Cholelithiasis. No visible gallbladder wall thickening or pericholecystic fluid. Negative sonographic Salas sign. Right Kidney: Measures 11.4 cm in long axis. No hydronephrosis.No sonographically evident nephrolithiasis.No focal suspicious appearing lesions. Other: Mild perihepatic ascites.     Impression: Impression: Cholelithiasis without sonographic evidence of acute cholecystitis. Mildly increased hepatic echogenicity and coarse hepatic echotexture, which can be seen with hepatic steatosis and/or chronic liver disease. Mild perihepatic ascites. Electronically Signed: Arsen Emanuel MD  7/21/2025 2:12 PM EDT  Workstation ID: [id number]    CT Angiogram Chest Pulmonary Embolism  Result Date: 7/21/2025  CT ANGIOGRAM CHEST PULMONARY EMBOLISM, CT ABDOMEN PELVIS W CONTRAST Date of Exam: 7/21/2025 12:28 PM EDT Indication: Pulmonary Embolism, dyspnea. Abdominal distention, decreased appetite Comparison: CTA chest 9/8/2021 Technique: Axial CT images were obtained of the chest abdomen and pelvis after the uneventful intravenous administration of 95 cc Isovue-370 utilizing pulmonary embolism protocol for the chest and routine protocol for the abdomen and pelvis.  In addition, a 3-D volume rendered image was created for interpretation.  Reconstructed coronal and sagittal images were also obtained. Automated exposure control and iterative construction methods were used. Findings: CT PULMONARY ANGIOGRAM PULMONARY VASCULATURE: Pulmonary arteries are  widely patent without evidence of embolus.  Main pulmonary artery is normal in size. No evidence of right heart strain.  MEDIASTINUM: Heart is enlarged. Aortic contours are normal. No aortic dissection identified. No mass nor pericardial effusion. CORONARY ARTERIES: There is calcified atherosclerotic disease. LUNGS: There is central groundglass attenuation within the lungs suggesting pulmonary edema with mild diffuse interstitial prominence. No significant nodule nor interstitial changes. PLEURAL SPACE: There is a small right pleural effusion. LYMPH NODES: There are no pathologically enlarged lymph nodes.    CT ABDOMEN AND PELVIS LIVER:  Unremarkable parenchyma without focal lesion. BILIARY/GALLBLADDER: There is a calcified gallstone. There is mild nonspecific para cholecystic fluid. Correlate for signs of acute cholecystitis. Ultrasound or nuclear medicine hepatobiliary study might be useful for further assessment. SPLEEN:  Unremarkable PANCREAS:  Unremarkable ADRENAL:  Unremarkable KIDNEYS:  Unremarkable parenchyma with no solid mass identified. No obstruction.  No calculus identified. GASTROINTESTINAL/MESENTERY:  No evidence of obstruction nor inflammation.  The appendix is normal. MESENTERIC VESSELS:  Patent. AORTA/IVC:  Normal caliber.  RETROPERITONEUM/LYMPH NODES:  Unremarkable  REPRODUCTIVE:  Unremarkable BLADDER:  Unremarkable  OSSEUS STRUCTURES:  Typical for age with no acute process identified. There is small volume free fluid scattered throughout the abdomen and pelvis.      Impression: Impression: 1.No evidence of pulmonary embolism. 2.Cardiomegaly with presumed mild pulmonary edema, small right effusion, and small volume free fluid in the abdomen and pelvis. Correlate for signs of CHF. 3.Cholelithiasis with mild nonspecific pericholecystic fluid. Correlate for signs of acute cholecystitis. Ultrasound or nuclear medicine hepatobiliary study might be useful for further assessment. Electronically Signed:  Anthony Miranda MD  7/21/2025 12:56 PM EDT  Workstation ID: [id number]    CT Abdomen Pelvis With Contrast  Result Date: 7/21/2025  CT ANGIOGRAM CHEST PULMONARY EMBOLISM, CT ABDOMEN PELVIS W CONTRAST Date of Exam: 7/21/2025 12:28 PM EDT Indication: Pulmonary Embolism, dyspnea. Abdominal distention, decreased appetite Comparison: CTA chest 9/8/2021 Technique: Axial CT images were obtained of the chest abdomen and pelvis after the uneventful intravenous administration of 95 cc Isovue-370 utilizing pulmonary embolism protocol for the chest and routine protocol for the abdomen and pelvis.  In addition, a 3-D volume rendered image was created for interpretation.  Reconstructed coronal and sagittal images were also obtained. Automated exposure control and iterative construction methods were used. Findings: CT PULMONARY ANGIOGRAM PULMONARY VASCULATURE: Pulmonary arteries are widely patent without evidence of embolus.  Main pulmonary artery is normal in size. No evidence of right heart strain.  MEDIASTINUM: Heart is enlarged. Aortic contours are normal. No aortic dissection identified. No mass nor pericardial effusion. CORONARY ARTERIES: There is calcified atherosclerotic disease. LUNGS: There is central groundglass attenuation within the lungs suggesting pulmonary edema with mild diffuse interstitial prominence. No significant nodule nor interstitial changes. PLEURAL SPACE: There is a small right pleural effusion. LYMPH NODES: There are no pathologically enlarged lymph nodes.    CT ABDOMEN AND PELVIS LIVER:  Unremarkable parenchyma without focal lesion. BILIARY/GALLBLADDER: There is a calcified gallstone. There is mild nonspecific para cholecystic fluid. Correlate for signs of acute cholecystitis. Ultrasound or nuclear medicine hepatobiliary study might be useful for further assessment. SPLEEN:  Unremarkable PANCREAS:  Unremarkable ADRENAL:  Unremarkable KIDNEYS:  Unremarkable parenchyma with no solid mass identified. No  "obstruction.  No calculus identified. GASTROINTESTINAL/MESENTERY:  No evidence of obstruction nor inflammation.  The appendix is normal. MESENTERIC VESSELS:  Patent. AORTA/IVC:  Normal caliber.  RETROPERITONEUM/LYMPH NODES:  Unremarkable  REPRODUCTIVE:  Unremarkable BLADDER:  Unremarkable  OSSEUS STRUCTURES:  Typical for age with no acute process identified. There is small volume free fluid scattered throughout the abdomen and pelvis.      Impression: Impression: 1.No evidence of pulmonary embolism. 2.Cardiomegaly with presumed mild pulmonary edema, small right effusion, and small volume free fluid in the abdomen and pelvis. Correlate for signs of CHF. 3.Cholelithiasis with mild nonspecific pericholecystic fluid. Correlate for signs of acute cholecystitis. Ultrasound or nuclear medicine hepatobiliary study might be useful for further assessment. Electronically Signed: Anthony Miranda MD  7/21/2025 12:56 PM EDT  Workstation ID: [id number]    XR Chest 1 View  Result Date: 7/21/2025  XR CHEST 1 VW Date of Exam: 7/21/2025 11:51 AM EDT Indication: SOA triage protocol Comparison: 3/16/2025 Findings: Cardiomediastinal silhouette is enlarged, similar prior examination. No airspace disease, pneumothorax, nor pleural effusion. No acute osseous abnormality identified.     Impression: Impression: No acute process identified. Electronically Signed: Anthony Miranda MD  7/21/2025 12:00 PM EDT  Workstation ID: [id number]             No results found for: \"SITE\", \"ALLENTEST\", \"PHART\", \"ZMP4RDY\", \"PO2ART\", \"XEL0TOI\", \"BASEEXCESS\", \"M9LKSIAW\", \"HGBBG\", \"HCTABG\", \"OXYHEMOGLOBI\", \"METHHGBN\", \"CARBOXYHGB\", \"CO2CT\", \"BAROMETRIC\", \"MODALITY\", \"FIO2\"    Results from last 7 days   Lab Units 07/21/25  1300 07/21/25  1139   HSTROP T ng/L 36* 41*       Results from last 7 days   Lab Units 07/21/25  1139   WBC 10*3/mm3 8.13   HEMOGLOBIN g/dL 15.8   HEMATOCRIT % 47.3   PLATELETS 10*3/mm3 198       Results from last 7 days   Lab Units 07/21/25  1139 "   SODIUM mmol/L 142   POTASSIUM mmol/L 4.2   CHLORIDE mmol/L 104   CO2 mmol/L 25.1   BUN mg/dL 20.4   CREATININE mg/dL 0.89   CALCIUM mg/dL 9.7   BILIRUBIN mg/dL 1.1   ALK PHOS U/L 112   ALT (SGPT) U/L 55*   AST (SGOT) U/L 31   GLUCOSE mg/dL 255*       Active Hospital Problems:  Active Hospital Problems    Diagnosis     **CHF (congestive heart failure)          Ansley Burton PA-C

## 2025-07-22 NOTE — DISCHARGE SUMMARY
[facility] CDU  DISCHARGE SUMMARY      Patient Name: Flip Yu II  : 1955  MRN: [id number]    Date of Admission: 2025  Date of Discharge:  25   Primary Care Physician: Nico Theodore MD      Presenting Problem:   CHF (congestive heart failure) [I50.9]    Active and Resolved Hospital Problems:  Active Hospital Problems    Diagnosis POA    **CHF (congestive heart failure) [I50.9] Yes      Resolved Hospital Problems   No resolved problems to display.     HPI: Flip Yu II is a 70 y.o. male with past medical history of uncontrolled diabetes (recent Ha1c was 11.2) and hypertension presented to the emergency department with increased shortness of breath, worse today.  Patient reports worsening dyspnea with exertion for the past 4 days.  Patient denies chest pain.  Patient states he feels like his abdomen is bloated.  Patient is a poor historian.  He states he takes Lasix but is unsure why.  He denies ever being told he has congestive heart failure.  He states he is unsure why he takes any of his medication.     Hospital Course: Patient was admitted to the CDU for further observation and for nuclear stress test and echocardiogram in the morning.  Patient had stable troponins in the emergency department.  Patient's EKG was at baseline.  Patient denies active chest pain.  Patient has received IV Lasix throughout observation stay.  Patient reports history of ILIANA but is noncompliant with his CPAP.  Patient did require 2 L nasal cannula while sleeping but has oxygen saturation 94% or above on room air.  Patient does not have increased work of breathing.  Echocardiogram reveals EF 26 to 30%.  The left ventricular cavity is dilated.  The left ventricular wall thickness is consistent with borderline concentric hypertrophy.  There is grade 2 diastolic function.  The left atrial cavity is dilated.    Stress test reveals findings consistent with a high risk study.  There is abnormal LV  wall motion consistent with moderate hypokinesis of the lateral and inferior wall.  Myocardial perfusion imaging indicates a moderate to large size infarct in the inferior wall, lateral wall, basal inferior lateral wall and apex with no significant ischemia noted.  Myocardial perfusion imaging indicates a small sized mildly severe area of ischemia located in the anterior wall, apex, lateral wall, and septal wall.    I discussed case with patient's cardiologist, Dr. Boateng who interpreted stress test and echocardiogram and cardiologist reports that these findings are likely chronic for patient.  He does not recommend further treatment or workup in the hospital at this time.  Patient is already taking a beta-blocker.  Patient takes 20 mg of Lasix daily and cardiologist recommends that he increase his dose to 40 mg if his daily weight increases by 4 pounds or more from baseline.  Patient is prescribed potassium chloride to take 20 mill equivalents if he doubles his dose of Lasix.  Cardiologist recommends patient have nitroglycerin at home.  Which is ordered.  Discussed lab results and imaging and test results with patient and he is agreeable plan to discharge home and close outpatient follow-up.  Patient is able to ambulate in the CDU without assistance.  Patient is instructed to contact cardiologist for follow-up.  Patient given strict return precautions to the emergency department.    Nurses Notes reviewed and agree, including vitals, allergies, social history and prior medical history.     REVIEW OF SYSTEMS: All systems reviewed and not pertinent unless noted.  Review of Systems   Respiratory:  Positive for shortness of breath.    Gastrointestinal:  Positive for abdominal distention.   All other systems reviewed and are negative.      Past Medical History:   Diagnosis Date    Diabetes     Hypertension        Allergies:    Lisinopril      History reviewed. No pertinent surgical history.      Social History  "    Socioeconomic History    Marital status:    Tobacco Use    Smoking status: Never    Smokeless tobacco: Never   Vaping Use    Vaping status: Never Used   Substance and Sexual Activity    Alcohol use: Never    Drug use: Never    Sexual activity: Defer         Family History   Problem Relation Age of Onset    No Known Problems Mother        Objective  Physical Exam:  /84 (BP Location: Right arm, Patient Position: Lying)   Pulse 90   Temp 97.8 °F (36.6 °C) (Oral)   Resp 14   Ht 188 cm (74.02\")   Wt 111 kg (244 lb 11.4 oz)   SpO2 97%   BMI 31.41 kg/m²      Physical Exam  Vitals and nursing note reviewed.   Constitutional:       General: He is not in acute distress.     Appearance: He is not toxic-appearing.   HENT:      Head: Normocephalic and atraumatic.      Nose: Nose normal.      Mouth/Throat:      Mouth: Mucous membranes are moist.   Eyes:      Extraocular Movements: Extraocular movements intact.      Conjunctiva/sclera: Conjunctivae normal.      Pupils: Pupils are equal, round, and reactive to light.   Cardiovascular:      Rate and Rhythm: Normal rate and regular rhythm.      Pulses: Normal pulses.      Heart sounds: Normal heart sounds.   Pulmonary:      Effort: Pulmonary effort is normal.      Breath sounds: Normal breath sounds. No stridor. No wheezing or rhonchi.   Abdominal:      General: Bowel sounds are normal. There is no distension.      Palpations: Abdomen is soft.      Tenderness: There is no abdominal tenderness. There is no guarding.   Musculoskeletal:         General: No deformity. Normal range of motion.      Cervical back: Normal range of motion and neck supple.      Right lower leg: No edema.      Left lower leg: No edema.   Skin:     General: Skin is warm and dry.      Capillary Refill: Capillary refill takes less than 2 seconds.      Findings: No erythema or rash.   Neurological:      General: No focal deficit present.      Mental Status: He is alert and oriented to " person, place, and time.      Cranial Nerves: No cranial nerve deficit.      Gait: Gait normal.   Psychiatric:         Mood and Affect: Mood normal.         Behavior: Behavior normal.         Procedures    Stress Test With Myocardial Perfusion One Day  Result Date: 7/22/2025    Impressions are consistent with a high risk study.   Left ventricular ejection fraction is moderately reduced (Calculated EF = 36%).   Abnormal LV wall motion consistent with moderate hypokinesis of the lateral and inferior wall.   Myocardial perfusion imaging indicates a moderate-to-large-sized infarct located in the inferior wall, lateral wall, basal inferior lateral wall and apex with no significant ischemia noted.   Myocardial perfusion imaging indicates a small-sized, mildly severe area of ischemia located in the anterior wall, apex, lateral wall and septal wall.   Diaphragmatic attenuation artifact is present.   Findings consistent with an equivocal ECG stress test.     XR Chest 1 View  Result Date: 7/22/2025  XR CHEST 1 VW Date of Exam: 7/22/2025 7:54 AM EDT Indication: SOA Comparison: 7/21/2025 chest radiograph and chest CT scan Findings: Heart shadow remains enlarged and the vasculature is mildly cephalized. Mild bronchial wall thickening and interstitial change is more prominent than on the prior radiograph, but similar to the findings of 7/21/2025 chest CT scan. No lung consolidation, effusion or pneumothorax is seen.     Impression: Impression: Borderline changes of congestive heart failure similar to 7/21/2025 chest CT scan. Electronically Signed: Lencho Rosado MD  7/22/2025 8:06 AM EDT  Workstation ID: [id number]    US Gallbladder  Result Date: 7/21/2025  US GALLBLADDER Date of Exam: 7/21/2025 1:35 PM EDT Indication: pain, distenetion, CT rec. Comparison: CT abdomen pelvis 7/21/2025 Technique: Grayscale and color Doppler ultrasound evaluation of the right upper quadrant was performed. Findings: Pancreas: Visualized portions of the  pancreas appear grossly unremarkable. Liver: Mildly increased echogenicity.. Mildly coarse hepatic echotexture..No focal hepatic lesions identified.Normal flow is present within the imaged hepatic vasculature. Biliary: Common bile duct was not visualized. No visible intrahepatic biliary ductal dilatation. Gallbladder: Cholelithiasis. No visible gallbladder wall thickening or pericholecystic fluid. Negative sonographic Salas sign. Right Kidney: Measures 11.4 cm in long axis. No hydronephrosis.No sonographically evident nephrolithiasis.No focal suspicious appearing lesions. Other: Mild perihepatic ascites.     Impression: Impression: Cholelithiasis without sonographic evidence of acute cholecystitis. Mildly increased hepatic echogenicity and coarse hepatic echotexture, which can be seen with hepatic steatosis and/or chronic liver disease. Mild perihepatic ascites. Electronically Signed: Arsen Emanuel MD  7/21/2025 2:12 PM EDT  Workstation ID: [id number]    CT Angiogram Chest Pulmonary Embolism  Result Date: 7/21/2025  CT ANGIOGRAM CHEST PULMONARY EMBOLISM, CT ABDOMEN PELVIS W CONTRAST Date of Exam: 7/21/2025 12:28 PM EDT Indication: Pulmonary Embolism, dyspnea. Abdominal distention, decreased appetite Comparison: CTA chest 9/8/2021 Technique: Axial CT images were obtained of the chest abdomen and pelvis after the uneventful intravenous administration of 95 cc Isovue-370 utilizing pulmonary embolism protocol for the chest and routine protocol for the abdomen and pelvis.  In addition, a 3-D volume rendered image was created for interpretation.  Reconstructed coronal and sagittal images were also obtained. Automated exposure control and iterative construction methods were used. Findings: CT PULMONARY ANGIOGRAM PULMONARY VASCULATURE: Pulmonary arteries are widely patent without evidence of embolus.  Main pulmonary artery is normal in size. No evidence of right heart strain.  MEDIASTINUM: Heart is enlarged. Aortic  contours are normal. No aortic dissection identified. No mass nor pericardial effusion. CORONARY ARTERIES: There is calcified atherosclerotic disease. LUNGS: There is central groundglass attenuation within the lungs suggesting pulmonary edema with mild diffuse interstitial prominence. No significant nodule nor interstitial changes. PLEURAL SPACE: There is a small right pleural effusion. LYMPH NODES: There are no pathologically enlarged lymph nodes.    CT ABDOMEN AND PELVIS LIVER:  Unremarkable parenchyma without focal lesion. BILIARY/GALLBLADDER: There is a calcified gallstone. There is mild nonspecific para cholecystic fluid. Correlate for signs of acute cholecystitis. Ultrasound or nuclear medicine hepatobiliary study might be useful for further assessment. SPLEEN:  Unremarkable PANCREAS:  Unremarkable ADRENAL:  Unremarkable KIDNEYS:  Unremarkable parenchyma with no solid mass identified. No obstruction.  No calculus identified. GASTROINTESTINAL/MESENTERY:  No evidence of obstruction nor inflammation.  The appendix is normal. MESENTERIC VESSELS:  Patent. AORTA/IVC:  Normal caliber.  RETROPERITONEUM/LYMPH NODES:  Unremarkable  REPRODUCTIVE:  Unremarkable BLADDER:  Unremarkable  OSSEUS STRUCTURES:  Typical for age with no acute process identified. There is small volume free fluid scattered throughout the abdomen and pelvis.      Impression: Impression: 1.No evidence of pulmonary embolism. 2.Cardiomegaly with presumed mild pulmonary edema, small right effusion, and small volume free fluid in the abdomen and pelvis. Correlate for signs of CHF. 3.Cholelithiasis with mild nonspecific pericholecystic fluid. Correlate for signs of acute cholecystitis. Ultrasound or nuclear medicine hepatobiliary study might be useful for further assessment. Electronically Signed: nAthony Miranda MD  7/21/2025 12:56 PM EDT  Workstation ID: [id number]    CT Abdomen Pelvis With Contrast  Result Date: 7/21/2025  CT ANGIOGRAM CHEST PULMONARY  EMBOLISM, CT ABDOMEN PELVIS W CONTRAST Date of Exam: 7/21/2025 12:28 PM EDT Indication: Pulmonary Embolism, dyspnea. Abdominal distention, decreased appetite Comparison: CTA chest 9/8/2021 Technique: Axial CT images were obtained of the chest abdomen and pelvis after the uneventful intravenous administration of 95 cc Isovue-370 utilizing pulmonary embolism protocol for the chest and routine protocol for the abdomen and pelvis.  In addition, a 3-D volume rendered image was created for interpretation.  Reconstructed coronal and sagittal images were also obtained. Automated exposure control and iterative construction methods were used. Findings: CT PULMONARY ANGIOGRAM PULMONARY VASCULATURE: Pulmonary arteries are widely patent without evidence of embolus.  Main pulmonary artery is normal in size. No evidence of right heart strain.  MEDIASTINUM: Heart is enlarged. Aortic contours are normal. No aortic dissection identified. No mass nor pericardial effusion. CORONARY ARTERIES: There is calcified atherosclerotic disease. LUNGS: There is central groundglass attenuation within the lungs suggesting pulmonary edema with mild diffuse interstitial prominence. No significant nodule nor interstitial changes. PLEURAL SPACE: There is a small right pleural effusion. LYMPH NODES: There are no pathologically enlarged lymph nodes.    CT ABDOMEN AND PELVIS LIVER:  Unremarkable parenchyma without focal lesion. BILIARY/GALLBLADDER: There is a calcified gallstone. There is mild nonspecific para cholecystic fluid. Correlate for signs of acute cholecystitis. Ultrasound or nuclear medicine hepatobiliary study might be useful for further assessment. SPLEEN:  Unremarkable PANCREAS:  Unremarkable ADRENAL:  Unremarkable KIDNEYS:  Unremarkable parenchyma with no solid mass identified. No obstruction.  No calculus identified. GASTROINTESTINAL/MESENTERY:  No evidence of obstruction nor inflammation.  The appendix is normal. MESENTERIC VESSELS:   "Patent. AORTA/IVC:  Normal caliber.  RETROPERITONEUM/LYMPH NODES:  Unremarkable  REPRODUCTIVE:  Unremarkable BLADDER:  Unremarkable  OSSEUS STRUCTURES:  Typical for age with no acute process identified. There is small volume free fluid scattered throughout the abdomen and pelvis.      Impression: Impression: 1.No evidence of pulmonary embolism. 2.Cardiomegaly with presumed mild pulmonary edema, small right effusion, and small volume free fluid in the abdomen and pelvis. Correlate for signs of CHF. 3.Cholelithiasis with mild nonspecific pericholecystic fluid. Correlate for signs of acute cholecystitis. Ultrasound or nuclear medicine hepatobiliary study might be useful for further assessment. Electronically Signed: Anthony Miranda MD  7/21/2025 12:56 PM EDT  Workstation ID: [id number]    XR Chest 1 View  Result Date: 7/21/2025  XR CHEST 1 VW Date of Exam: 7/21/2025 11:51 AM EDT Indication: SOA triage protocol Comparison: 3/16/2025 Findings: Cardiomediastinal silhouette is enlarged, similar prior examination. No airspace disease, pneumothorax, nor pleural effusion. No acute osseous abnormality identified.     Impression: Impression: No acute process identified. Electronically Signed: Anthony Miranda MD  7/21/2025 12:00 PM EDT  Workstation ID: [id number]             No results found for: \"SITE\", \"ALLENTEST\", \"PHART\", \"YUG1YIA\", \"PO2ART\", \"YNZ6BST\", \"BASEEXCESS\", \"W7HEWSKP\", \"HGBBG\", \"HCTABG\", \"OXYHEMOGLOBI\", \"METHHGBN\", \"CARBOXYHGB\", \"CO2CT\", \"BAROMETRIC\", \"MODALITY\", \"FIO2\"    Results from last 7 days   Lab Units 07/21/25  1300 07/21/25  1139   HSTROP T ng/L 36* 41*       Results from last 7 days   Lab Units 07/22/25  0534 07/21/25  1139   WBC 10*3/mm3 8.08 8.13   HEMOGLOBIN g/dL 15.0 15.8   HEMATOCRIT % 46.5 47.3   PLATELETS 10*3/mm3 192 198       Results from last 7 days   Lab Units 07/22/25  0534 07/21/25  1139   SODIUM mmol/L 142 142   POTASSIUM mmol/L 3.8 4.2   CHLORIDE mmol/L 104 104   CO2 mmol/L 26.1 25.1   BUN mg/dL " "16.1 20.4   CREATININE mg/dL 0.74* 0.89   CALCIUM mg/dL 9.6 9.7   BILIRUBIN mg/dL 1.0 1.1   ALK PHOS U/L 100 112   ALT (SGPT) U/L 44* 55*   AST (SGOT) U/L 26 31   GLUCOSE mg/dL 151* 255*       No results found for: \"CHOL\", \"CHLPL\", \"TRIG\", \"HDL\", \"LDL\", \"LDLDIRECT\"            No results found for: \"URINECX\"    @[email or web address];urinedrugscreen@    ED Disposition       ED Disposition   Decision to Admit    Condition   --    Comment   --                    Discharge Medication List:      Your medication list        START taking these medications        Instructions Last Dose Given Next Dose Due   nitroglycerin 0.4 MG SL tablet  Commonly known as: Nitrostat      Place 1 tablet under the tongue Every 5 (Five) Minutes As Needed for Chest Pain. Take no more than 3 doses in 15 minutes.       potassium chloride 10 MEQ CR capsule  Commonly known as: MICRO-K      Take 2 capsules by mouth Daily As Needed (Take when you require increased dose of Lasix to 40 mg for weight gain of over 4 pounds.).              CONTINUE taking these medications        Instructions Last Dose Given Next Dose Due   acetaminophen 325 MG tablet  Commonly known as: TYLENOL      Take 2 tablets by mouth Every 6 (Six) Hours As Needed for Mild Pain .       albuterol sulfate  (90 Base) MCG/ACT inhaler  Commonly known as: PROVENTIL HFA;VENTOLIN HFA;PROAIR HFA      Inhale 2 puffs Every 4 (Four) Hours As Needed for Wheezing.              ASK your doctor about these medications        Instructions Last Dose Given Next Dose Due   cetirizine 10 MG tablet  Commonly known as: zyrTEC      Take 1 tablet by mouth Daily.       cholecalciferol 10 MCG (400 UNIT) tablet  Commonly known as: VITAMIN D3      Take 1 tablet by mouth Daily.       CoQmax 200 MG capsule  Generic drug: Ubiquinol      Take 200 mg by mouth Daily.       furosemide 20 MG tablet  Commonly known as: LASIX  Ask about: Which instructions should I use?      Take 1 tablet by mouth Every Other Day.     "   Ginkoba 40 MG tablet  Generic drug: Ginkgo Biloba      Take 40 mg by mouth Daily.       glimepiride 4 MG tablet  Commonly known as: AMARYL      Take 1 tablet by mouth 2 (Two) Times a Day.       Jardiance 25 MG tablet tablet  Generic drug: empagliflozin      Take 1 tablet by mouth Daily.       magnesium oxide 400 MG tablet  Commonly known as: MAG-OX      Take 250 mg by mouth Daily.       metoprolol succinate XL 50 MG 24 hr tablet  Commonly known as: TOPROL-XL      Take 1 tablet by mouth Daily.       NON FORMULARY      Take 188 mg by mouth Daily. Reishi Mushroom extract       omeprazole 40 MG capsule  Commonly known as: priLOSEC      Take 1 capsule by mouth Daily.       PROSTATE 2.4 PO      Take 2.4 mg by mouth Daily.       Turmeric Root powder      Use 300 mg Daily.       Unable to find      Take 1 each by mouth Daily. Med Name: Uenf-Hhru-Jnaq-Banab-Mar-star: ([phone])       vitamin E 100 UNIT capsule      Take 1 capsule by mouth Daily.       Zinc Acetate 50 MG capsule      Take 50 mg by mouth Daily.                 Where to Get Your Medications        These medications were sent to [facility] [address], [address], [place] [address]      Hours: Monday to Friday 8 AM to 5 PM Phone: [phone]   nitroglycerin 0.4 MG SL tablet  potassium chloride 10 MEQ CR capsule          PEDRO Hall   07/22/25   12:47 EDT       Time Spent on Discharge:  I spent  60  minutes on this discharge activity which included: face-to-face encounter with the patient, reviewing the data in the system, coordination of the care with the nursing staff as well as consultants, documentation, and entering orders.

## 2025-07-23 ENCOUNTER — READMISSION MANAGEMENT (OUTPATIENT)
Dept: CALL CENTER | Facility: HOSPITAL | Age: 70
End: 2025-07-23
Payer: MEDICARE

## 2025-08-05 PROBLEM — I50.20 HFREF (HEART FAILURE WITH REDUCED EJECTION FRACTION): Status: ACTIVE | Noted: 2025-08-05

## 2025-08-05 PROBLEM — R94.30 ABNORMAL CARDIOVASCULAR FUNCTION STUDY: Status: ACTIVE | Noted: 2025-08-05

## 2025-08-06 ENCOUNTER — OFFICE VISIT (OUTPATIENT)
Age: 70
End: 2025-08-06
Payer: MEDICARE

## 2025-08-06 ENCOUNTER — READMISSION MANAGEMENT (OUTPATIENT)
Dept: CALL CENTER | Facility: HOSPITAL | Age: 70
End: 2025-08-06
Payer: MEDICARE

## 2025-08-06 VITALS
HEART RATE: 91 BPM | DIASTOLIC BLOOD PRESSURE: 93 MMHG | BODY MASS INDEX: 30.29 KG/M2 | SYSTOLIC BLOOD PRESSURE: 150 MMHG | HEIGHT: 74 IN | WEIGHT: 236 LBS

## 2025-08-06 DIAGNOSIS — R06.09 DYSPNEA ON EXERTION: ICD-10-CM

## 2025-08-06 DIAGNOSIS — E78.00 PURE HYPERCHOLESTEROLEMIA: ICD-10-CM

## 2025-08-06 DIAGNOSIS — R94.30 ABNORMAL CARDIOVASCULAR FUNCTION STUDY: Primary | ICD-10-CM

## 2025-08-06 DIAGNOSIS — I50.20 HFREF (HEART FAILURE WITH REDUCED EJECTION FRACTION): ICD-10-CM

## 2025-08-06 RX ORDER — DAPAGLIFLOZIN 10 MG/1
10 TABLET, FILM COATED ORAL DAILY
Qty: 90 TABLET | Refills: 1 | Status: SHIPPED | OUTPATIENT
Start: 2025-08-06

## 2025-08-06 RX ORDER — ATORVASTATIN CALCIUM 20 MG/1
20 TABLET, FILM COATED ORAL DAILY
Qty: 90 TABLET | Refills: 1 | Status: SHIPPED | OUTPATIENT
Start: 2025-08-06

## 2025-08-06 RX ORDER — ASPIRIN 81 MG/1
81 TABLET, CHEWABLE ORAL ONCE
Status: SHIPPED | OUTPATIENT
Start: 2025-08-06

## 2025-08-11 LAB
QT INTERVAL: 392 MS
QTC INTERVAL: 460 MS

## 2025-08-13 ENCOUNTER — READMISSION MANAGEMENT (OUTPATIENT)
Dept: CALL CENTER | Facility: HOSPITAL | Age: 70
End: 2025-08-13
Payer: MEDICARE

## 2025-08-20 ENCOUNTER — HOSPITAL ENCOUNTER (INPATIENT)
Facility: HOSPITAL | Age: 70
LOS: 9 days | Discharge: ANOTHER HOSPITAL | End: 2025-08-29
Attending: INTERNAL MEDICINE | Admitting: INTERNAL MEDICINE
Payer: MEDICARE

## 2025-08-20 ENCOUNTER — READMISSION MANAGEMENT (OUTPATIENT)
Dept: CALL CENTER | Facility: HOSPITAL | Age: 70
End: 2025-08-20
Payer: MEDICARE

## 2025-08-20 DIAGNOSIS — R94.30 ABNORMAL CARDIOVASCULAR FUNCTION STUDY: ICD-10-CM

## 2025-08-20 DIAGNOSIS — R06.09 DYSPNEA ON EXERTION: ICD-10-CM

## 2025-08-20 DIAGNOSIS — I25.10 CORONARY ARTERY DISEASE INVOLVING NATIVE CORONARY ARTERY OF NATIVE HEART WITHOUT ANGINA PECTORIS: ICD-10-CM

## 2025-08-20 DIAGNOSIS — I50.22 CHRONIC SYSTOLIC CONGESTIVE HEART FAILURE: ICD-10-CM

## 2025-08-20 DIAGNOSIS — I50.20 HFREF (HEART FAILURE WITH REDUCED EJECTION FRACTION): ICD-10-CM

## 2025-08-20 DIAGNOSIS — I25.112 CORONARY ARTERY DISEASE INVOLVING NATIVE CORONARY ARTERY OF NATIVE HEART WITH REFRACTORY ANGINA PECTORIS: Primary | ICD-10-CM

## 2025-08-20 DIAGNOSIS — E66.2: ICD-10-CM

## 2025-08-20 DIAGNOSIS — E78.00 PURE HYPERCHOLESTEROLEMIA: ICD-10-CM

## 2025-08-20 DIAGNOSIS — I10 HYPERTENSION, ESSENTIAL: ICD-10-CM

## 2025-08-20 LAB
ANION GAP SERPL CALCULATED.3IONS-SCNC: 13 MMOL/L (ref 5–15)
BASOPHILS # BLD AUTO: 0.04 10*3/MM3 (ref 0–0.2)
BASOPHILS NFR BLD AUTO: 0.5 % (ref 0–1.5)
BUN SERPL-MCNC: 17.7 MG/DL (ref 8–23)
BUN/CREAT SERPL: 19.5 (ref 7–25)
CALCIUM SPEC-SCNC: 9.8 MG/DL (ref 8.6–10.5)
CHLORIDE SERPL-SCNC: 103 MMOL/L (ref 98–107)
CHOLEST SERPL-MCNC: 115 MG/DL (ref 0–200)
CO2 SERPL-SCNC: 24 MMOL/L (ref 22–29)
CREAT BLDA-MCNC: 0.9 MG/DL (ref 0.6–1.3)
CREAT SERPL-MCNC: 0.91 MG/DL (ref 0.76–1.27)
DEPRECATED RDW RBC AUTO: 45.5 FL (ref 37–54)
EGFRCR SERPLBLD CKD-EPI 2021: 89.5 ML/MIN/1.73
EOSINOPHIL # BLD AUTO: 0.1 10*3/MM3 (ref 0–0.4)
EOSINOPHIL NFR BLD AUTO: 1.2 % (ref 0.3–6.2)
ERYTHROCYTE [DISTWIDTH] IN BLOOD BY AUTOMATED COUNT: 13 % (ref 12.3–15.4)
GLUCOSE BLDC GLUCOMTR-MCNC: 179 MG/DL (ref 70–130)
GLUCOSE BLDC GLUCOMTR-MCNC: 233 MG/DL (ref 70–130)
GLUCOSE SERPL-MCNC: 177 MG/DL (ref 65–99)
HBA1C MFR BLD: 10.1 % (ref 4.8–5.6)
HCT VFR BLD AUTO: 48.2 % (ref 37.5–51)
HDLC SERPL-MCNC: 33 MG/DL (ref 40–60)
HGB BLD-MCNC: 16.2 G/DL (ref 13–17.7)
IMM GRANULOCYTES # BLD AUTO: 0.02 10*3/MM3 (ref 0–0.05)
IMM GRANULOCYTES NFR BLD AUTO: 0.2 % (ref 0–0.5)
LDLC SERPL CALC-MCNC: 62 MG/DL (ref 0–100)
LDLC/HDLC SERPL: 1.82 {RATIO}
LYMPHOCYTES # BLD AUTO: 1.62 10*3/MM3 (ref 0.7–3.1)
LYMPHOCYTES NFR BLD AUTO: 19.6 % (ref 19.6–45.3)
MAGNESIUM SERPL-MCNC: 2.4 MG/DL (ref 1.6–2.4)
MCH RBC QN AUTO: 32.1 PG (ref 26.6–33)
MCHC RBC AUTO-ENTMCNC: 33.6 G/DL (ref 31.5–35.7)
MCV RBC AUTO: 95.4 FL (ref 79–97)
MONOCYTES # BLD AUTO: 0.76 10*3/MM3 (ref 0.1–0.9)
MONOCYTES NFR BLD AUTO: 9.2 % (ref 5–12)
NEUTROPHILS NFR BLD AUTO: 5.74 10*3/MM3 (ref 1.7–7)
NEUTROPHILS NFR BLD AUTO: 69.3 % (ref 42.7–76)
NRBC BLD AUTO-RTO: 0 /100 WBC (ref 0–0.2)
PLATELET # BLD AUTO: 177 10*3/MM3 (ref 140–450)
PMV BLD AUTO: 10.6 FL (ref 6–12)
POTASSIUM SERPL-SCNC: 4.2 MMOL/L (ref 3.5–5.2)
RBC # BLD AUTO: 5.05 10*6/MM3 (ref 4.14–5.8)
SODIUM SERPL-SCNC: 140 MMOL/L (ref 136–145)
TRIGL SERPL-MCNC: 110 MG/DL (ref 0–150)
VLDLC SERPL-MCNC: 20 MG/DL (ref 5–40)
WBC NRBC COR # BLD AUTO: 8.28 10*3/MM3 (ref 3.4–10.8)

## 2025-08-20 PROCEDURE — 99223 1ST HOSP IP/OBS HIGH 75: CPT | Performed by: FAMILY MEDICINE

## 2025-08-20 PROCEDURE — 25010000002 FENTANYL CITRATE (PF) 50 MCG/ML SOLUTION: Performed by: INTERNAL MEDICINE

## 2025-08-20 PROCEDURE — 25010000002 HEPARIN (PORCINE) PER 1000 UNITS: Performed by: INTERNAL MEDICINE

## 2025-08-20 PROCEDURE — C1769 GUIDE WIRE: HCPCS | Performed by: INTERNAL MEDICINE

## 2025-08-20 PROCEDURE — C1894 INTRO/SHEATH, NON-LASER: HCPCS | Performed by: INTERNAL MEDICINE

## 2025-08-20 PROCEDURE — 83036 HEMOGLOBIN GLYCOSYLATED A1C: CPT | Performed by: FAMILY MEDICINE

## 2025-08-20 PROCEDURE — 93458 L HRT ARTERY/VENTRICLE ANGIO: CPT | Performed by: INTERNAL MEDICINE

## 2025-08-20 PROCEDURE — 25010000002 NICARDIPINE 2.5 MG/ML SOLUTION: Performed by: INTERNAL MEDICINE

## 2025-08-20 PROCEDURE — 82948 REAGENT STRIP/BLOOD GLUCOSE: CPT

## 2025-08-20 PROCEDURE — 25510000002 IODIXANOL PER 1 ML: Performed by: INTERNAL MEDICINE

## 2025-08-20 PROCEDURE — 83695 ASSAY OF LIPOPROTEIN(A): CPT | Performed by: INTERNAL MEDICINE

## 2025-08-20 PROCEDURE — 85025 COMPLETE CBC W/AUTO DIFF WBC: CPT | Performed by: INTERNAL MEDICINE

## 2025-08-20 PROCEDURE — 80048 BASIC METABOLIC PNL TOTAL CA: CPT | Performed by: INTERNAL MEDICINE

## 2025-08-20 PROCEDURE — 80061 LIPID PANEL: CPT | Performed by: INTERNAL MEDICINE

## 2025-08-20 PROCEDURE — 25010000002 MIDAZOLAM PER 1 MG: Performed by: INTERNAL MEDICINE

## 2025-08-20 PROCEDURE — 25810000003 SODIUM CHLORIDE 0.9 % SOLUTION: Performed by: INTERNAL MEDICINE

## 2025-08-20 PROCEDURE — 25010000002 HEPARIN (PORCINE) PER 1000 UNITS: Performed by: FAMILY MEDICINE

## 2025-08-20 PROCEDURE — 25010000002 LIDOCAINE PF 1% 1 % SOLUTION: Performed by: INTERNAL MEDICINE

## 2025-08-20 PROCEDURE — 82565 ASSAY OF CREATININE: CPT

## 2025-08-20 PROCEDURE — 83735 ASSAY OF MAGNESIUM: CPT | Performed by: FAMILY MEDICINE

## 2025-08-20 RX ORDER — BISACODYL 10 MG
10 SUPPOSITORY, RECTAL RECTAL DAILY PRN
Status: DISCONTINUED | OUTPATIENT
Start: 2025-08-20 | End: 2025-08-29 | Stop reason: HOSPADM

## 2025-08-20 RX ORDER — ASPIRIN 81 MG/1
81 TABLET ORAL DAILY
Status: DISCONTINUED | OUTPATIENT
Start: 2025-08-21 | End: 2025-08-29 | Stop reason: HOSPADM

## 2025-08-20 RX ORDER — ACETAMINOPHEN 160 MG/5ML
500 SOLUTION ORAL EVERY 6 HOURS PRN
Status: DISCONTINUED | OUTPATIENT
Start: 2025-08-20 | End: 2025-08-29 | Stop reason: HOSPADM

## 2025-08-20 RX ORDER — ACETAMINOPHEN 650 MG/1
325 SUPPOSITORY RECTAL EVERY 6 HOURS PRN
Status: DISCONTINUED | OUTPATIENT
Start: 2025-08-20 | End: 2025-08-29 | Stop reason: HOSPADM

## 2025-08-20 RX ORDER — SODIUM CHLORIDE 9 MG/ML
40 INJECTION, SOLUTION INTRAVENOUS AS NEEDED
Status: DISCONTINUED | OUTPATIENT
Start: 2025-08-20 | End: 2025-08-29 | Stop reason: HOSPADM

## 2025-08-20 RX ORDER — ATORVASTATIN CALCIUM 40 MG/1
40 TABLET, FILM COATED ORAL NIGHTLY
Status: DISCONTINUED | OUTPATIENT
Start: 2025-08-20 | End: 2025-08-29 | Stop reason: HOSPADM

## 2025-08-20 RX ORDER — ACETAMINOPHEN 500 MG
500 TABLET ORAL EVERY 6 HOURS PRN
Status: DISCONTINUED | OUTPATIENT
Start: 2025-08-20 | End: 2025-08-20 | Stop reason: SDUPTHER

## 2025-08-20 RX ORDER — ASPIRIN 81 MG/1
81 TABLET ORAL DAILY
COMMUNITY

## 2025-08-20 RX ORDER — IODIXANOL 320 MG/ML
INJECTION, SOLUTION INTRAVASCULAR
Status: DISCONTINUED | OUTPATIENT
Start: 2025-08-20 | End: 2025-08-20 | Stop reason: HOSPADM

## 2025-08-20 RX ORDER — AMOXICILLIN 250 MG
2 CAPSULE ORAL 2 TIMES DAILY PRN
Status: DISCONTINUED | OUTPATIENT
Start: 2025-08-20 | End: 2025-08-29 | Stop reason: HOSPADM

## 2025-08-20 RX ORDER — BISACODYL 5 MG/1
5 TABLET, DELAYED RELEASE ORAL DAILY PRN
Status: DISCONTINUED | OUTPATIENT
Start: 2025-08-20 | End: 2025-08-29 | Stop reason: HOSPADM

## 2025-08-20 RX ORDER — SODIUM CHLORIDE 0.9 % (FLUSH) 0.9 %
10 SYRINGE (ML) INJECTION AS NEEDED
Status: DISCONTINUED | OUTPATIENT
Start: 2025-08-20 | End: 2025-08-29 | Stop reason: HOSPADM

## 2025-08-20 RX ORDER — ATORVASTATIN CALCIUM 40 MG/1
40 TABLET, FILM COATED ORAL DAILY
Status: DISCONTINUED | OUTPATIENT
Start: 2025-08-21 | End: 2025-08-20

## 2025-08-20 RX ORDER — MIDAZOLAM HYDROCHLORIDE 1 MG/ML
INJECTION, SOLUTION INTRAMUSCULAR; INTRAVENOUS
Status: DISCONTINUED | OUTPATIENT
Start: 2025-08-20 | End: 2025-08-20 | Stop reason: HOSPADM

## 2025-08-20 RX ORDER — POLYETHYLENE GLYCOL 3350 17 G/17G
17 POWDER, FOR SOLUTION ORAL DAILY PRN
Status: DISCONTINUED | OUTPATIENT
Start: 2025-08-20 | End: 2025-08-29 | Stop reason: HOSPADM

## 2025-08-20 RX ORDER — ATORVASTATIN CALCIUM 20 MG/1
20 TABLET, FILM COATED ORAL DAILY
Status: DISCONTINUED | OUTPATIENT
Start: 2025-08-21 | End: 2025-08-20

## 2025-08-20 RX ORDER — SODIUM CHLORIDE 0.9 % (FLUSH) 0.9 %
10 SYRINGE (ML) INJECTION EVERY 12 HOURS SCHEDULED
Status: DISCONTINUED | OUTPATIENT
Start: 2025-08-20 | End: 2025-08-29 | Stop reason: HOSPADM

## 2025-08-20 RX ORDER — FUROSEMIDE 40 MG/1
40 TABLET ORAL DAILY
Status: DISCONTINUED | OUTPATIENT
Start: 2025-08-21 | End: 2025-08-29 | Stop reason: HOSPADM

## 2025-08-20 RX ORDER — METOPROLOL SUCCINATE 50 MG/1
50 TABLET, EXTENDED RELEASE ORAL DAILY
Status: DISCONTINUED | OUTPATIENT
Start: 2025-08-21 | End: 2025-08-23

## 2025-08-20 RX ORDER — ACETAMINOPHEN 325 MG/1
650 TABLET ORAL EVERY 4 HOURS PRN
Status: DISCONTINUED | OUTPATIENT
Start: 2025-08-20 | End: 2025-08-29 | Stop reason: HOSPADM

## 2025-08-20 RX ORDER — SEMAGLUTIDE 1.34 MG/ML
0.25 INJECTION, SOLUTION SUBCUTANEOUS WEEKLY
COMMUNITY
End: 2025-08-29 | Stop reason: HOSPADM

## 2025-08-20 RX ORDER — LIDOCAINE HYDROCHLORIDE 10 MG/ML
INJECTION, SOLUTION EPIDURAL; INFILTRATION; INTRACAUDAL; PERINEURAL
Status: DISCONTINUED | OUTPATIENT
Start: 2025-08-20 | End: 2025-08-20 | Stop reason: HOSPADM

## 2025-08-20 RX ORDER — FENTANYL CITRATE 50 UG/ML
INJECTION, SOLUTION INTRAMUSCULAR; INTRAVENOUS
Status: DISCONTINUED | OUTPATIENT
Start: 2025-08-20 | End: 2025-08-20 | Stop reason: HOSPADM

## 2025-08-20 RX ORDER — NITROGLYCERIN 0.4 MG/1
0.4 TABLET SUBLINGUAL
Status: DISCONTINUED | OUTPATIENT
Start: 2025-08-20 | End: 2025-08-29 | Stop reason: HOSPADM

## 2025-08-20 RX ORDER — HEPARIN SODIUM 5000 [USP'U]/ML
5000 INJECTION, SOLUTION INTRAVENOUS; SUBCUTANEOUS EVERY 8 HOURS SCHEDULED
Status: DISCONTINUED | OUTPATIENT
Start: 2025-08-20 | End: 2025-08-22

## 2025-08-20 RX ORDER — HEPARIN SODIUM 1000 [USP'U]/ML
INJECTION, SOLUTION INTRAVENOUS; SUBCUTANEOUS
Status: DISCONTINUED | OUTPATIENT
Start: 2025-08-20 | End: 2025-08-20 | Stop reason: HOSPADM

## 2025-08-20 RX ADMIN — Medication 10 ML: at 22:51

## 2025-08-20 RX ADMIN — HEPARIN SODIUM 5000 UNITS: 5000 INJECTION INTRAVENOUS; SUBCUTANEOUS at 22:50

## 2025-08-21 ENCOUNTER — APPOINTMENT (OUTPATIENT)
Dept: PULMONOLOGY | Facility: HOSPITAL | Age: 70
End: 2025-08-21
Payer: MEDICARE

## 2025-08-21 ENCOUNTER — APPOINTMENT (OUTPATIENT)
Dept: CARDIOLOGY | Facility: HOSPITAL | Age: 70
End: 2025-08-21
Payer: MEDICARE

## 2025-08-21 LAB
ANION GAP SERPL CALCULATED.3IONS-SCNC: 10 MMOL/L (ref 5–15)
BH CV UPPER DUPLEX LEFT AXILLARY ART PSV: 86.8 CM/S
BH CV UPPER DUPLEX LEFT BRACHIAL ART PSV: 56.8 CM/S
BH CV UPPER DUPLEX LEFT RADIAL ART PSV: 41.8 CM/S
BH CV UPPER DUPLEX LEFT SUBCLAVIAN  ART PSV: 84.5 CM/S
BH CV UPPER DUPLEX LEFT ULNAR ARTERY PSV: 53.2 CM/S
BH CV UPPER DUPLEX RIGHT AXILLARY ARTERY PSV: 75.2 CM/S
BH CV UPPER DUPLEX RIGHT BRACHIAL ART PSV: 63.7 CM/S
BH CV UPPER DUPLEX RIGHT RADIAL ART PSV: 59.8 CM/S
BH CV UPPER DUPLEX RIGHT SUBCLAVIAN ART PSV: 60.9 CM/S
BH CV UPPER DUPLEX RIGHT ULNAR ART PSV: 38.8 CM/S
BH CV VAS UPPER ART LT RADIAL ART DIAMETER FOREARM DIST: 0.3 CM
BH CV VAS UPPER ART LT RADIAL ART DIAMETER FOREARM MID: 0.3 CM
BH CV VAS UPPER ART LT RADIAL ART DIAMETER FOREARM PROX: 0.3 CM
BH CV VAS UPPER ART RT RADIAL ART DIAMETER FOREARM DIST: 0.4 CM
BH CV VAS UPPER ART RT RADIAL ART DIAMETER FOREARM MID: 0.4 CM
BH CV VAS UPPER ART RT RADIAL ART DIAMETER FOREARM PROX: 0.3 CM
BH CV XLRA MEAS - DIST GSV CALF DIST LEFT: 0.1 CM
BH CV XLRA MEAS - DIST GSV CALF DIST RIGHT: 0.1 CM
BH CV XLRA MEAS - DIST GSV THIGH DIST LEFT: 0.1 CM
BH CV XLRA MEAS - DIST GSV THIGH DIST RIGHT: 0.3 CM
BH CV XLRA MEAS - DIST LSV CALF DIST LEFT: 0.1 CM
BH CV XLRA MEAS - DIST LSV CALF DIST RIGHT: 0.1 CM
BH CV XLRA MEAS - GSV KNEE DIST LEFT: 0.1 CM
BH CV XLRA MEAS - GSV KNEE DIST RIGHT: 0.2 CM
BH CV XLRA MEAS - GSV ORIGIN DIST LEFT: 0.2 CM
BH CV XLRA MEAS - GSV ORIGIN DIST RIGHT: 0.4 CM
BH CV XLRA MEAS - MID GSV CALF LEFT: 0.2 CM
BH CV XLRA MEAS - MID GSV CALF RIGHT: 0.1 CM
BH CV XLRA MEAS - MID GSV THIGH  LEFT: 0.1 CM
BH CV XLRA MEAS - MID GSV THIGH  RIGHT: 0.2 CM
BH CV XLRA MEAS - MID LSV CALF DIST RIGHT: 0.1 CM
BH CV XLRA MEAS - PROX GSV CALF DIST LEFT: 0.1 CM
BH CV XLRA MEAS - PROX GSV CALF DIST RIGHT: 0.2 CM
BH CV XLRA MEAS - PROX GSV THIGH  LEFT: 0.1 CM
BH CV XLRA MEAS - PROX GSV THIGH  RIGHT: 0.3 CM
BH CV XLRA MEAS - PROX LSV CALF DIST LEFT: 0.1 CM
BH CV XLRA MEAS - PROX LSV CALF DIST RIGHT: 0.2 CM
BH CV XLRA MEAS LEFT DIST CCA EDV: 10.8 CM/SEC
BH CV XLRA MEAS LEFT DIST CCA PSV: 65.3 CM/SEC
BH CV XLRA MEAS LEFT DIST ICA EDV: 22.1 CM/SEC
BH CV XLRA MEAS LEFT DIST ICA PSV: 63.2 CM/SEC
BH CV XLRA MEAS LEFT ICA/CCA RATIO: 0.98
BH CV XLRA MEAS LEFT MID CCA EDV: 13.4 CM/SEC
BH CV XLRA MEAS LEFT MID CCA PSV: 64.5 CM/SEC
BH CV XLRA MEAS LEFT MID ICA EDV: 13.9 CM/SEC
BH CV XLRA MEAS LEFT MID ICA PSV: 45.9 CM/SEC
BH CV XLRA MEAS LEFT PROX CCA EDV: 12.1 CM/SEC
BH CV XLRA MEAS LEFT PROX CCA PSV: 87.9 CM/SEC
BH CV XLRA MEAS LEFT PROX ECA EDV: 61.9 CM/SEC
BH CV XLRA MEAS LEFT PROX ECA PSV: 61.9 CM/SEC
BH CV XLRA MEAS LEFT PROX ICA EDV: 11.7 CM/SEC
BH CV XLRA MEAS LEFT PROX ICA PSV: 38.5 CM/SEC
BH CV XLRA MEAS LEFT PROX SCLA PSV: 79.6 CM/SEC
BH CV XLRA MEAS LEFT VERTEBRAL A EDV: 8.2 CM/SEC
BH CV XLRA MEAS LEFT VERTEBRAL A PSV: 38.5 CM/SEC
BH CV XLRA MEAS RIGHT DIST CCA EDV: 12.7 CM/SEC
BH CV XLRA MEAS RIGHT DIST CCA PSV: 54.9 CM/SEC
BH CV XLRA MEAS RIGHT DIST ICA EDV: 15.6 CM/SEC
BH CV XLRA MEAS RIGHT DIST ICA PSV: 61 CM/SEC
BH CV XLRA MEAS RIGHT ICA/CCA RATIO: 0.96
BH CV XLRA MEAS RIGHT MID CCA EDV: 14.3 CM/SEC
BH CV XLRA MEAS RIGHT MID CCA PSV: 53.3 CM/SEC
BH CV XLRA MEAS RIGHT MID ICA EDV: 19 CM/SEC
BH CV XLRA MEAS RIGHT MID ICA PSV: 51.5 CM/SEC
BH CV XLRA MEAS RIGHT PROX CCA EDV: 14.3 CM/SEC
BH CV XLRA MEAS RIGHT PROX CCA PSV: 79.3 CM/SEC
BH CV XLRA MEAS RIGHT PROX ECA EDV: 8.2 CM/SEC
BH CV XLRA MEAS RIGHT PROX ECA PSV: 53.7 CM/SEC
BH CV XLRA MEAS RIGHT PROX ICA EDV: 11.7 CM/SEC
BH CV XLRA MEAS RIGHT PROX ICA PSV: 43.3 CM/SEC
BH CV XLRA MEAS RIGHT PROX SCLA PSV: 58.7 CM/SEC
BH CV XLRA MEAS RIGHT VERTEBRAL A EDV: 18.2 CM/SEC
BH CV XLRA MEAS RIGHT VERTEBRAL A PSV: 42.4 CM/SEC
BUN SERPL-MCNC: 16.9 MG/DL (ref 8–23)
BUN/CREAT SERPL: 20.1 (ref 7–25)
CALCIUM SPEC-SCNC: 9.8 MG/DL (ref 8.6–10.5)
CHLORIDE SERPL-SCNC: 102 MMOL/L (ref 98–107)
CO2 SERPL-SCNC: 30 MMOL/L (ref 22–29)
CREAT SERPL-MCNC: 0.84 MG/DL (ref 0.76–1.27)
DEPRECATED RDW RBC AUTO: 46.5 FL (ref 37–54)
EGFRCR SERPLBLD CKD-EPI 2021: 92.7 ML/MIN/1.73
ERYTHROCYTE [DISTWIDTH] IN BLOOD BY AUTOMATED COUNT: 13.2 % (ref 12.3–15.4)
GLUCOSE BLDC GLUCOMTR-MCNC: 190 MG/DL (ref 70–130)
GLUCOSE BLDC GLUCOMTR-MCNC: 193 MG/DL (ref 70–130)
GLUCOSE BLDC GLUCOMTR-MCNC: 203 MG/DL (ref 70–130)
GLUCOSE BLDC GLUCOMTR-MCNC: 232 MG/DL (ref 70–130)
GLUCOSE SERPL-MCNC: 143 MG/DL (ref 65–99)
HCT VFR BLD AUTO: 48.6 % (ref 37.5–51)
HGB BLD-MCNC: 16.1 G/DL (ref 13–17.7)
LPA SERPL-SCNC: <8.4 NMOL/L
MCH RBC QN AUTO: 31.9 PG (ref 26.6–33)
MCHC RBC AUTO-ENTMCNC: 33.1 G/DL (ref 31.5–35.7)
MCV RBC AUTO: 96.4 FL (ref 79–97)
PLATELET # BLD AUTO: 171 10*3/MM3 (ref 140–450)
PMV BLD AUTO: 10.4 FL (ref 6–12)
POTASSIUM SERPL-SCNC: 4.3 MMOL/L (ref 3.5–5.2)
QT INTERVAL: 424 MS
QTC INTERVAL: 470 MS
RBC # BLD AUTO: 5.04 10*6/MM3 (ref 4.14–5.8)
SODIUM SERPL-SCNC: 142 MMOL/L (ref 136–145)
WBC NRBC COR # BLD AUTO: 8.33 10*3/MM3 (ref 3.4–10.8)

## 2025-08-21 PROCEDURE — 94010 BREATHING CAPACITY TEST: CPT

## 2025-08-21 PROCEDURE — 93880 EXTRACRANIAL BILAT STUDY: CPT | Performed by: INTERNAL MEDICINE

## 2025-08-21 PROCEDURE — 80048 BASIC METABOLIC PNL TOTAL CA: CPT | Performed by: INTERNAL MEDICINE

## 2025-08-21 PROCEDURE — 93970 EXTREMITY STUDY: CPT | Performed by: INTERNAL MEDICINE

## 2025-08-21 PROCEDURE — 93930 UPPER EXTREMITY STUDY: CPT

## 2025-08-21 PROCEDURE — 63710000001 INSULIN LISPRO (HUMAN) PER 5 UNITS: Performed by: INTERNAL MEDICINE

## 2025-08-21 PROCEDURE — 93970 EXTREMITY STUDY: CPT

## 2025-08-21 PROCEDURE — 93880 EXTRACRANIAL BILAT STUDY: CPT

## 2025-08-21 PROCEDURE — 99232 SBSQ HOSP IP/OBS MODERATE 35: CPT | Performed by: INTERNAL MEDICINE

## 2025-08-21 PROCEDURE — 25010000002 HEPARIN (PORCINE) PER 1000 UNITS: Performed by: FAMILY MEDICINE

## 2025-08-21 PROCEDURE — 85027 COMPLETE CBC AUTOMATED: CPT | Performed by: INTERNAL MEDICINE

## 2025-08-21 PROCEDURE — 99221 1ST HOSP IP/OBS SF/LOW 40: CPT

## 2025-08-21 PROCEDURE — 93930 UPPER EXTREMITY STUDY: CPT | Performed by: INTERNAL MEDICINE

## 2025-08-21 PROCEDURE — 93005 ELECTROCARDIOGRAM TRACING: CPT | Performed by: INTERNAL MEDICINE

## 2025-08-21 PROCEDURE — 82948 REAGENT STRIP/BLOOD GLUCOSE: CPT

## 2025-08-21 RX ORDER — IBUPROFEN 600 MG/1
1 TABLET ORAL
Status: DISCONTINUED | OUTPATIENT
Start: 2025-08-21 | End: 2025-08-29 | Stop reason: HOSPADM

## 2025-08-21 RX ORDER — INSULIN LISPRO 100 [IU]/ML
2-7 INJECTION, SOLUTION INTRAVENOUS; SUBCUTANEOUS
Status: DISCONTINUED | OUTPATIENT
Start: 2025-08-21 | End: 2025-08-29 | Stop reason: HOSPADM

## 2025-08-21 RX ORDER — AMLODIPINE BESYLATE 5 MG/1
5 TABLET ORAL
Status: DISCONTINUED | OUTPATIENT
Start: 2025-08-21 | End: 2025-08-26

## 2025-08-21 RX ORDER — DEXTROSE MONOHYDRATE 25 G/50ML
25 INJECTION, SOLUTION INTRAVENOUS
Status: DISCONTINUED | OUTPATIENT
Start: 2025-08-21 | End: 2025-08-29 | Stop reason: HOSPADM

## 2025-08-21 RX ORDER — LOSARTAN POTASSIUM 25 MG/1
25 TABLET ORAL
Status: DISCONTINUED | OUTPATIENT
Start: 2025-08-21 | End: 2025-08-26

## 2025-08-21 RX ORDER — NICOTINE POLACRILEX 4 MG
15 LOZENGE BUCCAL
Status: DISCONTINUED | OUTPATIENT
Start: 2025-08-21 | End: 2025-08-29 | Stop reason: HOSPADM

## 2025-08-21 RX ADMIN — INSULIN LISPRO 2 UNITS: 100 INJECTION, SOLUTION INTRAVENOUS; SUBCUTANEOUS at 22:09

## 2025-08-21 RX ADMIN — HEPARIN SODIUM 5000 UNITS: 5000 INJECTION INTRAVENOUS; SUBCUTANEOUS at 15:00

## 2025-08-21 RX ADMIN — AMLODIPINE BESYLATE 5 MG: 5 TABLET ORAL at 12:00

## 2025-08-21 RX ADMIN — ATORVASTATIN CALCIUM 40 MG: 40 TABLET, FILM COATED ORAL at 22:09

## 2025-08-21 RX ADMIN — LOSARTAN POTASSIUM 25 MG: 25 TABLET, FILM COATED ORAL at 22:09

## 2025-08-21 RX ADMIN — HEPARIN SODIUM 5000 UNITS: 5000 INJECTION INTRAVENOUS; SUBCUTANEOUS at 05:49

## 2025-08-21 RX ADMIN — HEPARIN SODIUM 5000 UNITS: 5000 INJECTION INTRAVENOUS; SUBCUTANEOUS at 22:08

## 2025-08-21 RX ADMIN — Medication 10 ML: at 22:10

## 2025-08-21 RX ADMIN — INSULIN LISPRO 3 UNITS: 100 INJECTION, SOLUTION INTRAVENOUS; SUBCUTANEOUS at 17:43

## 2025-08-21 RX ADMIN — METOPROLOL SUCCINATE 50 MG: 50 TABLET, EXTENDED RELEASE ORAL at 08:42

## 2025-08-21 RX ADMIN — Medication 10 ML: at 08:43

## 2025-08-21 RX ADMIN — FUROSEMIDE 40 MG: 40 TABLET ORAL at 08:43

## 2025-08-21 RX ADMIN — ASPIRIN 81 MG: 81 TABLET, DELAYED RELEASE ORAL at 08:43

## 2025-08-21 RX ADMIN — INSULIN LISPRO 2 UNITS: 100 INJECTION, SOLUTION INTRAVENOUS; SUBCUTANEOUS at 11:44

## 2025-08-22 ENCOUNTER — APPOINTMENT (OUTPATIENT)
Dept: MRI IMAGING | Facility: HOSPITAL | Age: 70
End: 2025-08-22
Payer: MEDICARE

## 2025-08-22 ENCOUNTER — TELEPHONE (OUTPATIENT)
Age: 70
End: 2025-08-22
Payer: MEDICARE

## 2025-08-22 LAB
APTT PPP: 38.5 SECONDS (ref 60–90)
BASOPHILS # BLD AUTO: 0.06 10*3/MM3 (ref 0–0.2)
BASOPHILS NFR BLD AUTO: 0.7 % (ref 0–1.5)
DEPRECATED RDW RBC AUTO: 46.2 FL (ref 37–54)
EOSINOPHIL # BLD AUTO: 0.13 10*3/MM3 (ref 0–0.4)
EOSINOPHIL NFR BLD AUTO: 1.6 % (ref 0.3–6.2)
ERYTHROCYTE [DISTWIDTH] IN BLOOD BY AUTOMATED COUNT: 13.1 % (ref 12.3–15.4)
GLUCOSE BLDC GLUCOMTR-MCNC: 149 MG/DL (ref 70–130)
GLUCOSE BLDC GLUCOMTR-MCNC: 197 MG/DL (ref 70–130)
GLUCOSE BLDC GLUCOMTR-MCNC: 205 MG/DL (ref 70–130)
GLUCOSE BLDC GLUCOMTR-MCNC: 205 MG/DL (ref 70–130)
HCT VFR BLD AUTO: 52.5 % (ref 37.5–51)
HGB BLD-MCNC: 17.2 G/DL (ref 13–17.7)
IMM GRANULOCYTES # BLD AUTO: 0.02 10*3/MM3 (ref 0–0.05)
IMM GRANULOCYTES NFR BLD AUTO: 0.2 % (ref 0–0.5)
INR PPP: 1.1 (ref 0.89–1.12)
LYMPHOCYTES # BLD AUTO: 1.23 10*3/MM3 (ref 0.7–3.1)
LYMPHOCYTES NFR BLD AUTO: 15.3 % (ref 19.6–45.3)
Lab: ABNORMAL
MCH RBC QN AUTO: 31.5 PG (ref 26.6–33)
MCHC RBC AUTO-ENTMCNC: 32.8 G/DL (ref 31.5–35.7)
MCV RBC AUTO: 96.2 FL (ref 79–97)
MONOCYTES # BLD AUTO: 0.92 10*3/MM3 (ref 0.1–0.9)
MONOCYTES NFR BLD AUTO: 11.4 % (ref 5–12)
NEUTROPHILS NFR BLD AUTO: 5.68 10*3/MM3 (ref 1.7–7)
NEUTROPHILS NFR BLD AUTO: 70.8 % (ref 42.7–76)
NRBC BLD AUTO-RTO: 0 /100 WBC (ref 0–0.2)
PLATELET # BLD AUTO: 184 10*3/MM3 (ref 140–450)
PMV BLD AUTO: 10.2 FL (ref 6–12)
PROTHROMBIN TIME: 14.9 SECONDS (ref 12.2–15.3)
RBC # BLD AUTO: 5.46 10*6/MM3 (ref 4.14–5.8)
UFH PPP CHRO-ACNC: 0.1 IU/ML (ref 0.3–0.7)
UFH PPP CHRO-ACNC: 0.37 IU/ML (ref 0.3–0.7)
WBC NRBC COR # BLD AUTO: 8.04 10*3/MM3 (ref 3.4–10.8)

## 2025-08-22 PROCEDURE — 82948 REAGENT STRIP/BLOOD GLUCOSE: CPT | Performed by: INTERNAL MEDICINE

## 2025-08-22 PROCEDURE — 85520 HEPARIN ASSAY: CPT

## 2025-08-22 PROCEDURE — 75561 CARDIAC MRI FOR MORPH W/DYE: CPT

## 2025-08-22 PROCEDURE — 82948 REAGENT STRIP/BLOOD GLUCOSE: CPT

## 2025-08-22 PROCEDURE — 99232 SBSQ HOSP IP/OBS MODERATE 35: CPT

## 2025-08-22 PROCEDURE — 85730 THROMBOPLASTIN TIME PARTIAL: CPT

## 2025-08-22 PROCEDURE — 25510000002 GADOBENATE DIMEGLUMINE 529 MG/ML SOLUTION: Performed by: INTERNAL MEDICINE

## 2025-08-22 PROCEDURE — 99232 SBSQ HOSP IP/OBS MODERATE 35: CPT | Performed by: INTERNAL MEDICINE

## 2025-08-22 PROCEDURE — 85610 PROTHROMBIN TIME: CPT

## 2025-08-22 PROCEDURE — 85025 COMPLETE CBC W/AUTO DIFF WBC: CPT

## 2025-08-22 PROCEDURE — 25010000002 HEPARIN (PORCINE) PER 1000 UNITS: Performed by: FAMILY MEDICINE

## 2025-08-22 PROCEDURE — 63710000001 INSULIN LISPRO (HUMAN) PER 5 UNITS: Performed by: INTERNAL MEDICINE

## 2025-08-22 PROCEDURE — 25010000002 HEPARIN (PORCINE) 25000-0.45 UT/250ML-% SOLUTION

## 2025-08-22 PROCEDURE — A9577 INJ MULTIHANCE: HCPCS | Performed by: INTERNAL MEDICINE

## 2025-08-22 RX ORDER — LOSARTAN POTASSIUM 25 MG/1
25 TABLET ORAL DAILY
Qty: 30 TABLET | Refills: 0 | Status: SHIPPED | OUTPATIENT
Start: 2025-08-22 | End: 2025-08-29 | Stop reason: HOSPADM

## 2025-08-22 RX ORDER — FUROSEMIDE 40 MG/1
40 TABLET ORAL DAILY
Qty: 30 TABLET | Refills: 0 | Status: SHIPPED | OUTPATIENT
Start: 2025-08-22 | End: 2025-09-21

## 2025-08-22 RX ORDER — AMLODIPINE BESYLATE 5 MG/1
5 TABLET ORAL
Qty: 30 TABLET | Refills: 0 | Status: SHIPPED | OUTPATIENT
Start: 2025-08-22 | End: 2025-08-29 | Stop reason: HOSPADM

## 2025-08-22 RX ORDER — HEPARIN SODIUM 10000 [USP'U]/100ML
12 INJECTION, SOLUTION INTRAVENOUS
Status: DISCONTINUED | OUTPATIENT
Start: 2025-08-22 | End: 2025-08-24

## 2025-08-22 RX ORDER — ATORVASTATIN CALCIUM 40 MG/1
40 TABLET, FILM COATED ORAL NIGHTLY
Qty: 90 TABLET | Refills: 0 | Status: SHIPPED | OUTPATIENT
Start: 2025-08-22 | End: 2025-11-20

## 2025-08-22 RX ORDER — HEPARIN SODIUM 1000 [USP'U]/ML
2000 INJECTION, SOLUTION INTRAVENOUS; SUBCUTANEOUS AS NEEDED
Status: DISCONTINUED | OUTPATIENT
Start: 2025-08-22 | End: 2025-08-22

## 2025-08-22 RX ORDER — HEPARIN SODIUM 1000 [USP'U]/ML
4000 INJECTION, SOLUTION INTRAVENOUS; SUBCUTANEOUS AS NEEDED
Status: DISCONTINUED | OUTPATIENT
Start: 2025-08-22 | End: 2025-08-22

## 2025-08-22 RX ADMIN — LOSARTAN POTASSIUM 25 MG: 25 TABLET, FILM COATED ORAL at 08:14

## 2025-08-22 RX ADMIN — INSULIN LISPRO 3 UNITS: 100 INJECTION, SOLUTION INTRAVENOUS; SUBCUTANEOUS at 21:45

## 2025-08-22 RX ADMIN — GADOBENATE DIMEGLUMINE 15 ML: 529 INJECTION, SOLUTION INTRAVENOUS at 12:20

## 2025-08-22 RX ADMIN — AMLODIPINE BESYLATE 5 MG: 5 TABLET ORAL at 08:14

## 2025-08-22 RX ADMIN — ASPIRIN 81 MG: 81 TABLET, DELAYED RELEASE ORAL at 08:14

## 2025-08-22 RX ADMIN — HEPARIN SODIUM 5000 UNITS: 5000 INJECTION INTRAVENOUS; SUBCUTANEOUS at 13:13

## 2025-08-22 RX ADMIN — HEPARIN SODIUM 10 UNITS/KG/HR: 10000 INJECTION, SOLUTION INTRAVENOUS at 14:07

## 2025-08-22 RX ADMIN — INSULIN LISPRO 3 UNITS: 100 INJECTION, SOLUTION INTRAVENOUS; SUBCUTANEOUS at 16:55

## 2025-08-22 RX ADMIN — Medication 10 ML: at 08:15

## 2025-08-22 RX ADMIN — ATORVASTATIN CALCIUM 40 MG: 40 TABLET, FILM COATED ORAL at 21:45

## 2025-08-22 RX ADMIN — FUROSEMIDE 40 MG: 40 TABLET ORAL at 08:14

## 2025-08-22 RX ADMIN — INSULIN LISPRO 2 UNITS: 100 INJECTION, SOLUTION INTRAVENOUS; SUBCUTANEOUS at 13:13

## 2025-08-22 RX ADMIN — METOPROLOL SUCCINATE 50 MG: 50 TABLET, EXTENDED RELEASE ORAL at 08:15

## 2025-08-22 RX ADMIN — Medication 10 ML: at 21:45

## 2025-08-22 RX ADMIN — HEPARIN SODIUM 5000 UNITS: 5000 INJECTION INTRAVENOUS; SUBCUTANEOUS at 05:46

## 2025-08-23 LAB
ANION GAP SERPL CALCULATED.3IONS-SCNC: 11.2 MMOL/L (ref 5–15)
BASOPHILS # BLD AUTO: 0.05 10*3/MM3 (ref 0–0.2)
BASOPHILS NFR BLD AUTO: 0.6 % (ref 0–1.5)
BUN SERPL-MCNC: 34.2 MG/DL (ref 8–23)
BUN/CREAT SERPL: 32.6 (ref 7–25)
CALCIUM SPEC-SCNC: 9.2 MG/DL (ref 8.6–10.5)
CHLORIDE SERPL-SCNC: 104 MMOL/L (ref 98–107)
CO2 SERPL-SCNC: 27.8 MMOL/L (ref 22–29)
CREAT SERPL-MCNC: 1.05 MG/DL (ref 0.76–1.27)
DEPRECATED RDW RBC AUTO: 46.5 FL (ref 37–54)
EGFRCR SERPLBLD CKD-EPI 2021: 75.4 ML/MIN/1.73
EOSINOPHIL # BLD AUTO: 0.19 10*3/MM3 (ref 0–0.4)
EOSINOPHIL NFR BLD AUTO: 2.1 % (ref 0.3–6.2)
ERYTHROCYTE [DISTWIDTH] IN BLOOD BY AUTOMATED COUNT: 13.1 % (ref 12.3–15.4)
GLUCOSE BLDC GLUCOMTR-MCNC: 159 MG/DL (ref 70–130)
GLUCOSE BLDC GLUCOMTR-MCNC: 181 MG/DL (ref 70–130)
GLUCOSE BLDC GLUCOMTR-MCNC: 188 MG/DL (ref 70–130)
GLUCOSE BLDC GLUCOMTR-MCNC: 297 MG/DL (ref 70–130)
GLUCOSE SERPL-MCNC: 146 MG/DL (ref 65–99)
HCT VFR BLD AUTO: 50.4 % (ref 37.5–51)
HGB BLD-MCNC: 16.6 G/DL (ref 13–17.7)
IMM GRANULOCYTES # BLD AUTO: 0.03 10*3/MM3 (ref 0–0.05)
IMM GRANULOCYTES NFR BLD AUTO: 0.3 % (ref 0–0.5)
LYMPHOCYTES # BLD AUTO: 1.76 10*3/MM3 (ref 0.7–3.1)
LYMPHOCYTES NFR BLD AUTO: 19.5 % (ref 19.6–45.3)
MAGNESIUM SERPL-MCNC: 2.3 MG/DL (ref 1.6–2.4)
MCH RBC QN AUTO: 31.7 PG (ref 26.6–33)
MCHC RBC AUTO-ENTMCNC: 32.9 G/DL (ref 31.5–35.7)
MCV RBC AUTO: 96.2 FL (ref 79–97)
MONOCYTES # BLD AUTO: 1.14 10*3/MM3 (ref 0.1–0.9)
MONOCYTES NFR BLD AUTO: 12.6 % (ref 5–12)
NEUTROPHILS NFR BLD AUTO: 5.85 10*3/MM3 (ref 1.7–7)
NEUTROPHILS NFR BLD AUTO: 64.9 % (ref 42.7–76)
NRBC BLD AUTO-RTO: 0 /100 WBC (ref 0–0.2)
PA ADP PRP-ACNC: 147 PRU
PLATELET # BLD AUTO: 169 10*3/MM3 (ref 140–450)
PMV BLD AUTO: 10.4 FL (ref 6–12)
POTASSIUM SERPL-SCNC: 4.5 MMOL/L (ref 3.5–5.2)
QT INTERVAL: 420 MS
QTC INTERVAL: 481 MS
RBC # BLD AUTO: 5.24 10*6/MM3 (ref 4.14–5.8)
SODIUM SERPL-SCNC: 143 MMOL/L (ref 136–145)
UFH PPP CHRO-ACNC: 0.35 IU/ML (ref 0.3–0.7)
WBC NRBC COR # BLD AUTO: 9.02 10*3/MM3 (ref 3.4–10.8)

## 2025-08-23 PROCEDURE — 85576 BLOOD PLATELET AGGREGATION: CPT | Performed by: PHYSICIAN ASSISTANT

## 2025-08-23 PROCEDURE — 80048 BASIC METABOLIC PNL TOTAL CA: CPT | Performed by: INTERNAL MEDICINE

## 2025-08-23 PROCEDURE — 93005 ELECTROCARDIOGRAM TRACING: CPT | Performed by: INTERNAL MEDICINE

## 2025-08-23 PROCEDURE — 82948 REAGENT STRIP/BLOOD GLUCOSE: CPT | Performed by: INTERNAL MEDICINE

## 2025-08-23 PROCEDURE — 82948 REAGENT STRIP/BLOOD GLUCOSE: CPT

## 2025-08-23 PROCEDURE — 25010000002 MAGNESIUM SULFATE 2 GM/50ML SOLUTION: Performed by: INTERNAL MEDICINE

## 2025-08-23 PROCEDURE — 85025 COMPLETE CBC W/AUTO DIFF WBC: CPT

## 2025-08-23 PROCEDURE — 83735 ASSAY OF MAGNESIUM: CPT | Performed by: INTERNAL MEDICINE

## 2025-08-23 PROCEDURE — 63710000001 INSULIN LISPRO (HUMAN) PER 5 UNITS: Performed by: INTERNAL MEDICINE

## 2025-08-23 PROCEDURE — 25010000002 HEPARIN (PORCINE) 25000-0.45 UT/250ML-% SOLUTION

## 2025-08-23 PROCEDURE — 99232 SBSQ HOSP IP/OBS MODERATE 35: CPT

## 2025-08-23 PROCEDURE — 85520 HEPARIN ASSAY: CPT

## 2025-08-23 RX ORDER — METOPROLOL SUCCINATE 25 MG/1
25 TABLET, EXTENDED RELEASE ORAL DAILY
Status: DISCONTINUED | OUTPATIENT
Start: 2025-08-23 | End: 2025-08-29

## 2025-08-23 RX ORDER — MAGNESIUM SULFATE HEPTAHYDRATE 40 MG/ML
2 INJECTION, SOLUTION INTRAVENOUS ONCE
Status: COMPLETED | OUTPATIENT
Start: 2025-08-23 | End: 2025-08-23

## 2025-08-23 RX ADMIN — INSULIN LISPRO 2 UNITS: 100 INJECTION, SOLUTION INTRAVENOUS; SUBCUTANEOUS at 11:30

## 2025-08-23 RX ADMIN — LOSARTAN POTASSIUM 25 MG: 25 TABLET, FILM COATED ORAL at 09:13

## 2025-08-23 RX ADMIN — ASPIRIN 81 MG: 81 TABLET, DELAYED RELEASE ORAL at 09:13

## 2025-08-23 RX ADMIN — INSULIN LISPRO 2 UNITS: 100 INJECTION, SOLUTION INTRAVENOUS; SUBCUTANEOUS at 16:31

## 2025-08-23 RX ADMIN — FUROSEMIDE 40 MG: 40 TABLET ORAL at 09:13

## 2025-08-23 RX ADMIN — ATORVASTATIN CALCIUM 40 MG: 40 TABLET, FILM COATED ORAL at 20:49

## 2025-08-23 RX ADMIN — Medication 10 ML: at 09:15

## 2025-08-23 RX ADMIN — INSULIN LISPRO 2 UNITS: 100 INJECTION, SOLUTION INTRAVENOUS; SUBCUTANEOUS at 20:49

## 2025-08-23 RX ADMIN — INSULIN LISPRO 2 UNITS: 100 INJECTION, SOLUTION INTRAVENOUS; SUBCUTANEOUS at 09:14

## 2025-08-23 RX ADMIN — HEPARIN SODIUM 10 UNITS/KG/HR: 10000 INJECTION, SOLUTION INTRAVENOUS at 09:15

## 2025-08-23 RX ADMIN — MAGNESIUM SULFATE IN WATER FOR 2 G: 40 INJECTION INTRAVENOUS at 09:15

## 2025-08-23 RX ADMIN — METOPROLOL SUCCINATE 25 MG: 25 TABLET, EXTENDED RELEASE ORAL at 09:13

## 2025-08-23 RX ADMIN — AMLODIPINE BESYLATE 5 MG: 5 TABLET ORAL at 09:13

## 2025-08-24 LAB
ANION GAP SERPL CALCULATED.3IONS-SCNC: 11 MMOL/L (ref 5–15)
BUN SERPL-MCNC: 32.2 MG/DL (ref 8–23)
BUN/CREAT SERPL: 36.2 (ref 7–25)
CALCIUM SPEC-SCNC: 8.8 MG/DL (ref 8.6–10.5)
CHLORIDE SERPL-SCNC: 101 MMOL/L (ref 98–107)
CO2 SERPL-SCNC: 26 MMOL/L (ref 22–29)
CREAT SERPL-MCNC: 0.89 MG/DL (ref 0.76–1.27)
DEPRECATED RDW RBC AUTO: 45.9 FL (ref 37–54)
EGFRCR SERPLBLD CKD-EPI 2021: 91.1 ML/MIN/1.73
ERYTHROCYTE [DISTWIDTH] IN BLOOD BY AUTOMATED COUNT: 12.9 % (ref 12.3–15.4)
GLUCOSE BLDC GLUCOMTR-MCNC: 153 MG/DL (ref 70–130)
GLUCOSE BLDC GLUCOMTR-MCNC: 161 MG/DL (ref 70–130)
GLUCOSE BLDC GLUCOMTR-MCNC: 210 MG/DL (ref 70–130)
GLUCOSE BLDC GLUCOMTR-MCNC: 210 MG/DL (ref 70–130)
GLUCOSE SERPL-MCNC: 134 MG/DL (ref 65–99)
HCT VFR BLD AUTO: 50.4 % (ref 37.5–51)
HGB BLD-MCNC: 16.4 G/DL (ref 13–17.7)
Lab: ABNORMAL
Lab: ABNORMAL
MAGNESIUM SERPL-MCNC: 2.4 MG/DL (ref 1.6–2.4)
MCH RBC QN AUTO: 31.2 PG (ref 26.6–33)
MCHC RBC AUTO-ENTMCNC: 32.5 G/DL (ref 31.5–35.7)
MCV RBC AUTO: 96 FL (ref 79–97)
PLATELET # BLD AUTO: 171 10*3/MM3 (ref 140–450)
PMV BLD AUTO: 10.5 FL (ref 6–12)
POTASSIUM SERPL-SCNC: 4.3 MMOL/L (ref 3.5–5.2)
RBC # BLD AUTO: 5.25 10*6/MM3 (ref 4.14–5.8)
SODIUM SERPL-SCNC: 138 MMOL/L (ref 136–145)
UFH PPP CHRO-ACNC: 0.29 IU/ML (ref 0.3–0.7)
UFH PPP CHRO-ACNC: 0.29 IU/ML (ref 0.3–0.7)
UFH PPP CHRO-ACNC: 0.61 IU/ML (ref 0.3–0.7)
WBC NRBC COR # BLD AUTO: 8.33 10*3/MM3 (ref 3.4–10.8)

## 2025-08-24 PROCEDURE — 93005 ELECTROCARDIOGRAM TRACING: CPT | Performed by: INTERNAL MEDICINE

## 2025-08-24 PROCEDURE — 63710000001 INSULIN LISPRO (HUMAN) PER 5 UNITS: Performed by: INTERNAL MEDICINE

## 2025-08-24 PROCEDURE — 99232 SBSQ HOSP IP/OBS MODERATE 35: CPT

## 2025-08-24 PROCEDURE — 25010000002 HEPARIN (PORCINE) 25000-0.45 UT/250ML-% SOLUTION

## 2025-08-24 PROCEDURE — 85520 HEPARIN ASSAY: CPT

## 2025-08-24 PROCEDURE — 82948 REAGENT STRIP/BLOOD GLUCOSE: CPT

## 2025-08-24 PROCEDURE — 25010000002 ENOXAPARIN PER 10 MG

## 2025-08-24 PROCEDURE — 83735 ASSAY OF MAGNESIUM: CPT

## 2025-08-24 PROCEDURE — 85027 COMPLETE CBC AUTOMATED: CPT

## 2025-08-24 PROCEDURE — 80048 BASIC METABOLIC PNL TOTAL CA: CPT

## 2025-08-24 RX ORDER — ENOXAPARIN SODIUM 150 MG/ML
105 INJECTION SUBCUTANEOUS EVERY 12 HOURS
Status: DISCONTINUED | OUTPATIENT
Start: 2025-08-24 | End: 2025-08-28

## 2025-08-24 RX ADMIN — METOPROLOL SUCCINATE 25 MG: 25 TABLET, EXTENDED RELEASE ORAL at 08:37

## 2025-08-24 RX ADMIN — FUROSEMIDE 40 MG: 40 TABLET ORAL at 08:37

## 2025-08-24 RX ADMIN — Medication 10 ML: at 20:41

## 2025-08-24 RX ADMIN — AMLODIPINE BESYLATE 5 MG: 5 TABLET ORAL at 08:37

## 2025-08-24 RX ADMIN — ENOXAPARIN SODIUM 105 MG: 150 INJECTION SUBCUTANEOUS at 18:10

## 2025-08-24 RX ADMIN — INSULIN LISPRO 2 UNITS: 100 INJECTION, SOLUTION INTRAVENOUS; SUBCUTANEOUS at 12:04

## 2025-08-24 RX ADMIN — ASPIRIN 81 MG: 81 TABLET, DELAYED RELEASE ORAL at 08:37

## 2025-08-24 RX ADMIN — HEPARIN SODIUM 11 UNITS/KG/HR: 10000 INJECTION, SOLUTION INTRAVENOUS at 12:05

## 2025-08-24 RX ADMIN — INSULIN LISPRO 3 UNITS: 100 INJECTION, SOLUTION INTRAVENOUS; SUBCUTANEOUS at 20:40

## 2025-08-24 RX ADMIN — ATORVASTATIN CALCIUM 40 MG: 40 TABLET, FILM COATED ORAL at 20:40

## 2025-08-24 RX ADMIN — INSULIN LISPRO 2 UNITS: 100 INJECTION, SOLUTION INTRAVENOUS; SUBCUTANEOUS at 08:38

## 2025-08-24 RX ADMIN — Medication 10 ML: at 08:38

## 2025-08-24 RX ADMIN — LOSARTAN POTASSIUM 25 MG: 25 TABLET, FILM COATED ORAL at 08:37

## 2025-08-24 RX ADMIN — INSULIN LISPRO 3 UNITS: 100 INJECTION, SOLUTION INTRAVENOUS; SUBCUTANEOUS at 16:30

## 2025-08-25 LAB
ANION GAP SERPL CALCULATED.3IONS-SCNC: 9 MMOL/L (ref 5–15)
BUN SERPL-MCNC: 35.5 MG/DL (ref 8–23)
BUN/CREAT SERPL: 40.8 (ref 7–25)
CALCIUM SPEC-SCNC: 9 MG/DL (ref 8.6–10.5)
CHLORIDE SERPL-SCNC: 103 MMOL/L (ref 98–107)
CO2 SERPL-SCNC: 27 MMOL/L (ref 22–29)
CREAT SERPL-MCNC: 0.87 MG/DL (ref 0.76–1.27)
DEPRECATED RDW RBC AUTO: 45.4 FL (ref 37–54)
EGFRCR SERPLBLD CKD-EPI 2021: 91.7 ML/MIN/1.73
ERYTHROCYTE [DISTWIDTH] IN BLOOD BY AUTOMATED COUNT: 12.8 % (ref 12.3–15.4)
GLUCOSE BLDC GLUCOMTR-MCNC: 160 MG/DL (ref 70–130)
GLUCOSE BLDC GLUCOMTR-MCNC: 183 MG/DL (ref 70–130)
GLUCOSE BLDC GLUCOMTR-MCNC: 187 MG/DL (ref 70–130)
GLUCOSE BLDC GLUCOMTR-MCNC: 235 MG/DL (ref 70–130)
GLUCOSE SERPL-MCNC: 135 MG/DL (ref 65–99)
HCT VFR BLD AUTO: 51.3 % (ref 37.5–51)
HGB BLD-MCNC: 16.8 G/DL (ref 13–17.7)
MAGNESIUM SERPL-MCNC: 2.2 MG/DL (ref 1.6–2.4)
MCH RBC QN AUTO: 31.8 PG (ref 26.6–33)
MCHC RBC AUTO-ENTMCNC: 32.7 G/DL (ref 31.5–35.7)
MCV RBC AUTO: 97.2 FL (ref 79–97)
PA ADP PRP-ACNC: 168 PRU
PLATELET # BLD AUTO: 177 10*3/MM3 (ref 140–450)
PMV BLD AUTO: 11 FL (ref 6–12)
POTASSIUM SERPL-SCNC: 4.1 MMOL/L (ref 3.5–5.2)
RBC # BLD AUTO: 5.28 10*6/MM3 (ref 4.14–5.8)
SODIUM SERPL-SCNC: 139 MMOL/L (ref 136–145)
WBC NRBC COR # BLD AUTO: 6.76 10*3/MM3 (ref 3.4–10.8)

## 2025-08-25 PROCEDURE — 82948 REAGENT STRIP/BLOOD GLUCOSE: CPT

## 2025-08-25 PROCEDURE — 85576 BLOOD PLATELET AGGREGATION: CPT | Performed by: PHYSICIAN ASSISTANT

## 2025-08-25 PROCEDURE — 25010000002 ENOXAPARIN PER 10 MG

## 2025-08-25 PROCEDURE — 99232 SBSQ HOSP IP/OBS MODERATE 35: CPT

## 2025-08-25 PROCEDURE — 85027 COMPLETE CBC AUTOMATED: CPT

## 2025-08-25 PROCEDURE — 80048 BASIC METABOLIC PNL TOTAL CA: CPT

## 2025-08-25 PROCEDURE — 83735 ASSAY OF MAGNESIUM: CPT

## 2025-08-25 PROCEDURE — 63710000001 INSULIN LISPRO (HUMAN) PER 5 UNITS: Performed by: INTERNAL MEDICINE

## 2025-08-25 PROCEDURE — 82948 REAGENT STRIP/BLOOD GLUCOSE: CPT | Performed by: INTERNAL MEDICINE

## 2025-08-25 RX ADMIN — AMLODIPINE BESYLATE 5 MG: 5 TABLET ORAL at 08:07

## 2025-08-25 RX ADMIN — INSULIN LISPRO 2 UNITS: 100 INJECTION, SOLUTION INTRAVENOUS; SUBCUTANEOUS at 11:28

## 2025-08-25 RX ADMIN — LOSARTAN POTASSIUM 25 MG: 25 TABLET, FILM COATED ORAL at 08:07

## 2025-08-25 RX ADMIN — ENOXAPARIN SODIUM 105 MG: 150 INJECTION SUBCUTANEOUS at 16:53

## 2025-08-25 RX ADMIN — FUROSEMIDE 40 MG: 40 TABLET ORAL at 08:07

## 2025-08-25 RX ADMIN — INSULIN LISPRO 2 UNITS: 100 INJECTION, SOLUTION INTRAVENOUS; SUBCUTANEOUS at 16:53

## 2025-08-25 RX ADMIN — Medication 10 ML: at 08:08

## 2025-08-25 RX ADMIN — INSULIN LISPRO 2 UNITS: 100 INJECTION, SOLUTION INTRAVENOUS; SUBCUTANEOUS at 08:06

## 2025-08-25 RX ADMIN — ASPIRIN 81 MG: 81 TABLET, DELAYED RELEASE ORAL at 08:07

## 2025-08-25 RX ADMIN — ENOXAPARIN SODIUM 105 MG: 150 INJECTION SUBCUTANEOUS at 05:35

## 2025-08-25 RX ADMIN — ATORVASTATIN CALCIUM 40 MG: 40 TABLET, FILM COATED ORAL at 20:55

## 2025-08-25 RX ADMIN — INSULIN LISPRO 3 UNITS: 100 INJECTION, SOLUTION INTRAVENOUS; SUBCUTANEOUS at 20:56

## 2025-08-25 RX ADMIN — METOPROLOL SUCCINATE 25 MG: 25 TABLET, EXTENDED RELEASE ORAL at 08:06

## 2025-08-25 RX ADMIN — Medication 10 ML: at 20:56

## 2025-08-26 LAB
ANION GAP SERPL CALCULATED.3IONS-SCNC: 10 MMOL/L (ref 5–15)
BASOPHILS # BLD AUTO: 0.07 10*3/MM3 (ref 0–0.2)
BASOPHILS NFR BLD AUTO: 1.1 % (ref 0–1.5)
BUN SERPL-MCNC: 30.3 MG/DL (ref 8–23)
BUN/CREAT SERPL: 37 (ref 7–25)
CALCIUM SPEC-SCNC: 9.1 MG/DL (ref 8.6–10.5)
CHLORIDE SERPL-SCNC: 103 MMOL/L (ref 98–107)
CO2 SERPL-SCNC: 26 MMOL/L (ref 22–29)
CREAT SERPL-MCNC: 0.82 MG/DL (ref 0.76–1.27)
DEPRECATED RDW RBC AUTO: 45.2 FL (ref 37–54)
EGFRCR SERPLBLD CKD-EPI 2021: 93.3 ML/MIN/1.73
EOSINOPHIL # BLD AUTO: 0.15 10*3/MM3 (ref 0–0.4)
EOSINOPHIL NFR BLD AUTO: 2.4 % (ref 0.3–6.2)
ERYTHROCYTE [DISTWIDTH] IN BLOOD BY AUTOMATED COUNT: 12.7 % (ref 12.3–15.4)
GLUCOSE BLDC GLUCOMTR-MCNC: 164 MG/DL (ref 70–130)
GLUCOSE BLDC GLUCOMTR-MCNC: 171 MG/DL (ref 70–130)
GLUCOSE BLDC GLUCOMTR-MCNC: 217 MG/DL (ref 70–130)
GLUCOSE BLDC GLUCOMTR-MCNC: 236 MG/DL (ref 70–130)
GLUCOSE SERPL-MCNC: 143 MG/DL (ref 65–99)
HCT VFR BLD AUTO: 49.9 % (ref 37.5–51)
HGB BLD-MCNC: 16.6 G/DL (ref 13–17.7)
IMM GRANULOCYTES # BLD AUTO: 0.02 10*3/MM3 (ref 0–0.05)
IMM GRANULOCYTES NFR BLD AUTO: 0.3 % (ref 0–0.5)
LYMPHOCYTES # BLD AUTO: 1.64 10*3/MM3 (ref 0.7–3.1)
LYMPHOCYTES NFR BLD AUTO: 25.7 % (ref 19.6–45.3)
MAGNESIUM SERPL-MCNC: 2.4 MG/DL (ref 1.6–2.4)
MCH RBC QN AUTO: 32 PG (ref 26.6–33)
MCHC RBC AUTO-ENTMCNC: 33.3 G/DL (ref 31.5–35.7)
MCV RBC AUTO: 96.1 FL (ref 79–97)
MONOCYTES # BLD AUTO: 0.74 10*3/MM3 (ref 0.1–0.9)
MONOCYTES NFR BLD AUTO: 11.6 % (ref 5–12)
NEUTROPHILS NFR BLD AUTO: 3.76 10*3/MM3 (ref 1.7–7)
NEUTROPHILS NFR BLD AUTO: 58.9 % (ref 42.7–76)
NRBC BLD AUTO-RTO: 0 /100 WBC (ref 0–0.2)
PA ADP PRP-ACNC: 170 PRU
PLATELET # BLD AUTO: 153 10*3/MM3 (ref 140–450)
PMV BLD AUTO: 10.2 FL (ref 6–12)
POTASSIUM SERPL-SCNC: 4 MMOL/L (ref 3.5–5.2)
RBC # BLD AUTO: 5.19 10*6/MM3 (ref 4.14–5.8)
SODIUM SERPL-SCNC: 139 MMOL/L (ref 136–145)
WBC NRBC COR # BLD AUTO: 6.38 10*3/MM3 (ref 3.4–10.8)

## 2025-08-26 PROCEDURE — 99232 SBSQ HOSP IP/OBS MODERATE 35: CPT

## 2025-08-26 PROCEDURE — 94010 BREATHING CAPACITY TEST: CPT | Performed by: INTERNAL MEDICINE

## 2025-08-26 PROCEDURE — 63710000001 INSULIN LISPRO (HUMAN) PER 5 UNITS: Performed by: INTERNAL MEDICINE

## 2025-08-26 PROCEDURE — 80048 BASIC METABOLIC PNL TOTAL CA: CPT

## 2025-08-26 PROCEDURE — 83735 ASSAY OF MAGNESIUM: CPT

## 2025-08-26 PROCEDURE — 85576 BLOOD PLATELET AGGREGATION: CPT | Performed by: PHYSICIAN ASSISTANT

## 2025-08-26 PROCEDURE — 82948 REAGENT STRIP/BLOOD GLUCOSE: CPT

## 2025-08-26 PROCEDURE — 25010000002 ENOXAPARIN PER 10 MG

## 2025-08-26 PROCEDURE — 82948 REAGENT STRIP/BLOOD GLUCOSE: CPT | Performed by: INTERNAL MEDICINE

## 2025-08-26 PROCEDURE — 85025 COMPLETE CBC W/AUTO DIFF WBC: CPT

## 2025-08-26 RX ADMIN — AMLODIPINE BESYLATE 5 MG: 5 TABLET ORAL at 09:14

## 2025-08-26 RX ADMIN — ENOXAPARIN SODIUM 105 MG: 150 INJECTION SUBCUTANEOUS at 18:04

## 2025-08-26 RX ADMIN — INSULIN LISPRO 3 UNITS: 100 INJECTION, SOLUTION INTRAVENOUS; SUBCUTANEOUS at 12:21

## 2025-08-26 RX ADMIN — ENOXAPARIN SODIUM 105 MG: 150 INJECTION SUBCUTANEOUS at 06:04

## 2025-08-26 RX ADMIN — ASPIRIN 81 MG: 81 TABLET, DELAYED RELEASE ORAL at 09:14

## 2025-08-26 RX ADMIN — METOPROLOL SUCCINATE 25 MG: 25 TABLET, EXTENDED RELEASE ORAL at 09:14

## 2025-08-26 RX ADMIN — INSULIN LISPRO 3 UNITS: 100 INJECTION, SOLUTION INTRAVENOUS; SUBCUTANEOUS at 18:04

## 2025-08-26 RX ADMIN — ATORVASTATIN CALCIUM 40 MG: 40 TABLET, FILM COATED ORAL at 21:38

## 2025-08-26 RX ADMIN — INSULIN LISPRO 2 UNITS: 100 INJECTION, SOLUTION INTRAVENOUS; SUBCUTANEOUS at 09:13

## 2025-08-26 RX ADMIN — FUROSEMIDE 40 MG: 40 TABLET ORAL at 09:14

## 2025-08-26 RX ADMIN — Medication 10 ML: at 09:14

## 2025-08-26 RX ADMIN — LOSARTAN POTASSIUM 25 MG: 25 TABLET, FILM COATED ORAL at 09:14

## 2025-08-26 RX ADMIN — Medication 10 ML: at 21:38

## 2025-08-26 RX ADMIN — INSULIN LISPRO 2 UNITS: 100 INJECTION, SOLUTION INTRAVENOUS; SUBCUTANEOUS at 21:38

## 2025-08-27 LAB
ANION GAP SERPL CALCULATED.3IONS-SCNC: 14.5 MMOL/L (ref 5–15)
BUN SERPL-MCNC: 24.7 MG/DL (ref 8–23)
BUN/CREAT SERPL: 26.6 (ref 7–25)
CALCIUM SPEC-SCNC: 9.4 MG/DL (ref 8.6–10.5)
CHLORIDE SERPL-SCNC: 102 MMOL/L (ref 98–107)
CO2 SERPL-SCNC: 20.5 MMOL/L (ref 22–29)
CREAT SERPL-MCNC: 0.93 MG/DL (ref 0.76–1.27)
DEPRECATED RDW RBC AUTO: 44.7 FL (ref 37–54)
EGFRCR SERPLBLD CKD-EPI 2021: 87.2 ML/MIN/1.73
ERYTHROCYTE [DISTWIDTH] IN BLOOD BY AUTOMATED COUNT: 12.8 % (ref 12.3–15.4)
GLUCOSE BLDC GLUCOMTR-MCNC: 167 MG/DL (ref 70–130)
GLUCOSE BLDC GLUCOMTR-MCNC: 184 MG/DL (ref 70–130)
GLUCOSE BLDC GLUCOMTR-MCNC: 245 MG/DL (ref 70–130)
GLUCOSE BLDC GLUCOMTR-MCNC: 319 MG/DL (ref 70–130)
GLUCOSE SERPL-MCNC: 237 MG/DL (ref 65–99)
HCT VFR BLD AUTO: 49.4 % (ref 37.5–51)
HGB BLD-MCNC: 16.7 G/DL (ref 13–17.7)
MAGNESIUM SERPL-MCNC: 1.9 MG/DL (ref 1.6–2.4)
MCH RBC QN AUTO: 31.9 PG (ref 26.6–33)
MCHC RBC AUTO-ENTMCNC: 33.8 G/DL (ref 31.5–35.7)
MCV RBC AUTO: 94.3 FL (ref 79–97)
PLATELET # BLD AUTO: 138 10*3/MM3 (ref 140–450)
PMV BLD AUTO: 11.1 FL (ref 6–12)
POTASSIUM SERPL-SCNC: 4.6 MMOL/L (ref 3.5–5.2)
QT INTERVAL: 406 MS
QTC INTERVAL: 512 MS
RBC # BLD AUTO: 5.24 10*6/MM3 (ref 4.14–5.8)
SODIUM SERPL-SCNC: 137 MMOL/L (ref 136–145)
WBC NRBC COR # BLD AUTO: 6.52 10*3/MM3 (ref 3.4–10.8)

## 2025-08-27 PROCEDURE — 99232 SBSQ HOSP IP/OBS MODERATE 35: CPT

## 2025-08-27 PROCEDURE — 25010000002 MAGNESIUM SULFATE 4 GM/100ML SOLUTION

## 2025-08-27 PROCEDURE — 82948 REAGENT STRIP/BLOOD GLUCOSE: CPT

## 2025-08-27 PROCEDURE — 25010000002 ENOXAPARIN PER 10 MG

## 2025-08-27 PROCEDURE — 80048 BASIC METABOLIC PNL TOTAL CA: CPT

## 2025-08-27 PROCEDURE — 82948 REAGENT STRIP/BLOOD GLUCOSE: CPT | Performed by: INTERNAL MEDICINE

## 2025-08-27 PROCEDURE — 85027 COMPLETE CBC AUTOMATED: CPT

## 2025-08-27 PROCEDURE — 63710000001 INSULIN LISPRO (HUMAN) PER 5 UNITS: Performed by: INTERNAL MEDICINE

## 2025-08-27 PROCEDURE — 83735 ASSAY OF MAGNESIUM: CPT

## 2025-08-27 RX ORDER — MAGNESIUM SULFATE HEPTAHYDRATE 40 MG/ML
4 INJECTION, SOLUTION INTRAVENOUS ONCE
Status: COMPLETED | OUTPATIENT
Start: 2025-08-27 | End: 2025-08-27

## 2025-08-27 RX ADMIN — ENOXAPARIN SODIUM 105 MG: 150 INJECTION SUBCUTANEOUS at 05:32

## 2025-08-27 RX ADMIN — INSULIN LISPRO 2 UNITS: 100 INJECTION, SOLUTION INTRAVENOUS; SUBCUTANEOUS at 12:14

## 2025-08-27 RX ADMIN — MAGNESIUM SULFATE IN WATER FOR 4 G: 40 INJECTION INTRAVENOUS at 13:35

## 2025-08-27 RX ADMIN — INSULIN LISPRO 2 UNITS: 100 INJECTION, SOLUTION INTRAVENOUS; SUBCUTANEOUS at 17:05

## 2025-08-27 RX ADMIN — INSULIN LISPRO 2 UNITS: 100 INJECTION, SOLUTION INTRAVENOUS; SUBCUTANEOUS at 09:10

## 2025-08-27 RX ADMIN — INSULIN LISPRO 5 UNITS: 100 INJECTION, SOLUTION INTRAVENOUS; SUBCUTANEOUS at 21:23

## 2025-08-27 RX ADMIN — ATORVASTATIN CALCIUM 40 MG: 40 TABLET, FILM COATED ORAL at 21:23

## 2025-08-27 RX ADMIN — METOPROLOL SUCCINATE 25 MG: 25 TABLET, EXTENDED RELEASE ORAL at 09:10

## 2025-08-27 RX ADMIN — Medication 10 ML: at 09:11

## 2025-08-27 RX ADMIN — ASPIRIN 81 MG: 81 TABLET, DELAYED RELEASE ORAL at 09:09

## 2025-08-27 RX ADMIN — FUROSEMIDE 40 MG: 40 TABLET ORAL at 09:09

## 2025-08-27 RX ADMIN — Medication 10 ML: at 21:23

## 2025-08-27 RX ADMIN — ENOXAPARIN SODIUM 105 MG: 150 INJECTION SUBCUTANEOUS at 17:05

## 2025-08-28 LAB
AMPHET+METHAMPHET UR QL: NEGATIVE
AMPHETAMINES UR QL: NEGATIVE
ANION GAP SERPL CALCULATED.3IONS-SCNC: 11.2 MMOL/L (ref 5–15)
BACTERIA UR QL AUTO: NORMAL /HPF
BARBITURATES UR QL SCN: NEGATIVE
BENZODIAZ UR QL SCN: NEGATIVE
BILIRUB UR QL STRIP: NEGATIVE
BUN SERPL-MCNC: 22.1 MG/DL (ref 8–23)
BUN/CREAT SERPL: 29.5 (ref 7–25)
BUPRENORPHINE SERPL-MCNC: NEGATIVE NG/ML
CALCIUM SPEC-SCNC: 9.1 MG/DL (ref 8.6–10.5)
CANNABINOIDS SERPL QL: NEGATIVE
CHLORIDE SERPL-SCNC: 102 MMOL/L (ref 98–107)
CLARITY UR: CLEAR
CO2 SERPL-SCNC: 24.8 MMOL/L (ref 22–29)
COCAINE UR QL: NEGATIVE
COLOR UR: YELLOW
CREAT SERPL-MCNC: 0.75 MG/DL (ref 0.76–1.27)
DEPRECATED RDW RBC AUTO: 44 FL (ref 37–54)
EGFRCR SERPLBLD CKD-EPI 2021: 95.9 ML/MIN/1.73
ERYTHROCYTE [DISTWIDTH] IN BLOOD BY AUTOMATED COUNT: 12.6 % (ref 12.3–15.4)
FENTANYL UR-MCNC: NEGATIVE NG/ML
GLUCOSE BLDC GLUCOMTR-MCNC: 163 MG/DL (ref 70–130)
GLUCOSE BLDC GLUCOMTR-MCNC: 167 MG/DL (ref 70–130)
GLUCOSE BLDC GLUCOMTR-MCNC: 188 MG/DL (ref 70–130)
GLUCOSE BLDC GLUCOMTR-MCNC: 246 MG/DL (ref 70–130)
GLUCOSE BLDC GLUCOMTR-MCNC: 289 MG/DL (ref 70–130)
GLUCOSE SERPL-MCNC: 150 MG/DL (ref 65–99)
GLUCOSE UR STRIP-MCNC: ABNORMAL MG/DL
HCT VFR BLD AUTO: 48.5 % (ref 37.5–51)
HGB BLD-MCNC: 16.4 G/DL (ref 13–17.7)
HGB UR QL STRIP.AUTO: ABNORMAL
HYALINE CASTS UR QL AUTO: NORMAL /LPF
KETONES UR QL STRIP: NEGATIVE
LEUKOCYTE ESTERASE UR QL STRIP.AUTO: NEGATIVE
MAGNESIUM SERPL-MCNC: 2.2 MG/DL (ref 1.6–2.4)
MCH RBC QN AUTO: 32 PG (ref 26.6–33)
MCHC RBC AUTO-ENTMCNC: 33.8 G/DL (ref 31.5–35.7)
MCV RBC AUTO: 94.5 FL (ref 79–97)
METHADONE UR QL SCN: NEGATIVE
NITRITE UR QL STRIP: NEGATIVE
OPIATES UR QL: NEGATIVE
OXYCODONE UR QL SCN: NEGATIVE
PA ADP PRP-ACNC: 151 PRU
PCP UR QL SCN: NEGATIVE
PH UR STRIP.AUTO: <=5 [PH] (ref 5–8)
PLATELET # BLD AUTO: 148 10*3/MM3 (ref 140–450)
PMV BLD AUTO: 10.1 FL (ref 6–12)
POTASSIUM SERPL-SCNC: 4 MMOL/L (ref 3.5–5.2)
PROT UR QL STRIP: ABNORMAL
QT INTERVAL: 384 MS
QTC INTERVAL: 456 MS
RBC # BLD AUTO: 5.13 10*6/MM3 (ref 4.14–5.8)
RBC # UR STRIP: NORMAL /HPF
REF LAB TEST METHOD: NORMAL
SODIUM SERPL-SCNC: 138 MMOL/L (ref 136–145)
SP GR UR STRIP: 1.02 (ref 1–1.03)
SQUAMOUS #/AREA URNS HPF: NORMAL /HPF
TRICYCLICS UR QL SCN: NEGATIVE
UROBILINOGEN UR QL STRIP: ABNORMAL
WBC # UR STRIP: NORMAL /HPF
WBC NRBC COR # BLD AUTO: 8.83 10*3/MM3 (ref 3.4–10.8)

## 2025-08-28 PROCEDURE — 99232 SBSQ HOSP IP/OBS MODERATE 35: CPT | Performed by: NURSE PRACTITIONER

## 2025-08-28 PROCEDURE — 93005 ELECTROCARDIOGRAM TRACING: CPT | Performed by: THORACIC SURGERY (CARDIOTHORACIC VASCULAR SURGERY)

## 2025-08-28 PROCEDURE — 85576 BLOOD PLATELET AGGREGATION: CPT

## 2025-08-28 PROCEDURE — 25010000002 ENOXAPARIN PER 10 MG

## 2025-08-28 PROCEDURE — 82948 REAGENT STRIP/BLOOD GLUCOSE: CPT | Performed by: INTERNAL MEDICINE

## 2025-08-28 PROCEDURE — 83735 ASSAY OF MAGNESIUM: CPT

## 2025-08-28 PROCEDURE — 82948 REAGENT STRIP/BLOOD GLUCOSE: CPT

## 2025-08-28 PROCEDURE — 85027 COMPLETE CBC AUTOMATED: CPT

## 2025-08-28 PROCEDURE — 81001 URINALYSIS AUTO W/SCOPE: CPT

## 2025-08-28 PROCEDURE — 80048 BASIC METABOLIC PNL TOTAL CA: CPT

## 2025-08-28 PROCEDURE — 63710000001 INSULIN LISPRO (HUMAN) PER 5 UNITS: Performed by: INTERNAL MEDICINE

## 2025-08-28 PROCEDURE — 80307 DRUG TEST PRSMV CHEM ANLYZR: CPT

## 2025-08-28 PROCEDURE — 99232 SBSQ HOSP IP/OBS MODERATE 35: CPT

## 2025-08-28 RX ORDER — ENOXAPARIN SODIUM 100 MG/ML
105 INJECTION SUBCUTANEOUS EVERY 12 HOURS
Status: DISCONTINUED | OUTPATIENT
Start: 2025-08-28 | End: 2025-08-29 | Stop reason: HOSPADM

## 2025-08-28 RX ORDER — CHLORHEXIDINE GLUCONATE 500 MG/1
CLOTH TOPICAL EVERY 12 HOURS PRN
Status: DISCONTINUED | OUTPATIENT
Start: 2025-08-28 | End: 2025-08-28

## 2025-08-28 RX ORDER — CHLORHEXIDINE GLUCONATE ORAL RINSE 1.2 MG/ML
15 SOLUTION DENTAL EVERY 12 HOURS
Status: DISCONTINUED | OUTPATIENT
Start: 2025-08-28 | End: 2025-08-28

## 2025-08-28 RX ORDER — IPRATROPIUM BROMIDE AND ALBUTEROL SULFATE 2.5; .5 MG/3ML; MG/3ML
3 SOLUTION RESPIRATORY (INHALATION) EVERY 4 HOURS PRN
Status: DISCONTINUED | OUTPATIENT
Start: 2025-08-28 | End: 2025-08-28

## 2025-08-28 RX ORDER — GABAPENTIN 300 MG/1
300 CAPSULE ORAL ONCE
OUTPATIENT
Start: 2025-08-29

## 2025-08-28 RX ADMIN — INSULIN LISPRO 2 UNITS: 100 INJECTION, SOLUTION INTRAVENOUS; SUBCUTANEOUS at 12:25

## 2025-08-28 RX ADMIN — Medication 10 ML: at 20:38

## 2025-08-28 RX ADMIN — METOPROLOL SUCCINATE 25 MG: 25 TABLET, EXTENDED RELEASE ORAL at 08:21

## 2025-08-28 RX ADMIN — Medication 10 ML: at 08:21

## 2025-08-28 RX ADMIN — FUROSEMIDE 40 MG: 40 TABLET ORAL at 08:21

## 2025-08-28 RX ADMIN — ENOXAPARIN SODIUM 110 MG: 60 INJECTION SUBCUTANEOUS at 18:21

## 2025-08-28 RX ADMIN — INSULIN LISPRO 3 UNITS: 100 INJECTION, SOLUTION INTRAVENOUS; SUBCUTANEOUS at 22:34

## 2025-08-28 RX ADMIN — ATORVASTATIN CALCIUM 40 MG: 40 TABLET, FILM COATED ORAL at 20:41

## 2025-08-28 RX ADMIN — INSULIN GLARGINE-YFGN 5 UNITS: 100 INJECTION, SOLUTION SUBCUTANEOUS at 12:25

## 2025-08-28 RX ADMIN — INSULIN LISPRO 2 UNITS: 100 INJECTION, SOLUTION INTRAVENOUS; SUBCUTANEOUS at 08:21

## 2025-08-28 RX ADMIN — INSULIN LISPRO 2 UNITS: 100 INJECTION, SOLUTION INTRAVENOUS; SUBCUTANEOUS at 18:22

## 2025-08-28 RX ADMIN — ENOXAPARIN SODIUM 105 MG: 150 INJECTION SUBCUTANEOUS at 05:25

## 2025-08-28 RX ADMIN — ASPIRIN 81 MG: 81 TABLET, DELAYED RELEASE ORAL at 08:21

## 2025-08-29 VITALS
OXYGEN SATURATION: 94 % | DIASTOLIC BLOOD PRESSURE: 78 MMHG | WEIGHT: 232 LBS | HEART RATE: 75 BPM | SYSTOLIC BLOOD PRESSURE: 123 MMHG | TEMPERATURE: 98.8 F | RESPIRATION RATE: 18 BRPM | BODY MASS INDEX: 29.77 KG/M2 | HEIGHT: 74 IN

## 2025-08-29 LAB
ANION GAP SERPL CALCULATED.3IONS-SCNC: 9 MMOL/L (ref 5–15)
BASOPHILS # BLD AUTO: 0.05 10*3/MM3 (ref 0–0.2)
BASOPHILS NFR BLD AUTO: 0.7 % (ref 0–1.5)
BUN SERPL-MCNC: 22.5 MG/DL (ref 8–23)
BUN/CREAT SERPL: 26.8 (ref 7–25)
CALCIUM SPEC-SCNC: 9.5 MG/DL (ref 8.6–10.5)
CHLORIDE SERPL-SCNC: 98 MMOL/L (ref 98–107)
CO2 SERPL-SCNC: 30 MMOL/L (ref 22–29)
CREAT SERPL-MCNC: 0.84 MG/DL (ref 0.76–1.27)
DEPRECATED RDW RBC AUTO: 44.6 FL (ref 37–54)
EGFRCR SERPLBLD CKD-EPI 2021: 92.7 ML/MIN/1.73
EOSINOPHIL # BLD AUTO: 0.12 10*3/MM3 (ref 0–0.4)
EOSINOPHIL NFR BLD AUTO: 1.6 % (ref 0.3–6.2)
ERYTHROCYTE [DISTWIDTH] IN BLOOD BY AUTOMATED COUNT: 12.7 % (ref 12.3–15.4)
GLUCOSE BLDC GLUCOMTR-MCNC: 145 MG/DL (ref 70–130)
GLUCOSE BLDC GLUCOMTR-MCNC: 173 MG/DL (ref 70–130)
GLUCOSE SERPL-MCNC: 148 MG/DL (ref 65–99)
HCT VFR BLD AUTO: 51.9 % (ref 37.5–51)
HGB BLD-MCNC: 17.4 G/DL (ref 13–17.7)
IMM GRANULOCYTES # BLD AUTO: 0.01 10*3/MM3 (ref 0–0.05)
IMM GRANULOCYTES NFR BLD AUTO: 0.1 % (ref 0–0.5)
LYMPHOCYTES # BLD AUTO: 1.45 10*3/MM3 (ref 0.7–3.1)
LYMPHOCYTES NFR BLD AUTO: 19 % (ref 19.6–45.3)
MAGNESIUM SERPL-MCNC: 2.2 MG/DL (ref 1.6–2.4)
MCH RBC QN AUTO: 32 PG (ref 26.6–33)
MCHC RBC AUTO-ENTMCNC: 33.5 G/DL (ref 31.5–35.7)
MCV RBC AUTO: 95.4 FL (ref 79–97)
MONOCYTES # BLD AUTO: 0.86 10*3/MM3 (ref 0.1–0.9)
MONOCYTES NFR BLD AUTO: 11.3 % (ref 5–12)
NEUTROPHILS NFR BLD AUTO: 5.15 10*3/MM3 (ref 1.7–7)
NEUTROPHILS NFR BLD AUTO: 67.3 % (ref 42.7–76)
NRBC BLD AUTO-RTO: 0 /100 WBC (ref 0–0.2)
PA ADP PRP-ACNC: 155 PRU
PLATELET # BLD AUTO: 160 10*3/MM3 (ref 140–450)
PMV BLD AUTO: 10.7 FL (ref 6–12)
POTASSIUM SERPL-SCNC: 4.8 MMOL/L (ref 3.5–5.2)
RBC # BLD AUTO: 5.44 10*6/MM3 (ref 4.14–5.8)
SODIUM SERPL-SCNC: 137 MMOL/L (ref 136–145)
WBC NRBC COR # BLD AUTO: 7.64 10*3/MM3 (ref 3.4–10.8)

## 2025-08-29 PROCEDURE — 80048 BASIC METABOLIC PNL TOTAL CA: CPT

## 2025-08-29 PROCEDURE — 63710000001 INSULIN LISPRO (HUMAN) PER 5 UNITS: Performed by: INTERNAL MEDICINE

## 2025-08-29 PROCEDURE — 85025 COMPLETE CBC W/AUTO DIFF WBC: CPT

## 2025-08-29 PROCEDURE — 85576 BLOOD PLATELET AGGREGATION: CPT

## 2025-08-29 PROCEDURE — 82948 REAGENT STRIP/BLOOD GLUCOSE: CPT

## 2025-08-29 PROCEDURE — 83735 ASSAY OF MAGNESIUM: CPT

## 2025-08-29 PROCEDURE — 25010000002 ENOXAPARIN PER 10 MG

## 2025-08-29 RX ORDER — INSULIN LISPRO 100 [IU]/ML
2-7 INJECTION, SOLUTION INTRAVENOUS; SUBCUTANEOUS
Start: 2025-08-29

## 2025-08-29 RX ORDER — INSULIN LISPRO 100 [IU]/ML
2-7 INJECTION, SOLUTION INTRAVENOUS; SUBCUTANEOUS
Status: CANCELLED
Start: 2025-08-29

## 2025-08-29 RX ORDER — ENOXAPARIN SODIUM 100 MG/ML
105 INJECTION SUBCUTANEOUS EVERY 12 HOURS
Status: CANCELLED
Start: 2025-08-29

## 2025-08-29 RX ORDER — METOPROLOL SUCCINATE 50 MG/1
50 TABLET, EXTENDED RELEASE ORAL DAILY
Status: DISCONTINUED | OUTPATIENT
Start: 2025-08-30 | End: 2025-08-29 | Stop reason: HOSPADM

## 2025-08-29 RX ORDER — ENOXAPARIN SODIUM 100 MG/ML
105 INJECTION SUBCUTANEOUS EVERY 12 HOURS
Start: 2025-08-29

## 2025-08-29 RX ORDER — METOPROLOL SUCCINATE 25 MG/1
25 TABLET, EXTENDED RELEASE ORAL ONCE
Status: COMPLETED | OUTPATIENT
Start: 2025-08-29 | End: 2025-08-29

## 2025-08-29 RX ADMIN — FUROSEMIDE 40 MG: 40 TABLET ORAL at 08:43

## 2025-08-29 RX ADMIN — ENOXAPARIN SODIUM 110 MG: 60 INJECTION SUBCUTANEOUS at 06:40

## 2025-08-29 RX ADMIN — Medication 10 ML: at 08:46

## 2025-08-29 RX ADMIN — METOPROLOL SUCCINATE 25 MG: 25 TABLET, EXTENDED RELEASE ORAL at 08:43

## 2025-08-29 RX ADMIN — ASPIRIN 81 MG: 81 TABLET, DELAYED RELEASE ORAL at 08:43

## 2025-08-29 RX ADMIN — METOPROLOL SUCCINATE 25 MG: 25 TABLET, EXTENDED RELEASE ORAL at 14:33

## 2025-08-29 RX ADMIN — INSULIN GLARGINE-YFGN 8 UNITS: 100 INJECTION, SOLUTION SUBCUTANEOUS at 08:43

## 2025-08-29 RX ADMIN — INSULIN LISPRO 2 UNITS: 100 INJECTION, SOLUTION INTRAVENOUS; SUBCUTANEOUS at 08:43

## (undated) DEVICE — ADULT, W/LG. BACK PAD, RADIOTRANSPARENT ELEMENT AND LEAD WIRE COMPATIBLE W/: Brand: DEFIBRILLATION ELECTRODES

## (undated) DEVICE — GW PERIPH VASC ADX J/TP SS .035 150CM 3MM

## (undated) DEVICE — RADIFOCUS OPTITORQUE ANGIOGRAPHIC CATHETER: Brand: OPTITORQUE

## (undated) DEVICE — CATH F5 INF PIG 110CM 6SH: Brand: INFINITI

## (undated) DEVICE — PK CATH CARD 10

## (undated) DEVICE — CVR PROB ULTRASND/TRANSD W/GEL 7X11IN STRL

## (undated) DEVICE — GLIDESHEATH SLENDER ACCESS KIT: Brand: GLIDESHEATH SLENDER

## (undated) DEVICE — LHK- LEFT HEART KIT BAPTIST: Brand: MEDLINE INDUSTRIES, INC.

## (undated) DEVICE — TR BAND RADIAL ARTERY COMPRESSION DEVICE: Brand: TR BAND

## (undated) DEVICE — GW PERIPH GUIDERIGHT STD/EXCHNG/J/TIP SS 0.035IN 5X260CM